# Patient Record
Sex: FEMALE | Race: WHITE | NOT HISPANIC OR LATINO | Employment: OTHER | URBAN - METROPOLITAN AREA
[De-identification: names, ages, dates, MRNs, and addresses within clinical notes are randomized per-mention and may not be internally consistent; named-entity substitution may affect disease eponyms.]

---

## 2024-02-25 ENCOUNTER — APPOINTMENT (OUTPATIENT)
Dept: RADIOLOGY | Facility: MEDICAL CENTER | Age: 89
End: 2024-02-25
Attending: EMERGENCY MEDICINE
Payer: MEDICARE

## 2024-02-25 ENCOUNTER — HOSPITAL ENCOUNTER (OUTPATIENT)
Facility: MEDICAL CENTER | Age: 89
End: 2024-02-27
Attending: EMERGENCY MEDICINE | Admitting: HOSPITALIST
Payer: MEDICARE

## 2024-02-25 DIAGNOSIS — E78.5 DYSLIPIDEMIA: ICD-10-CM

## 2024-02-25 DIAGNOSIS — D75.839 THROMBOCYTOSIS: ICD-10-CM

## 2024-02-25 DIAGNOSIS — E87.1 HYPONATREMIA: ICD-10-CM

## 2024-02-25 DIAGNOSIS — Z66 DNR (DO NOT RESUSCITATE): ICD-10-CM

## 2024-02-25 DIAGNOSIS — S32.020A CLOSED COMPRESSION FRACTURE OF L2 LUMBAR VERTEBRA, INITIAL ENCOUNTER (HCC): ICD-10-CM

## 2024-02-25 DIAGNOSIS — I10 HYPERTENSION, UNSPECIFIED TYPE: ICD-10-CM

## 2024-02-25 DIAGNOSIS — D72.829 LEUKOCYTOSIS, UNSPECIFIED TYPE: ICD-10-CM

## 2024-02-25 DIAGNOSIS — R74.8 ELEVATED ALKALINE PHOSPHATASE LEVEL: ICD-10-CM

## 2024-02-25 DIAGNOSIS — E11.9 CONTROLLED TYPE 2 DIABETES MELLITUS WITHOUT COMPLICATION, WITHOUT LONG-TERM CURRENT USE OF INSULIN (HCC): ICD-10-CM

## 2024-02-25 LAB
ALBUMIN SERPL BCP-MCNC: 4.1 G/DL (ref 3.2–4.9)
ALBUMIN/GLOB SERPL: 1.6 G/DL
ALP SERPL-CCNC: 143 U/L (ref 30–99)
ALT SERPL-CCNC: 8 U/L (ref 2–50)
ANION GAP SERPL CALC-SCNC: 16 MMOL/L (ref 7–16)
AST SERPL-CCNC: 13 U/L (ref 12–45)
BASOPHILS # BLD AUTO: 0.4 % (ref 0–1.8)
BASOPHILS # BLD: 0.06 K/UL (ref 0–0.12)
BILIRUB SERPL-MCNC: 0.7 MG/DL (ref 0.1–1.5)
BUN SERPL-MCNC: 19 MG/DL (ref 8–22)
CALCIUM ALBUM COR SERPL-MCNC: 8.6 MG/DL (ref 8.5–10.5)
CALCIUM SERPL-MCNC: 8.7 MG/DL (ref 8.4–10.2)
CHLORIDE SERPL-SCNC: 96 MMOL/L (ref 96–112)
CO2 SERPL-SCNC: 21 MMOL/L (ref 20–33)
CREAT SERPL-MCNC: 0.37 MG/DL (ref 0.5–1.4)
EOSINOPHIL # BLD AUTO: 0.07 K/UL (ref 0–0.51)
EOSINOPHIL NFR BLD: 0.4 % (ref 0–6.9)
ERYTHROCYTE [DISTWIDTH] IN BLOOD BY AUTOMATED COUNT: 43.5 FL (ref 35.9–50)
GFR SERPLBLD CREATININE-BSD FMLA CKD-EPI: 96 ML/MIN/1.73 M 2
GLOBULIN SER CALC-MCNC: 2.6 G/DL (ref 1.9–3.5)
GLUCOSE BLD STRIP.AUTO-MCNC: 104 MG/DL (ref 65–99)
GLUCOSE SERPL-MCNC: 108 MG/DL (ref 65–99)
HCT VFR BLD AUTO: 41.3 % (ref 37–47)
HGB BLD-MCNC: 14.3 G/DL (ref 12–16)
IMM GRANULOCYTES # BLD AUTO: 0.11 K/UL (ref 0–0.11)
IMM GRANULOCYTES NFR BLD AUTO: 0.7 % (ref 0–0.9)
INR PPP: 1.05 (ref 0.87–1.13)
LACTATE SERPL-SCNC: 1.2 MMOL/L (ref 0.5–2)
LYMPHOCYTES # BLD AUTO: 2.27 K/UL (ref 1–4.8)
LYMPHOCYTES NFR BLD: 13.7 % (ref 22–41)
MCH RBC QN AUTO: 32.3 PG (ref 27–33)
MCHC RBC AUTO-ENTMCNC: 34.6 G/DL (ref 32.2–35.5)
MCV RBC AUTO: 93.2 FL (ref 81.4–97.8)
MONOCYTES # BLD AUTO: 1.72 K/UL (ref 0–0.85)
MONOCYTES NFR BLD AUTO: 10.4 % (ref 0–13.4)
NEUTROPHILS # BLD AUTO: 12.34 K/UL (ref 1.82–7.42)
NEUTROPHILS NFR BLD: 74.4 % (ref 44–72)
NRBC # BLD AUTO: 0 K/UL
NRBC BLD-RTO: 0 /100 WBC (ref 0–0.2)
PLATELET # BLD AUTO: 481 K/UL (ref 164–446)
PMV BLD AUTO: 8 FL (ref 9–12.9)
POTASSIUM SERPL-SCNC: 3.9 MMOL/L (ref 3.6–5.5)
PROCALCITONIN SERPL-MCNC: 0.04 NG/ML
PROT SERPL-MCNC: 6.7 G/DL (ref 6–8.2)
PROTHROMBIN TIME: 14.1 SEC (ref 12–14.6)
RBC # BLD AUTO: 4.43 M/UL (ref 4.2–5.4)
SODIUM SERPL-SCNC: 133 MMOL/L (ref 135–145)
WBC # BLD AUTO: 16.6 K/UL (ref 4.8–10.8)

## 2024-02-25 PROCEDURE — 83605 ASSAY OF LACTIC ACID: CPT

## 2024-02-25 PROCEDURE — G0378 HOSPITAL OBSERVATION PER HR: HCPCS

## 2024-02-25 PROCEDURE — 96376 TX/PRO/DX INJ SAME DRUG ADON: CPT

## 2024-02-25 PROCEDURE — 82962 GLUCOSE BLOOD TEST: CPT

## 2024-02-25 PROCEDURE — 700111 HCHG RX REV CODE 636 W/ 250 OVERRIDE (IP): Mod: JZ | Performed by: HOSPITALIST

## 2024-02-25 PROCEDURE — 72192 CT PELVIS W/O DYE: CPT

## 2024-02-25 PROCEDURE — 99223 1ST HOSP IP/OBS HIGH 75: CPT | Performed by: HOSPITALIST

## 2024-02-25 PROCEDURE — 96375 TX/PRO/DX INJ NEW DRUG ADDON: CPT

## 2024-02-25 PROCEDURE — 700105 HCHG RX REV CODE 258: Performed by: EMERGENCY MEDICINE

## 2024-02-25 PROCEDURE — 84145 PROCALCITONIN (PCT): CPT

## 2024-02-25 PROCEDURE — 85610 PROTHROMBIN TIME: CPT

## 2024-02-25 PROCEDURE — 36415 COLL VENOUS BLD VENIPUNCTURE: CPT

## 2024-02-25 PROCEDURE — 80053 COMPREHEN METABOLIC PANEL: CPT

## 2024-02-25 PROCEDURE — 85025 COMPLETE CBC W/AUTO DIFF WBC: CPT

## 2024-02-25 PROCEDURE — 71045 X-RAY EXAM CHEST 1 VIEW: CPT

## 2024-02-25 PROCEDURE — 94760 N-INVAS EAR/PLS OXIMETRY 1: CPT

## 2024-02-25 PROCEDURE — 99285 EMERGENCY DEPT VISIT HI MDM: CPT

## 2024-02-25 PROCEDURE — 700111 HCHG RX REV CODE 636 W/ 250 OVERRIDE (IP): Mod: JZ | Performed by: EMERGENCY MEDICINE

## 2024-02-25 PROCEDURE — 700102 HCHG RX REV CODE 250 W/ 637 OVERRIDE(OP): Performed by: HOSPITALIST

## 2024-02-25 PROCEDURE — A9270 NON-COVERED ITEM OR SERVICE: HCPCS | Performed by: HOSPITALIST

## 2024-02-25 PROCEDURE — 700105 HCHG RX REV CODE 258: Performed by: HOSPITALIST

## 2024-02-25 PROCEDURE — 96365 THER/PROPH/DIAG IV INF INIT: CPT

## 2024-02-25 PROCEDURE — 72131 CT LUMBAR SPINE W/O DYE: CPT

## 2024-02-25 PROCEDURE — 82977 ASSAY OF GGT: CPT

## 2024-02-25 RX ORDER — ROSUVASTATIN CALCIUM 10 MG/1
10 TABLET, COATED ORAL DAILY
Status: ON HOLD | COMMUNITY
End: 2024-03-09

## 2024-02-25 RX ORDER — ASPIRIN 81 MG/1
81 TABLET ORAL DAILY
Status: ON HOLD | COMMUNITY
End: 2024-03-09

## 2024-02-25 RX ORDER — ASPIRIN 81 MG/1
81 TABLET ORAL DAILY
Status: DISCONTINUED | OUTPATIENT
Start: 2024-02-26 | End: 2024-02-26

## 2024-02-25 RX ORDER — ONDANSETRON 2 MG/ML
4 INJECTION INTRAMUSCULAR; INTRAVENOUS ONCE
Status: COMPLETED | OUTPATIENT
Start: 2024-02-25 | End: 2024-02-25

## 2024-02-25 RX ORDER — LABETALOL HYDROCHLORIDE 5 MG/ML
10 INJECTION, SOLUTION INTRAVENOUS EVERY 4 HOURS PRN
Status: DISCONTINUED | OUTPATIENT
Start: 2024-02-25 | End: 2024-02-27

## 2024-02-25 RX ORDER — OXYCODONE HYDROCHLORIDE 5 MG/1
2.5 TABLET ORAL
Status: DISCONTINUED | OUTPATIENT
Start: 2024-02-25 | End: 2024-02-27

## 2024-02-25 RX ORDER — MORPHINE SULFATE 4 MG/ML
2 INJECTION INTRAVENOUS
Status: DISCONTINUED | OUTPATIENT
Start: 2024-02-25 | End: 2024-02-26

## 2024-02-25 RX ORDER — AMLODIPINE BESYLATE 5 MG/1
5 TABLET ORAL DAILY
Status: DISCONTINUED | OUTPATIENT
Start: 2024-02-26 | End: 2024-02-27

## 2024-02-25 RX ORDER — VITAMIN B COMPLEX
1000 TABLET ORAL DAILY
Status: DISCONTINUED | OUTPATIENT
Start: 2024-02-26 | End: 2024-02-27

## 2024-02-25 RX ORDER — SODIUM CHLORIDE 9 MG/ML
1000 INJECTION, SOLUTION INTRAVENOUS ONCE
Status: COMPLETED | OUTPATIENT
Start: 2024-02-25 | End: 2024-02-25

## 2024-02-25 RX ORDER — METHOCARBAMOL 500 MG/1
500 TABLET, FILM COATED ORAL 4 TIMES DAILY
Status: DISCONTINUED | OUTPATIENT
Start: 2024-02-25 | End: 2024-02-27

## 2024-02-25 RX ORDER — OXYCODONE HYDROCHLORIDE 5 MG/1
5 TABLET ORAL
Status: DISCONTINUED | OUTPATIENT
Start: 2024-02-25 | End: 2024-02-27

## 2024-02-25 RX ORDER — RISEDRONATE SODIUM 35 MG/1
35 TABLET, FILM COATED ORAL
Status: ON HOLD | COMMUNITY
End: 2024-03-09

## 2024-02-25 RX ORDER — AMLODIPINE BESYLATE 5 MG/1
5 TABLET ORAL DAILY
Status: ON HOLD | COMMUNITY
End: 2024-03-09

## 2024-02-25 RX ORDER — BUTYROSPERMUM PARKII(SHEA BUTTER), SIMMONDSIA CHINENSIS (JOJOBA) SEED OIL, ALOE BARBADENSIS LEAF EXTRACT .01; 1; 3.5 G/100G; G/100G; G/100G
5000 LIQUID TOPICAL DAILY
COMMUNITY

## 2024-02-25 RX ORDER — MORPHINE SULFATE 4 MG/ML
4 INJECTION INTRAVENOUS ONCE
Status: COMPLETED | OUTPATIENT
Start: 2024-02-25 | End: 2024-02-25

## 2024-02-25 RX ORDER — ROSUVASTATIN CALCIUM 10 MG/1
10 TABLET, COATED ORAL DAILY
Status: DISCONTINUED | OUTPATIENT
Start: 2024-02-26 | End: 2024-02-27

## 2024-02-25 RX ORDER — GABAPENTIN 100 MG/1
100 CAPSULE ORAL 3 TIMES DAILY
Status: DISCONTINUED | OUTPATIENT
Start: 2024-02-25 | End: 2024-02-27

## 2024-02-25 RX ORDER — ONDANSETRON 4 MG/1
4 TABLET, ORALLY DISINTEGRATING ORAL EVERY 4 HOURS PRN
Status: DISCONTINUED | OUTPATIENT
Start: 2024-02-25 | End: 2024-02-27

## 2024-02-25 RX ORDER — ONDANSETRON 2 MG/ML
4 INJECTION INTRAMUSCULAR; INTRAVENOUS EVERY 4 HOURS PRN
Status: DISCONTINUED | OUTPATIENT
Start: 2024-02-25 | End: 2024-02-27

## 2024-02-25 RX ORDER — ACETAMINOPHEN 325 MG/1
650 TABLET ORAL EVERY 6 HOURS PRN
Status: DISCONTINUED | OUTPATIENT
Start: 2024-02-25 | End: 2024-02-27

## 2024-02-25 RX ORDER — DEXTROSE MONOHYDRATE 25 G/50ML
25 INJECTION, SOLUTION INTRAVENOUS
Status: DISCONTINUED | OUTPATIENT
Start: 2024-02-25 | End: 2024-02-27

## 2024-02-25 RX ORDER — MORPHINE SULFATE 4 MG/ML
2 INJECTION INTRAVENOUS ONCE
Status: COMPLETED | OUTPATIENT
Start: 2024-02-25 | End: 2024-02-25

## 2024-02-25 RX ORDER — IRBESARTAN 300 MG/1
300 TABLET ORAL DAILY
Status: ON HOLD | COMMUNITY
End: 2024-03-09

## 2024-02-25 RX ORDER — IRBESARTAN 150 MG/1
300 TABLET ORAL DAILY
Status: DISCONTINUED | OUTPATIENT
Start: 2024-02-26 | End: 2024-02-27

## 2024-02-25 RX ADMIN — SODIUM CHLORIDE 1000 ML: 9 INJECTION, SOLUTION INTRAVENOUS at 16:38

## 2024-02-25 RX ADMIN — OXYCODONE HYDROCHLORIDE 5 MG: 5 TABLET ORAL at 20:52

## 2024-02-25 RX ADMIN — MORPHINE SULFATE 2 MG: 4 INJECTION, SOLUTION INTRAMUSCULAR; INTRAVENOUS at 18:11

## 2024-02-25 RX ADMIN — ONDANSETRON 4 MG: 2 INJECTION INTRAMUSCULAR; INTRAVENOUS at 16:38

## 2024-02-25 RX ADMIN — METHOCARBAMOL 500 MG: 500 TABLET ORAL at 21:32

## 2024-02-25 RX ADMIN — CEFTRIAXONE SODIUM 1000 MG: 1 INJECTION, POWDER, FOR SOLUTION INTRAMUSCULAR; INTRAVENOUS at 18:45

## 2024-02-25 RX ADMIN — MORPHINE SULFATE 4 MG: 4 INJECTION, SOLUTION INTRAMUSCULAR; INTRAVENOUS at 16:38

## 2024-02-25 ASSESSMENT — ENCOUNTER SYMPTOMS
FEVER: 0
SHORTNESS OF BREATH: 0
COUGH: 0
FOCAL WEAKNESS: 0
SINUS PAIN: 0
POLYDIPSIA: 0
STRIDOR: 0
CHILLS: 0
SEIZURES: 0
INSOMNIA: 0
BLOOD IN STOOL: 0
EYES NEGATIVE: 1
BACK PAIN: 1
GASTROINTESTINAL NEGATIVE: 1
NEUROLOGICAL NEGATIVE: 1
SPUTUM PRODUCTION: 0
CARDIOVASCULAR NEGATIVE: 1
EYE DISCHARGE: 0
WHEEZING: 0
PHOTOPHOBIA: 0
DIAPHORESIS: 0
DEPRESSION: 0
EYE REDNESS: 0
HEARTBURN: 0
PSYCHIATRIC NEGATIVE: 1
PND: 0
FALLS: 1
ABDOMINAL PAIN: 0
RESPIRATORY NEGATIVE: 1
TINGLING: 0
BRUISES/BLEEDS EASILY: 0
DOUBLE VISION: 0
SENSORY CHANGE: 0
ORTHOPNEA: 0
DIZZINESS: 0
FLANK PAIN: 0
MYALGIAS: 0

## 2024-02-25 ASSESSMENT — COGNITIVE AND FUNCTIONAL STATUS - GENERAL
SUGGESTED CMS G CODE MODIFIER MOBILITY: CN
CLIMB 3 TO 5 STEPS WITH RAILING: TOTAL
DRESSING REGULAR UPPER BODY CLOTHING: TOTAL
WALKING IN HOSPITAL ROOM: TOTAL
SUGGESTED CMS G CODE MODIFIER DAILY ACTIVITY: CL
TOILETING: TOTAL
DRESSING REGULAR LOWER BODY CLOTHING: TOTAL
EATING MEALS: A LITTLE
PERSONAL GROOMING: A LOT
TURNING FROM BACK TO SIDE WHILE IN FLAT BAD: UNABLE
HELP NEEDED FOR BATHING: TOTAL
STANDING UP FROM CHAIR USING ARMS: TOTAL
DAILY ACTIVITIY SCORE: 9
MOVING FROM LYING ON BACK TO SITTING ON SIDE OF FLAT BED: UNABLE
MOVING TO AND FROM BED TO CHAIR: UNABLE
MOBILITY SCORE: 6

## 2024-02-25 ASSESSMENT — PATIENT HEALTH QUESTIONNAIRE - PHQ9
1. LITTLE INTEREST OR PLEASURE IN DOING THINGS: NOT AT ALL
SUM OF ALL RESPONSES TO PHQ9 QUESTIONS 1 AND 2: 0
2. FEELING DOWN, DEPRESSED, IRRITABLE, OR HOPELESS: NOT AT ALL

## 2024-02-25 ASSESSMENT — LIFESTYLE VARIABLES
HOW MANY TIMES IN THE PAST YEAR HAVE YOU HAD 5 OR MORE DRINKS IN A DAY: 0
ON A TYPICAL DAY WHEN YOU DRINK ALCOHOL HOW MANY DRINKS DO YOU HAVE: 0
EVER HAD A DRINK FIRST THING IN THE MORNING TO STEADY YOUR NERVES TO GET RID OF A HANGOVER: NO
EVER FELT BAD OR GUILTY ABOUT YOUR DRINKING: NO
HAVE PEOPLE ANNOYED YOU BY CRITICIZING YOUR DRINKING: NO
ALCOHOL_USE: NO
TOTAL SCORE: 0
SUBSTANCE_ABUSE: 0
AVERAGE NUMBER OF DAYS PER WEEK YOU HAVE A DRINK CONTAINING ALCOHOL: 0
CONSUMPTION TOTAL: NEGATIVE
HAVE YOU EVER FELT YOU SHOULD CUT DOWN ON YOUR DRINKING: NO

## 2024-02-25 ASSESSMENT — PAIN DESCRIPTION - PAIN TYPE: TYPE: ACUTE PAIN

## 2024-02-25 ASSESSMENT — VISUAL ACUITY: OU: 1

## 2024-02-25 ASSESSMENT — FIBROSIS 4 INDEX: FIB4 SCORE: 0.85

## 2024-02-26 ENCOUNTER — HOSPITAL ENCOUNTER (INPATIENT)
Facility: REHABILITATION | Age: 89
End: 2024-02-26
Attending: INTERNAL MEDICINE | Admitting: INTERNAL MEDICINE
Payer: MEDICARE

## 2024-02-26 LAB
ANION GAP SERPL CALC-SCNC: 14 MMOL/L (ref 7–16)
APPEARANCE UR: CLEAR
BACTERIA #/AREA URNS HPF: ABNORMAL /HPF
BILIRUB UR QL STRIP.AUTO: ABNORMAL
BUN SERPL-MCNC: 17 MG/DL (ref 8–22)
CALCIUM SERPL-MCNC: 8.1 MG/DL (ref 8.4–10.2)
CHLORIDE SERPL-SCNC: 101 MMOL/L (ref 96–112)
CO2 SERPL-SCNC: 21 MMOL/L (ref 20–33)
COLOR UR: YELLOW
CREAT SERPL-MCNC: 0.3 MG/DL (ref 0.5–1.4)
EPI CELLS #/AREA URNS HPF: ABNORMAL /HPF
ERYTHROCYTE [DISTWIDTH] IN BLOOD BY AUTOMATED COUNT: 43.8 FL (ref 35.9–50)
GFR SERPLBLD CREATININE-BSD FMLA CKD-EPI: 101 ML/MIN/1.73 M 2
GGT SERPL-CCNC: 19 U/L (ref 7–34)
GLUCOSE BLD STRIP.AUTO-MCNC: 164 MG/DL (ref 65–99)
GLUCOSE BLD STRIP.AUTO-MCNC: 218 MG/DL (ref 65–99)
GLUCOSE BLD STRIP.AUTO-MCNC: 239 MG/DL (ref 65–99)
GLUCOSE BLD STRIP.AUTO-MCNC: 84 MG/DL (ref 65–99)
GLUCOSE SERPL-MCNC: 87 MG/DL (ref 65–99)
GLUCOSE UR STRIP.AUTO-MCNC: NEGATIVE MG/DL
HCT VFR BLD AUTO: 37 % (ref 37–47)
HGB BLD-MCNC: 12.7 G/DL (ref 12–16)
KETONES UR STRIP.AUTO-MCNC: >=80 MG/DL
LEUKOCYTE ESTERASE UR QL STRIP.AUTO: ABNORMAL
MCH RBC QN AUTO: 32.2 PG (ref 27–33)
MCHC RBC AUTO-ENTMCNC: 34.3 G/DL (ref 32.2–35.5)
MCV RBC AUTO: 93.9 FL (ref 81.4–97.8)
MICRO URNS: ABNORMAL
MUCOUS THREADS #/AREA URNS HPF: ABNORMAL /HPF
NITRITE UR QL STRIP.AUTO: POSITIVE
PH UR STRIP.AUTO: 5.5 [PH] (ref 5–8)
PLATELET # BLD AUTO: 395 K/UL (ref 164–446)
PMV BLD AUTO: 7.7 FL (ref 9–12.9)
POTASSIUM SERPL-SCNC: 3.5 MMOL/L (ref 3.6–5.5)
PROT UR QL STRIP: NEGATIVE MG/DL
RBC # BLD AUTO: 3.94 M/UL (ref 4.2–5.4)
RBC # URNS HPF: ABNORMAL /HPF
RBC UR QL AUTO: NEGATIVE
SODIUM SERPL-SCNC: 136 MMOL/L (ref 135–145)
SP GR UR STRIP.AUTO: >=1.03
WBC # BLD AUTO: 15.1 K/UL (ref 4.8–10.8)
WBC #/AREA URNS HPF: ABNORMAL /HPF

## 2024-02-26 PROCEDURE — 99232 SBSQ HOSP IP/OBS MODERATE 35: CPT | Performed by: INTERNAL MEDICINE

## 2024-02-26 PROCEDURE — 94760 N-INVAS EAR/PLS OXIMETRY 1: CPT

## 2024-02-26 PROCEDURE — 700102 HCHG RX REV CODE 250 W/ 637 OVERRIDE(OP): Performed by: HOSPITALIST

## 2024-02-26 PROCEDURE — G0378 HOSPITAL OBSERVATION PER HR: HCPCS

## 2024-02-26 PROCEDURE — 85027 COMPLETE CBC AUTOMATED: CPT

## 2024-02-26 PROCEDURE — 82962 GLUCOSE BLOOD TEST: CPT | Mod: 91

## 2024-02-26 PROCEDURE — A9270 NON-COVERED ITEM OR SERVICE: HCPCS | Performed by: HOSPITALIST

## 2024-02-26 PROCEDURE — 97162 PT EVAL MOD COMPLEX 30 MIN: CPT

## 2024-02-26 PROCEDURE — 87040 BLOOD CULTURE FOR BACTERIA: CPT | Mod: 91

## 2024-02-26 PROCEDURE — 700101 HCHG RX REV CODE 250: Performed by: INTERNAL MEDICINE

## 2024-02-26 PROCEDURE — 700102 HCHG RX REV CODE 250 W/ 637 OVERRIDE(OP): Performed by: INTERNAL MEDICINE

## 2024-02-26 PROCEDURE — A9270 NON-COVERED ITEM OR SERVICE: HCPCS | Performed by: INTERNAL MEDICINE

## 2024-02-26 PROCEDURE — 81001 URINALYSIS AUTO W/SCOPE: CPT

## 2024-02-26 PROCEDURE — 97166 OT EVAL MOD COMPLEX 45 MIN: CPT

## 2024-02-26 PROCEDURE — 80048 BASIC METABOLIC PNL TOTAL CA: CPT

## 2024-02-26 PROCEDURE — 36415 COLL VENOUS BLD VENIPUNCTURE: CPT

## 2024-02-26 PROCEDURE — 96372 THER/PROPH/DIAG INJ SC/IM: CPT

## 2024-02-26 PROCEDURE — 97535 SELF CARE MNGMENT TRAINING: CPT

## 2024-02-26 RX ORDER — LIDOCAINE 50 MG/G
1 PATCH TOPICAL EVERY 24 HOURS
Status: DISCONTINUED | OUTPATIENT
Start: 2024-02-26 | End: 2024-02-27

## 2024-02-26 RX ORDER — POLYETHYLENE GLYCOL 3350 17 G/17G
1 POWDER, FOR SOLUTION ORAL 2 TIMES DAILY
Status: DISCONTINUED | OUTPATIENT
Start: 2024-02-26 | End: 2024-02-27

## 2024-02-26 RX ADMIN — INSULIN HUMAN 1 UNITS: 100 INJECTION, SOLUTION PARENTERAL at 20:55

## 2024-02-26 RX ADMIN — METHOCARBAMOL 500 MG: 500 TABLET ORAL at 08:04

## 2024-02-26 RX ADMIN — INSULIN HUMAN 2 UNITS: 100 INJECTION, SOLUTION PARENTERAL at 16:43

## 2024-02-26 RX ADMIN — LIDOCAINE 1 PATCH: 700 PATCH TOPICAL at 09:13

## 2024-02-26 RX ADMIN — Medication 1000 UNITS: at 06:41

## 2024-02-26 RX ADMIN — METFORMIN HYDROCHLORIDE 500 MG: 500 TABLET ORAL at 06:41

## 2024-02-26 RX ADMIN — ASPIRIN 81 MG: 81 TABLET, COATED ORAL at 06:41

## 2024-02-26 RX ADMIN — OXYCODONE HYDROCHLORIDE 5 MG: 5 TABLET ORAL at 23:51

## 2024-02-26 RX ADMIN — INSULIN HUMAN 2 UNITS: 100 INJECTION, SOLUTION PARENTERAL at 11:57

## 2024-02-26 RX ADMIN — AMLODIPINE BESYLATE 5 MG: 5 TABLET ORAL at 06:41

## 2024-02-26 RX ADMIN — GABAPENTIN 100 MG: 100 CAPSULE ORAL at 06:40

## 2024-02-26 RX ADMIN — ROSUVASTATIN 10 MG: 10 TABLET, FILM COATED ORAL at 06:41

## 2024-02-26 RX ADMIN — GABAPENTIN 100 MG: 100 CAPSULE ORAL at 12:01

## 2024-02-26 RX ADMIN — IRBESARTAN 300 MG: 150 TABLET ORAL at 06:42

## 2024-02-26 RX ADMIN — METHOCARBAMOL 500 MG: 500 TABLET ORAL at 16:45

## 2024-02-26 RX ADMIN — METHOCARBAMOL 500 MG: 500 TABLET ORAL at 20:48

## 2024-02-26 RX ADMIN — OXYCODONE HYDROCHLORIDE 5 MG: 5 TABLET ORAL at 12:00

## 2024-02-26 RX ADMIN — POLYETHYLENE GLYCOL 3350 1 PACKET: 17 POWDER, FOR SOLUTION ORAL at 10:00

## 2024-02-26 RX ADMIN — GABAPENTIN 100 MG: 100 CAPSULE ORAL at 16:45

## 2024-02-26 ASSESSMENT — COGNITIVE AND FUNCTIONAL STATUS - GENERAL
DRESSING REGULAR LOWER BODY CLOTHING: TOTAL
MOVING TO AND FROM BED TO CHAIR: UNABLE
SUGGESTED CMS G CODE MODIFIER MOBILITY: CL
EATING MEALS: A LITTLE
MOVING FROM LYING ON BACK TO SITTING ON SIDE OF FLAT BED: UNABLE
TURNING FROM BACK TO SIDE WHILE IN FLAT BAD: A LOT
HELP NEEDED FOR BATHING: TOTAL
PERSONAL GROOMING: A LOT
WALKING IN HOSPITAL ROOM: A LOT
STANDING UP FROM CHAIR USING ARMS: A LITTLE
CLIMB 3 TO 5 STEPS WITH RAILING: TOTAL
DRESSING REGULAR UPPER BODY CLOTHING: A LOT
MOBILITY SCORE: 10
TOILETING: TOTAL
SUGGESTED CMS G CODE MODIFIER DAILY ACTIVITY: CL
DAILY ACTIVITIY SCORE: 10

## 2024-02-26 ASSESSMENT — PAIN DESCRIPTION - PAIN TYPE
TYPE: ACUTE PAIN

## 2024-02-26 ASSESSMENT — FIBROSIS 4 INDEX
FIB4 SCORE: 1.04
FIB4 SCORE: 1.04

## 2024-02-26 ASSESSMENT — GAIT ASSESSMENTS: GAIT LEVEL OF ASSIST: UNABLE TO PARTICIPATE

## 2024-02-26 ASSESSMENT — ENCOUNTER SYMPTOMS
NAUSEA: 0
BACK PAIN: 1
SHORTNESS OF BREATH: 0
WEAKNESS: 1

## 2024-02-26 ASSESSMENT — ACTIVITIES OF DAILY LIVING (ADL): TOILETING: INDEPENDENT

## 2024-02-26 NOTE — ASSESSMENT & PLAN NOTE
Patient fell at assisted living on Thursday  She just moved into assisted living 5 days ago from [facility].  The patient's fall resulted initially in minimal pain but over time the patient's pain became excruciating.  Patient is now found to have a L2 compression fracture which is acute.  Initiate pain management, muscle relaxant, Lidoderm patch ,PT OT -Will likely need to go to SNF as she has been falling.  Can get kyphoplasty outpatient at [facility]-aspirin has been held but per radiology there is no timeframe that would prevent her from having the procedure as soon as it could be scheduled.    2/27: I discussed with Dr Castillo from IR who recommends kyphoplasty, unfortunately it cannot be done at [facility] and the patient will be transferred to [facility].

## 2024-02-26 NOTE — ED PROVIDER NOTES
CHIEF COMPLAINT  Chief Complaint   Patient presents with    Fall    Back Pain       LIMITATION TO HISTORY   None    HPI    Mariana Jett is a 89 y.o. female who has a history of kyphoplasty due to fracture in the past  Has frequent falls where she falls once every few weeks resulting wrist fractures, rib fractures, kyphoplasty and fractures comes in today with  Unable to get out of bed  Mela to get a bed is started today.  Is associate with low back pain.  The pain tried to make it worse.  Movement makes it better.  It is not associate numbness or tingling° to her hips bilaterally    She apparently fell while she was trying to hang her close.  She fell on the ground she not sure she fell on her back or hit her head.  However she was able to ambulate yesterday but then today she was unable to get out of bed she is brought coming by her daughter who states she will visit her today and will try to get her mobilized to go moving eat but she is unable to move.  She has not been eating for the last day or so she not been drinking.  She did urinate in her underwear    OUTSIDE HISTORIAN(S):  See above    EXTERNAL RECORDS REVIEWED  None    REVIEW OF SYSTEMS  None    PAST MEDICAL HISTORY  Past Medical History:   Diagnosis Date    DMII (diabetes mellitus, type 2) (HCC)     High cholesterol     HTN (hypertension)        FAMILY HISTORY  History reviewed. No pertinent family history.    SOCIAL HISTORY  Social History     Tobacco Use    Smoking status: Never    Smokeless tobacco: Never   Vaping Use    Vaping Use: Never used   Substance Use Topics    Alcohol use: Not Currently    Drug use: Never     Social History     Substance and Sexual Activity   Drug Use Never       SURGICAL HISTORY  History reviewed. No pertinent surgical history.    CURRENT MEDICATIONS  No current facility-administered medications for this encounter.    Current Outpatient Medications:     irbesartan (AVAPRO) 300 MG Tab, Take 300 mg by mouth every day., Disp: ,  Rfl:     risedronate (ACTONEL) 35 MG Tab, Take 35 mg by mouth every 7 days. On Monday, Disp: , Rfl:     Cranberry 1000 MG Cap, Take 1,000 mg by mouth every day., Disp: , Rfl:     aspirin 81 MG EC tablet, Take 81 mg by mouth every day., Disp: , Rfl:     cholecalciferol (D3 5000) 5000 UNIT Cap, Take 5,000 Units by mouth every day., Disp: , Rfl:     Acetaminophen 500 MG Cap, Take 500-1,000 mg by mouth 2 times a day as needed. Indications: Pain, Disp: , Rfl:     metFORMIN (GLUCOPHAGE) 500 MG Tab, Take 500 mg by mouth every day., Disp: , Rfl:     rosuvastatin (CRESTOR) 10 MG Tab, Take 10 mg by mouth every day., Disp: , Rfl:     amLODIPine (NORVASC) 5 MG Tab, Take 5 mg by mouth every day., Disp: , Rfl:     ALLERGIES  No Known Allergies    PHYSICAL EXAM  VITAL SIGNS: BP (!) 188/78   Pulse 90   Temp 36.4 °C (97.6 °F) (Temporal)   Resp 18   Wt 47.6 kg (105 lb)   SpO2 91%   Reviewed and noted  Constitutional: Early no acute distress  HENT: Normocephalic, atraumatic, bilateral external ears normal, No intraoral erythema, edema, exudate  Eyes: PERRLA, conjunctiva pink, no scleral icterus.   Cardiovascular: Regular rate and rhythm. No murmurs, rubs or gallops.  No dependent edema or calf tenderness  Respiratory: Lungs clear to auscultation bilaterally. No wheezes, rales, or rhonchi.  Abdominal:  Abdomen soft, non-tender, non distended. No rebound, or guarding.    Skin: No erythema, no rash. No wounds or bruising.  Genitourinary: No costovertebral angle tenderness.   Musculoskeletal: There is no C-spine tenderness no T-spine tenderness has about L1-L5 tenderness.  The pelvis is also stable she has no chest wall tenderness to palpation.  When I range both hips knees ankles and patient has some tenderness both referred to low back area.  There is no deformity noted palpating the hips and the knees.  Neurologic: Alert, no facial droop noted. All extra ocular muscles intact. Moves all extremities with out weakness  noted  Psychiatric: Affect normal, Judgment normal, Mood normal.         MEDICAL DECISION MAKING:  PROBLEMS EVALUATED THIS VISIT:  Back pain.  Patient is status post fall 2 days ago history of kyphoplasty history of compression fractures.  She is neurovascularly intact with no signs of spinal cord injury at this time.  Frequent falls.  Patient has falls once every 2 weeks.  Recent move.  She is from [place] to just moved here to assisted living.  She has not fallen more here.  She seems to be following her normal routine.         PLAN:  CT scan  Lab work      RISK:  Is at high risk she will need admission to the hospital for further workup        RESULTS    LABS Ordered and Reviewed by Me:  Results for orders placed or performed during the hospital encounter of 02/25/24   CBC WITH DIFFERENTIAL   Result Value Ref Range    WBC 16.6 (H) 4.8 - 10.8 K/uL    RBC 4.43 4.20 - 5.40 M/uL    Hemoglobin 14.3 12.0 - 16.0 g/dL    Hematocrit 41.3 37.0 - 47.0 %    MCV 93.2 81.4 - 97.8 fL    MCH 32.3 27.0 - 33.0 pg    MCHC 34.6 32.2 - 35.5 g/dL    RDW 43.5 35.9 - 50.0 fL    Platelet Count 481 (H) 164 - 446 K/uL    MPV 8.0 (L) 9.0 - 12.9 fL    Neutrophils-Polys 74.40 (H) 44.00 - 72.00 %    Lymphocytes 13.70 (L) 22.00 - 41.00 %    Monocytes 10.40 0.00 - 13.40 %    Eosinophils 0.40 0.00 - 6.90 %    Basophils 0.40 0.00 - 1.80 %    Immature Granulocytes 0.70 0.00 - 0.90 %    Nucleated RBC 0.00 0.00 - 0.20 /100 WBC    Neutrophils (Absolute) 12.34 (H) 1.82 - 7.42 K/uL    Lymphs (Absolute) 2.27 1.00 - 4.80 K/uL    Monos (Absolute) 1.72 (H) 0.00 - 0.85 K/uL    Eos (Absolute) 0.07 0.00 - 0.51 K/uL    Baso (Absolute) 0.06 0.00 - 0.12 K/uL    Immature Granulocytes (abs) 0.11 0.00 - 0.11 K/uL    NRBC (Absolute) 0.00 K/uL   CMP   Result Value Ref Range    Sodium 133 (L) 135 - 145 mmol/L    Potassium 3.9 3.6 - 5.5 mmol/L    Chloride 96 96 - 112 mmol/L    Co2 21 20 - 33 mmol/L    Anion Gap 16.0 7.0 - 16.0    Glucose 108 (H) 65 - 99 mg/dL    Bun  19 8 - 22 mg/dL    Creatinine 0.37 (L) 0.50 - 1.40 mg/dL    Calcium 8.7 8.4 - 10.2 mg/dL    Correct Calcium 8.6 8.5 - 10.5 mg/dL    AST(SGOT) 13 12 - 45 U/L    ALT(SGPT) 8 2 - 50 U/L    Alkaline Phosphatase 143 (H) 30 - 99 U/L    Total Bilirubin 0.7 0.1 - 1.5 mg/dL    Albumin 4.1 3.2 - 4.9 g/dL    Total Protein 6.7 6.0 - 8.2 g/dL    Globulin 2.6 1.9 - 3.5 g/dL    A-G Ratio 1.6 g/dL   PT/INR   Result Value Ref Range    PT 14.1 12.0 - 14.6 sec    INR 1.05 0.87 - 1.13   ESTIMATED GFR   Result Value Ref Range    GFR (CKD-EPI) 96 >60 mL/min/1.73 m 2         RADIOLOGY        Radiologist interpretation:   CT-PELVIS W/O PLUS RECONS   Final Result      No acute fracture detected.      CT-LSPINE W/O PLUS RECONS   Final Result      1.  Acute L2 superior endplate compression fracture.   2.  Additional findings as above.      DX-CHEST-PORTABLE (1 VIEW)    (Results Pending)         ED COURSE:    ED Observation Status? No   No noted need for observation for developing issue    INTERVENTIONS BY ME:  Medications   morphine 4 MG/ML injection 4 mg (4 mg Intravenous Given 2/25/24 1638)   ondansetron (Zofran) syringe/vial injection 4 mg (4 mg Intravenous Given 2/25/24 1638)   NS (Bolus) 0.9 % infusion 1,000 mL (1,000 mL Intravenous New Bag 2/25/24 1638)       Response on recheck:  Patient at this point still requiring pain medicine.  Patient get 2 mg of morphine..        FINAL DISPO PLAN   Admit to the hospital    89-year-old female who fell 2 days ago now cannot walk.  Required 2 rounds of pain medication still in pain.  Patient at this point will need PT OT further evaluation.  Uncertain if this patient is a candidate for kyphoplasty.    Patient at this point med for pain management.  In addition has a leukocytosis which is needed further workup chest x-ray urine pending    CONDITION: Guarded.     FINAL IMPRESSION  1. Closed compression fracture of L2 lumbar vertebra, initial encounter ([facility])    2. Leukocytosis, unspecified type

## 2024-02-26 NOTE — PROGRESS NOTES
0700 - Bedside report received from JATINDER Robertson. Alert and oriented X4, on RA in no acute respiratory distress. Daily plan of care discussed. Patient complains of moderate pain, medicated per MAR. No other needs at this time. Call light and personal belongings within reach. Hourly rounding in place. Chart reviewed for recent labs, notes, and orders.    1013 - UA collected. Patient was able to produce urine, had refused straight cath.

## 2024-02-26 NOTE — H&P
Hospital Medicine History & Physical Note    Date of Service  2/25/2024    Primary Care Physician  Pcp Pt States None    Consultants  None    Specialist Names: None    Code Status  DNAR/DNI    Chief Complaint  Chief Complaint   Patient presents with    Fall    Back Pain       History of Presenting Illness  Mariana Jett is a 89 y.o. female who presented 2/25/2024 with severe back pain that has progressively gotten worse since her fall on Thursday.  Patient recently moved from [facility] to [place] and went right into assisted living.  She has been in assisted living for the past 5 days.  On Thursday she suffered a fall and landed on her back and since then she has been progressively getting worse and worse on her pain.  As her pain has elevated she has become increasingly more sedentary and is unable to walk at this point.  Patient does came in with her daughter to the emergency room.  She was evaluated and found to have an L2 compression fracture.  Patient will need pain management, therapy evaluation and then set up for outpatient kyphoplasty.  She has had 2 kyphoplasty's previously.  The patient also was found to have a white blood cell count of 16,600 and she tells me that she has been having suprapubic tenderness, frequency of urination and also some dysuria.  Patient at this point will be evaluated urine analysis.  She will also be evaluated blood cultures x 2 and a chest x-ray and procalcitonin level.  In the meantime once we have collected cultures I will give her a dose of IV Rocephin which may need to be continued if she does indeed have a urinary tract infection..    I discussed the plan of care with patient, family, bedside RN, and emergency room physician Dr. Solano .    Review of Systems  Review of Systems   Constitutional:  Positive for malaise/fatigue. Negative for chills, diaphoresis and fever.   HENT: Negative.  Negative for nosebleeds and sinus pain.    Eyes: Negative.  Negative for double  vision, photophobia, discharge and redness.   Respiratory: Negative.  Negative for cough, sputum production, shortness of breath, wheezing and stridor.    Cardiovascular: Negative.  Negative for chest pain, orthopnea, leg swelling and PND.   Gastrointestinal: Negative.  Negative for abdominal pain, blood in stool and heartburn.   Genitourinary: Negative.  Negative for dysuria, flank pain and frequency.   Musculoskeletal:  Positive for back pain and falls (Patient is experiencing recurrent falls.  She fell 2 weeks ago and then again fell on Thursday of this week). Negative for myalgias.        Difficulty ambulating   Skin: Negative.  Negative for itching.   Neurological: Negative.  Negative for dizziness, tingling, sensory change, focal weakness and seizures.   Endo/Heme/Allergies: Negative.  Negative for polydipsia. Does not bruise/bleed easily.   Psychiatric/Behavioral: Negative.  Negative for depression, substance abuse and suicidal ideas. The patient does not have insomnia.    All other systems reviewed and are negative.      Past Medical History   has a past medical history of DMII (diabetes mellitus, type 2) (HCC), High cholesterol, and HTN (hypertension).    Surgical History   has no past surgical history on file.     Family History  There is cancer in the family on both sides  Family history reviewed with patient. There is no family history that is pertinent to the chief complaint.     Social History   reports that she has never smoked. She has never used smokeless tobacco. She reports that she does not currently use alcohol. She reports that she does not use drugs.    Allergies  No Known Allergies    Medications  Prior to Admission Medications   Prescriptions Last Dose Informant Patient Reported? Taking?   Acetaminophen 500 MG Cap 2/25/2024 at 1500 Patient Yes Yes   Sig: Take 500-1,000 mg by mouth 2 times a day as needed. Indications: Pain   Cranberry 1000 MG Cap 2/24/2024 at 0800 Patient Yes Yes   Sig: Take  1,000 mg by mouth every day.   amLODIPine (NORVASC) 5 MG Tab 2/25/2024 at 1500 Patient Yes Yes   Sig: Take 5 mg by mouth every day.   aspirin 81 MG EC tablet 2/25/2024 at 1500 Patient Yes Yes   Sig: Take 81 mg by mouth every day.   cholecalciferol (D3 5000) 5000 UNIT Cap 2/24/2024 at 0800 Patient Yes Yes   Sig: Take 5,000 Units by mouth every day.   irbesartan (AVAPRO) 300 MG Tab 2/25/2024 at 1500 Patient Yes Yes   Sig: Take 300 mg by mouth every day.   metFORMIN (GLUCOPHAGE) 500 MG Tab 2/25/2024 at 1500 Patient Yes Yes   Sig: Take 500 mg by mouth every day.   risedronate (ACTONEL) 35 MG Tab 2/12/2024 at AM Patient Yes Yes   Sig: Take 35 mg by mouth every 7 days. On Monday   rosuvastatin (CRESTOR) 10 MG Tab 2/25/2024 at 1500 Patient Yes Yes   Sig: Take 10 mg by mouth every day.      Facility-Administered Medications: None       Physical Exam  Temp:  [36.4 °C (97.6 °F)] 36.4 °C (97.6 °F)  Pulse:  [90-92] 90  Resp:  [18] 18  BP: (188)/(78) 188/78  SpO2:  [91 %-92 %] 91 %  Blood Pressure : (!) 188/78   Temperature: 36.4 °C (97.6 °F)   Pulse: 90   Respiration: 18   Pulse Oximetry: 91 %       Physical Exam  Vitals and nursing note reviewed. Exam conducted with a chaperone present.   Constitutional:       General: She is awake.      Appearance: Normal appearance. She is well-developed, well-groomed and normal weight. She is ill-appearing.   HENT:      Head: Normocephalic and atraumatic.      Jaw: There is normal jaw occlusion. No trismus.      Salivary Glands: Right salivary gland is not tender. Left salivary gland is not tender.      Right Ear: External ear normal.      Left Ear: External ear normal.      Nose: Nose normal.      Mouth/Throat:      Mouth: Mucous membranes are dry.      Pharynx: Oropharynx is clear.   Eyes:      General: Lids are normal. Vision grossly intact.         Right eye: No discharge.         Left eye: No discharge.      Extraocular Movements: Extraocular movements intact.      Conjunctiva/sclera:  Conjunctivae normal.      Right eye: Right conjunctiva is not injected. No exudate.     Left eye: Left conjunctiva is not injected. No exudate.     Pupils: Pupils are equal, round, and reactive to light.   Neck:      Thyroid: No thyroid mass.      Vascular: No hepatojugular reflux or JVD.      Trachea: No abnormal tracheal secretions or tracheal deviation.   Cardiovascular:      Rate and Rhythm: Normal rate and regular rhythm. Occasional Extrasystoles are present.     Pulses: Normal pulses.      Heart sounds: Murmur heard.      No friction rub.   Pulmonary:      Effort: Pulmonary effort is normal.      Breath sounds: Examination of the right-lower field reveals decreased breath sounds. Examination of the left-lower field reveals decreased breath sounds. Decreased breath sounds present. No wheezing or rhonchi.   Abdominal:      General: Abdomen is flat.      Palpations: Abdomen is soft.      Tenderness: There is abdominal tenderness in the suprapubic area. There is no right CVA tenderness or left CVA tenderness.      Hernia: No hernia is present.   Musculoskeletal:      Cervical back: Full passive range of motion without pain, normal range of motion and neck supple. No rigidity. No muscular tenderness.      Lumbar back: Tenderness and bony tenderness present. Decreased range of motion.      Right lower leg: No edema.      Left lower leg: No edema.      Comments: Inability to ambulate   Lymphadenopathy:      Head:      Right side of head: No submental adenopathy.      Left side of head: No submental adenopathy.      Cervical:      Right cervical: No superficial cervical adenopathy.     Left cervical: No superficial cervical adenopathy.      Upper Body:      Right upper body: No supraclavicular adenopathy.      Left upper body: No supraclavicular adenopathy.   Skin:     General: Skin is warm and dry.      Capillary Refill: Capillary refill takes less than 2 seconds.      Coloration: Skin is not cyanotic or pale.       "Findings: No abrasion or bruising.   Neurological:      General: No focal deficit present.      Mental Status: She is alert and oriented to person, place, and time. Mental status is at baseline.      GCS: GCS eye subscore is 4. GCS verbal subscore is 5. GCS motor subscore is 6.      Cranial Nerves: No cranial nerve deficit.      Sensory: No sensory deficit.      Motor: Motor function is intact.      Coordination: Impaired rapid alternating movements.      Gait: Gait abnormal and tandem walk abnormal.      Deep Tendon Reflexes:      Reflex Scores:       Tricep reflexes are 2+ on the right side and 2+ on the left side.       Bicep reflexes are 2+ on the right side and 2+ on the left side.       Brachioradialis reflexes are 2+ on the right side and 2+ on the left side.       Patellar reflexes are 2+ on the right side and 2+ on the left side.       Achilles reflexes are 2+ on the right side and 2+ on the left side.  Psychiatric:         Attention and Perception: Attention and perception normal.         Mood and Affect: Mood normal.         Speech: Speech normal.         Behavior: Behavior normal. Behavior is cooperative.         Thought Content: Thought content normal.         Cognition and Memory: Cognition and memory normal.         Judgment: Judgment normal.         Laboratory:  Recent Labs     02/25/24  1607   WBC 16.6*   RBC 4.43   HEMOGLOBIN 14.3   HEMATOCRIT 41.3   MCV 93.2   MCH 32.3   MCHC 34.6   RDW 43.5   PLATELETCT 481*   MPV 8.0*     Recent Labs     02/25/24  1607   SODIUM 133*   POTASSIUM 3.9   CHLORIDE 96   CO2 21   GLUCOSE 108*   BUN 19   CREATININE 0.37*   CALCIUM 8.7     Recent Labs     02/25/24  1607   ALTSGPT 8   ASTSGOT 13   ALKPHOSPHAT 143*   TBILIRUBIN 0.7   GLUCOSE 108*     Recent Labs     02/25/24  1607   INR 1.05     No results for input(s): \"NTPROBNP\" in the last 72 hours.      No results for input(s): \"TROPONINT\" in the last 72 hours.    Imaging:  DX-CHEST-PORTABLE (1 VIEW)   Final Result    "      1. No acute cardiopulmonary abnormalities are identified.      CT-PELVIS W/O PLUS RECONS   Final Result      No acute fracture detected.      CT-LSPINE W/O PLUS RECONS   Final Result      1.  Acute L2 superior endplate compression fracture.   2.  Additional findings as above.          X-Ray:  I have personally reviewed the images and compared with prior images.  EKG:  I have personally reviewed the images and compared with prior images.    Assessment/Plan:  Justification for Admission Status  I anticipate this patient is appropriate for observation status at this time because patient has suffered an L2 compression fracture which will need pain management and therapies.  This will be accomplished in 23 hours or less of hospital management.    Patient will need a Med/Surg bed on MEDICAL service .  The need is secondary to L2 compression fracture.    * Closed compression fracture of L2 lumbar vertebra, initial encounter ([facility])- (present on admission)  Assessment & Plan  Patient fell at assisted living on Thursday  She just moved into assisted living 5 days ago from [facility].  The patient's fall resulted initially in minimal pain but over time the patient's pain became excruciating.  Patient is now found to have a L2 compression fracture which is acute.  Initiate pain management, muscle relaxant, PT OT evaluation.  Patient will benefit from outpatient kyphoplasty and we will try to put this in already.    Diabetes mellitus type II, controlled ([facility])- (present on admission)  Assessment & Plan  Accu-Cheks with sliding scale coverage  Diabetic management  Continue metformin  Accu-Cheks with sliding scale coverage  Hemoglobin A1c level    Hypertension- (present on admission)  Assessment & Plan  Optimize blood pressure management keep systolic blood pressure less than 140 diastolic under 90  Continue with Avapro 300 mg daily and Norvasc 5 mg daily  As needed labetalol    Thrombocytosis- (present on  admission)  Assessment & Plan  Thrombocytosis of 481,000 this may be secondary to an early infection versus recurrent falls.  Continue to monitor platelet count.  If continues to be elevated may need a bone marrow evaluation    Elevated alkaline phosphatase level- (present on admission)  Assessment & Plan  Check a GGT level  Most likely from bone injury with her recurrent falls.  Patient apparently fell 2 weeks ago as well.    Hyponatremia- (present on admission)  Assessment & Plan  Mild hyponatremia 133  Give fluid gentle fluid resuscitation monitor sodium levels    Leukocytosis- (present on admission)  Assessment & Plan  Next leukocytosis of 16,600  This is concerning for acute infection  Patient complains of dysuria and frequency of urination.  She also has suprapubic tenderness.  I am going to initiate urine cultures, initiate 1 dose of Rocephin and blood cultures x 2  Also check a chest x-ray although chest x-ray physical examination is clear.  Also check a procalcitonin level  Check lactic acid level    DNR (do not resuscitate)- (present on admission)  Assessment & Plan  As discussed with patient she does not wish to be resuscitated and would like to be DNR CODE STATUS.    Dyslipidemia- (present on admission)  Assessment & Plan  Low-fat low-cholesterol diet  Continue with statin, Crestor 10 mg nightly  Fasting lipid panel        VTE prophylaxis: SCDs/TEDs

## 2024-02-26 NOTE — ASSESSMENT & PLAN NOTE
As discussed with patient she does not wish to be resuscitated and would like to be DNR CODE STATUS.

## 2024-02-26 NOTE — THERAPY
Physical Therapy   Initial Evaluation     Patient Name: Mariana Jett  Age:  89 y.o., Sex:  female  Medical Record #: [id number]  Today's Date: 2/26/2024     Precautions  Precautions: Fall Risk;Spinal / Back Precautions     Assessment  Patient is 89 y.o. female with a diagnosis of L2 compression fracture s/p fall. Pt has had severe back pain that has progressively gotten worse since her fall on Thursday.  Patient recently moved from [facility] and went right into assisted living.  She has been in assisted living for the past 5 days.  Pt is presenting with generalized weakness and back pain which intensifies with movement. Pt also with impaired balance, history of falls. Given the extent of pts overall debility recommend further therapy at SNF/acute rehab prior to DC home.    Plan    Physical Therapy Initial Treatment Plan   Treatment Plan : Bed Mobility, Gait Training, Stair Training, Therapeutic Activities, Therapeutic Exercise, Neuro Re-Education / Balance  Treatment Frequency: 5 Times per Week  Duration: Until Therapy Goals Met    DC Equipment Recommendations: Unable to determine at this time  Discharge Recommendations: Recommend post-acute placement for additional physical therapy services prior to discharge home        02/26/24 2328   Prior Living Situation   Housing / Facility Assisted Living Residence   Equipment Owned Front-Wheel Walker;4-Wheel Walker;Tub / Shower Seat;Grab Bar(s) In Tub / Shower;Raised Toilet Seat With Arms   Lives with - Patient's Self Care Capacity Alone and Able to Care For Self   Prior Level of Functional Mobility   Bed Mobility Independent   Transfer Status Independent   Ambulation Independent   Assistive Devices Used Front-Wheel Walker   Comments Pt reports falls regularly, uses walker all of the time   History of Falls   History of Falls Yes   Cognition    Comments Pt is oriented x4   Active ROM Lower Body    Active ROM Lower Body  WDL   Strength Lower Body   Lower Body  Strength  Generalized weakness equal bilaterally   Neurological Concerns   Neurological Concerns No   Balance Assessment   Sitting Balance (Static) Fair +   Sitting Balance (Dynamic) Fair   Standing Balance (Static) Poor +   Standing Balance (Dynamic) Poor   Weight Shift Sitting Fair   Weight Shift Standing Fair   Comments stdg with FWW   Bed Mobility    Supine to Sit Maximal Assist  (x2)   Sit to Supine Maximal Assist  (x2)   Rolling Moderate Assist to Lt.   Gait Analysis   Gait Level Of Assist Unable to Participate   Comments Pt was able to take a few side steps up side of bed with FWW Megan   Functional Mobility   Sit to Stand Minimal Assist   Activity Tolerance   Sitting Edge of Bed 3-4 min   Standing 3-4 min   Short Term Goals    Short Term Goal # 1 Pt will be able to perform bed mobility and sup <> sit Benjamin in 6 visits.   Short Term Goal # 2 Pt will be able to perform sit <>stand and transfer Benjamin in 6 visits.   Short Term Goal # 3 Pt will be able to ambulate  100 ft with FWW Benjamin in 6 visits.

## 2024-02-26 NOTE — ED NOTES
Night shift RN was told by Day shift that blood cultures where drawn so night shift RN did not draw them. Pt can not sit up in [facility] so RN does not feel safe given by oral medication. This was relayed to Marcelo on [facility] too.

## 2024-02-26 NOTE — ASSESSMENT & PLAN NOTE
Accu-Cheks with sliding scale coverage  Diabetic management  Continue metformin  Accu-Cheks with sliding scale coverage    2/27: Pending A1c

## 2024-02-26 NOTE — CARE PLAN
The patient is Stable - Low risk of patient condition declining or worsening         Progress made toward(s) clinical / shift goals:  Pt was free of falls throughout shift with fall precautions in place.    Patient is not progressing towards the following goals:

## 2024-02-26 NOTE — ASSESSMENT & PLAN NOTE
Next leukocytosis of 16,600  This is concerning for acute infection  Patient complains of dysuria and frequency of urination. straight cath urinalysis and culture have been ordered    2/27: We have started patient on ceftriaxone.

## 2024-02-26 NOTE — DISCHARGE PLANNING
[facility] Transitional Care Coordination    Referral from:  Dr. Alonso  Insurance Provider on Facesheet:  Medicare/[employer]  Potential Rehab diagnosis:  Other Ortho    Chart review indicates patient has ongoing medical management and therapy needs to possibly meet inpatient rehab facility criteria with the goal of returning to community.      D/C Support:  Daughter/Lives residential     Physiatry consult pended, waiting for additional information.  L2 compression fracture.  Would welcome PT as clinically appropriate.  TCC will follow.  Please reach out sooner if PMR consult requested for medical management.     Last Covid test:    Thank you for the referral.

## 2024-02-26 NOTE — DISCHARGE PLANNING
Case Management Discharge Planning    Admission Date: 2/25/2024  GMLOS:    ALOS: 0    6-Clicks ADL Score: 10  6-Clicks Mobility Score: 6  PT and/or OT Eval ordered: Yes  Post-acute Referrals Ordered: Yes  Post-acute Choice Obtained: No  Has referral(s) been sent to post-acute provider:  Yes      Anticipated Discharge Dispo: Discharge Disposition: Disch to IP rehab facility or distinct part unit (62)    DME Needed: No    Action(s) Taken:     RNCM attended IDT rounds and reviewed chart. Per IDT rounds, anticipate placement. Pt does not currently qualify for SNF due to OBS status. PMR consult placed per protocol.    Escalations Completed: None    Medically Clear: No    Next Steps:  f/u with medical team to discuss DC needs and barriers    Barriers to Discharge: Medical clearance, Pending Placement, and Pending PT Evaluation

## 2024-02-26 NOTE — CARE PLAN
"The patient is Stable - Low risk of patient condition declining or worsening    Shift Goals  Clinical Goals: maintain skin integrity, pain control <4/10 post interventions  Patient Goals: \"remain comfortable\"    Progress made toward(s) clinical / shift goals:  patient has been sat up from the bed 2 times today with assistance of 2. Pain managed with PRN medications along with scheduled    Patient is not progressing towards the following goals: n/a      "

## 2024-02-26 NOTE — THERAPY
"Occupational Therapy   Initial Evaluation     Patient Name: Mariana Jett  Age:  89 y.o., Sex:  female  Medical Record #: [id number]  Today's Date: 2/26/2024     Precautions  Precautions: Fall Risk, Spinal / Back Precautions     Assessment  Patient is 89 y.o. female recently moved from [facility] and went right into assisted living.  She has been in assisted living for the past 5 days.  On Thursday she suffered a fall and landed on her back Acute L2 superior endplate compression fracture.  H/o Diabetes mellitus type II, Hypertension, multiple falls per RN.  Pt is currently limited by decreased functional mobility, activity tolerance, strength,  balance, adherence to precautions, and pain which are affecting her ability to complete ADLs/IADLs at baseline. See grid for details. Pt will benefit from further inpt post acute therapy prior to home.  Pending kyphoplasty when stable to undergo this procedure.  Will continue to follow.       Plan    Occupational Therapy Initial Treatment Plan   Treatment Interventions: Self Care / Activities of Daily Living, Adaptive Equipment, Neuro Re-Education / Balance, Therapeutic Exercises, Therapeutic Activity  Treatment Frequency: 3 Times per Week  Duration: Until Therapy Goals Met    DC Equipment Recommendations: Unable to determine at this time  Discharge Recommendations: Recommend post-acute placement for additional occupational therapy services prior to discharge home     Subjective    \"I don't think a back brace would help at all.\"     Objective       02/26/24 0928   Prior Living Situation   Prior Services Housekeeping / Homemaker Services   Housing / Facility Assisted Living Residence  ([place])   Steps Into Home 0   Steps In Home 0   Elevator Yes   Bathroom Set up Walk In Shower;Grab Bars;Shower Chair   Equipment Owned Front-Wheel Walker;4-Wheel Walker;Tub / Shower Seat;Grab Bar(s) In Tub / Shower;Grab Bar(s) By Toilet;Raised Toilet Seat Without Arms   Lives with - " Patient's Self Care Capacity Alone and Able to Care For Self;Attendant / Paid Care Giver   Comments Pt recently moved from [facility] into [facility].  Family locally.  Pt hasn't been in this location more than a week and is not entirely sure exactly what services they provide (will need to verify with family as needed what services she is paying for like laundry etc.) Pt reports she is normally able to manage her own self care tasks on her own   Prior Level of ADL Function   Self Feeding Independent   Grooming / Hygiene Independent   Bathing Independent   Dressing Independent   Toileting Independent   Prior Level of IADL Function   Medication Management Independent   Laundry Unable To Determine At This Time   Kitchen Mobility Requires Assist   Finances Unable To Determine At This Time   Home Management Requires Assist   Shopping Requires Assist   Prior Level Of Mobility Independent With Device in Home   Driving / Transportation Relatives / Others Provide Transportation   Occupation (Pre-Hospital Vocational) Retired Due To Age   Leisure Interests Unable To Determine At This Time   History of Falls   History of Falls Yes   Date of Last Fall   (GLF within the last few days, sustained L2 compression fx)   Precautions   Precautions Fall Risk;Spinal / Back Precautions    Vitals   O2 (LPM) 2   O2 Delivery Device Silicone Nasal Cannula   Pain 0 - 10 Group   Location Back   Location Orientation Mid   Description Sharp   Therapist Pain Assessment During Activity;Nurse Notified   Cognition    Level of Consciousness Alert   Comments Oriented x4, some details of her current home setup are lacking due to pt just moving into [facility] here within the week   Active ROM Upper Body   Active ROM Upper Body  WDL   Dominant Hand Right   Strength Upper Body   Upper Body Strength  WDL   Coordination Upper Body   Coordination WDL   Balance Assessment   Sitting Balance (Static) Fair +   Sitting Balance (Dynamic) Fair   Standing  Balance (Static) Poor +   Standing Balance (Dynamic) Poor   Weight Shift Sitting Fair   Weight Shift Standing Fair   Comments standing with FWW   Bed Mobility    Supine to Sit Maximal Assist  (x2)   Sit to Supine Maximal Assist  (x2)   Rolling Maximum Assist to Lt.   ADL Assessment   Eating   (declined meal)   Upper Body Dressing Moderate Assist  (gown change)   Lower Body Dressing Total Assist   Toileting Total Assist  (purewik)   How much help from another person does the patient currently need...   Putting on and taking off regular lower body clothing? 1   Bathing (including washing, rinsing, and drying)? 1   Toileting, which includes using a toilet, bedpan, or urinal? 1   Putting on and taking off regular upper body clothing? 2   Taking care of personal grooming such as brushing teeth? 2   Eating meals? 3   6 Clicks Daily Activity Score 10   Functional Mobility   Sit to Stand Minimal Assist   Bed, Chair, Wheelchair Transfer Unable to Participate   Toilet Transfers Unable to Participate   Mobility standing only, with 1-2 side steps with FWW   Comments limited by pain   Visual Perception   Visual Perception  WDL   Activity Tolerance   Sitting Edge of Bed 2minn x2   Standing 3 min   Patient / Family Goals   Patient / Family Goal #1 be out of pain   Short Term Goals   Short Term Goal # 1 Pt will dress FB with Megan in 4 visits   Short Term Goal # 2 Pt will toilet on BSC SBA in 4 visits   Short Term Goal # 3 Pt will toilet in BR with CGA in 5 visits   Short Term Goal # 4 Pt will tolerate 8  min standing to groom with CGA in 4 visits   Education Group   Education Provided Role of Occupational Therapist;Spinal Precautions   Role of Occupational Therapist Patient Response Patient;Acceptance;Explanation;Verbal Demonstration   Spinal Precautions Patient Response Patient;Acceptance;Explanation;Verbal Demonstration   Additional Comments Pt educated on log roll for be mobility, OOB activity as hannah  to maintain strength and  help with pain

## 2024-02-26 NOTE — PROGRESS NOTES
4 Eyes Skin Assessment Completed by JATINDER Roa and JATINDER Duarte.    Head WDL  Ears WDL  Nose WDL  Mouth WDL  Neck WDL  Breast/Chest WDL  Shoulder Blades WDL  Spine WDL  (R) Arm/Elbow/Hand Redness and Blanching  (L) Arm/Elbow/Hand Redness, Blanching, and Bruising  Abdomen WDL  Groin WDL  Scrotum/Coccyx/Buttocks WDL  (R) Leg WDL  (L) Leg WDL  (R) Heel/Foot/Toe Redness and Blanching  (L) Heel/Foot/Toe Redness and Blanching          Devices In Places Blood Pressure Cuff and Pulse Ox, Purewick      Interventions In Place Waffle Overlay, TAP System, and Pressure Redistribution Mattress    Possible Skin Injury No    Pictures Uploaded Into Epic N/A  Wound Consult Placed N/A  RN Wound Prevention Protocol Ordered No

## 2024-02-26 NOTE — ASSESSMENT & PLAN NOTE
Optimize blood pressure management keep systolic blood pressure less than 140 diastolic under 90  Continue with Avapro 300 mg daily and Norvasc 5 mg daily  As needed labetalol

## 2024-02-26 NOTE — DISCHARGE PLANNING
Dr. Pride reviewed current recommendation for skilled nursing. In the event of interval improvement with participation with therapy prior to being medically cleared. Please reach back to TCC for follow up.

## 2024-02-26 NOTE — PROGRESS NOTES
Received report from Janina TOBIAS at 1915. Pt denies pain, SOB, or dizziness at this time. Pt is AxO 4

## 2024-02-26 NOTE — ED TRIAGE NOTES
Pt bib EMS with c/o a GLF 2 days ago. She states that during this time she injured her back and now has 10/10 back pain. Per pt she was able to ambulate prior to the fall but now can't move without severe pain

## 2024-02-26 NOTE — ED NOTES
Medication history reviewed with pt. Med rec is complete.  Allergies reviewed, per pt    Pt had a list of medications at bedside. Went over list of medications and returned pts list back to her chart.  Pt reports that she is not taking a MULTIVITAMIN or CALCIUM in the last 30 days or longer.    Patient has not had any outpatient antibiotics in the last 30 days.    Pt is not on any anticoagulants

## 2024-02-26 NOTE — ASSESSMENT & PLAN NOTE
Check a GGT level  Most likely from bone injury with her recurrent falls.  Patient apparently fell 2 weeks ago as well.    2/27: GGT within normal limits.  I suspect elevated alkaline phosphatase due to bone.  It seems to be decreasing.  Repeat CMP.

## 2024-02-26 NOTE — PROGRESS NOTES
[facility] Medicine Daily Progress Note    Date of Service  2/26/2024    Chief Complaint  Mariana Jett is a 89 y.o. female admitted 2/25/2024 with intractable low back pain after fall at assisted living.  Patient had had previous compression fractures and kyphoplasty she recently moved here from [facility] and despite using assistive devices she was continuing to fall while at assisted living, she fell the other day and had worsening low back pain and now can barely move or ambulate.    Hospital Course      Interval Problem Update  Patient still having pain but was able to get up with physical therapy she may be a good candidate for acute rehab once she has her procedure.    Urinalysis has not yet been obtained, discussed with nurse that order was placed for straight cath.    I have discussed this patient's plan of care and discharge plan at IDT rounds today with Case Management, Nursing, Nursing leadership, and other members of the IDT team.    Consultants/Specialty  None    Code Status  DNAR/DNI    Disposition  The patient is not medically cleared for discharge to home or a post-acute facility.  Anticipate discharge to: an inpatient rehabilitation hospital    I have placed the appropriate orders for post-discharge needs.    Review of Systems  Review of Systems   Respiratory:  Negative for shortness of breath.    Cardiovascular:  Negative for chest pain.   Gastrointestinal:  Negative for nausea.   Genitourinary:  Positive for dysuria.   Musculoskeletal:  Positive for back pain.   Neurological:  Positive for weakness.        Falls        Physical Exam  Temp:  [36.3 °C (97.3 °F)-37 °C (98.6 °F)] 36.4 °C (97.5 °F)  Pulse:  [7-92] 75  Resp:  [16-18] 16  BP: (130-188)/(54-83) 143/55  SpO2:  [91 %-98 %] 93 %    Physical Exam  Vitals and nursing note reviewed.   Constitutional:       General: She is not in acute distress.     Appearance: She is normal weight. She is not ill-appearing.   HENT:      Head: Normocephalic and  atraumatic.      Nose: Nose normal.      Mouth/Throat:      Mouth: Mucous membranes are moist.   Eyes:      Extraocular Movements: Extraocular movements intact.      Pupils: Pupils are equal, round, and reactive to light.   Cardiovascular:      Rate and Rhythm: Normal rate and regular rhythm.   Pulmonary:      Effort: Pulmonary effort is normal.      Breath sounds: Normal breath sounds.   Abdominal:      General: Bowel sounds are normal. There is no distension.      Palpations: Abdomen is soft.      Tenderness: There is no abdominal tenderness.   Musculoskeletal:      Right lower leg: No edema.      Left lower leg: No edema.      Comments: Patient lying on her back when seen did not turn her, per admitting doctor she is tender over her lumbar spine   Skin:     General: Skin is warm and dry.   Neurological:      General: No focal deficit present.      Mental Status: She is alert and oriented to person, place, and time. Mental status is at baseline.      Cranial Nerves: No cranial nerve deficit.      Motor: No weakness.   Psychiatric:         Mood and Affect: Mood normal.         Behavior: Behavior normal.         Fluids    Intake/Output Summary (Last 24 hours) at 2/26/2024 1453  Last data filed at 2/26/2024 1156  Gross per 24 hour   Intake 460 ml   Output 400 ml   Net 60 ml       Laboratory  Recent Labs     02/25/24  1607 02/26/24  0153   WBC 16.6* 15.1*   RBC 4.43 3.94*   HEMOGLOBIN 14.3 12.7   HEMATOCRIT 41.3 37.0   MCV 93.2 93.9   MCH 32.3 32.2   MCHC 34.6 34.3   RDW 43.5 43.8   PLATELETCT 481* 395   MPV 8.0* 7.7*     Recent Labs     02/25/24  1607 02/26/24  0153   SODIUM 133* 136   POTASSIUM 3.9 3.5*   CHLORIDE 96 101   CO2 21 21   GLUCOSE 108* 87   BUN 19 17   CREATININE 0.37* 0.30*   CALCIUM 8.7 8.1*     Recent Labs     02/25/24  1607   INR 1.05               Imaging  DX-CHEST-PORTABLE (1 VIEW)   Final Result         1. No acute cardiopulmonary abnormalities are identified.      CT-PELVIS W/O PLUS RECONS    Final Result      No acute fracture detected.      CT-LSPINE W/O PLUS RECONS   Final Result      1.  Acute L2 superior endplate compression fracture.   2.  Additional findings as above.           Assessment/Plan  * Closed compression fracture of L2 lumbar vertebra, initial encounter ([facility])- (present on admission)  Assessment & Plan  Patient fell at assisted living on Thursday  She just moved into assisted living 5 days ago from [facility].  The patient's fall resulted initially in minimal pain but over time the patient's pain became excruciating.  Patient is now found to have a L2 compression fracture which is acute.  Initiate pain management, muscle relaxant, Lidoderm patch ,PT OT -Will likely need to go to SNF as she has been falling.  Can get kyphoplasty outpatient at [facility]-aspirin has been held but per radiology there is no timeframe that would prevent her from having the procedure as soon as it could be scheduled.    DNR (do not resuscitate)- (present on admission)  Assessment & Plan  As discussed with patient she does not wish to be resuscitated and would like to be DNR CODE STATUS.    Dyslipidemia  Assessment & Plan  Low-fat low-cholesterol diet  Continue with statin, Crestor 10 mg nightly  Fasting lipid panel    Diabetes mellitus type II, controlled ([facility])- (present on admission)  Assessment & Plan  Accu-Cheks with sliding scale coverage  Diabetic management  Continue metformin  Accu-Cheks with sliding scale coverage  Hemoglobin A1c level    Hypertension- (present on admission)  Assessment & Plan  Optimize blood pressure management keep systolic blood pressure less than 140 diastolic under 90  Continue with Avapro 300 mg daily and Norvasc 5 mg daily  As needed labetalol    Thrombocytosis- (present on admission)  Assessment & Plan  Thrombocytosis of 481,000 this may be secondary to an early infection versus recurrent falls.  Continue to monitor platelet count.  If continues to be elevated may  need a bone marrow evaluation    Elevated alkaline phosphatase level- (present on admission)  Assessment & Plan  Check a GGT level  Most likely from bone injury with her recurrent falls.  Patient apparently fell 2 weeks ago as well.    Hyponatremia- (present on admission)  Assessment & Plan  Mild hyponatremia 133  Give fluid gentle fluid resuscitation monitor sodium levels    Leukocytosis- (present on admission)  Assessment & Plan  Next leukocytosis of 16,600  This is concerning for acute infection  Patient complains of dysuria and frequency of urination. straight cath urinalysis and culture have been ordered         VTE prophylaxis:   SCDs/TEDs      I have performed a physical exam and reviewed and updated ROS and Plan today (2/26/2024). In review of yesterday's note (2/25/2024), there are no changes except as documented above.

## 2024-02-27 ENCOUNTER — HOSPITAL ENCOUNTER (INPATIENT)
Facility: MEDICAL CENTER | Age: 89
LOS: 1 days | DRG: 516 | End: 2024-03-01
Attending: HOSPITALIST | Admitting: INTERNAL MEDICINE
Payer: MEDICARE

## 2024-02-27 VITALS
HEIGHT: 66 IN | HEART RATE: 78 BPM | WEIGHT: 103.62 LBS | BODY MASS INDEX: 16.65 KG/M2 | DIASTOLIC BLOOD PRESSURE: 66 MMHG | RESPIRATION RATE: 16 BRPM | SYSTOLIC BLOOD PRESSURE: 152 MMHG | OXYGEN SATURATION: 95 % | TEMPERATURE: 98.4 F

## 2024-02-27 DIAGNOSIS — N30.00 ACUTE CYSTITIS WITHOUT HEMATURIA: ICD-10-CM

## 2024-02-27 DIAGNOSIS — S32.020A CLOSED COMPRESSION FRACTURE OF L2 LUMBAR VERTEBRA, INITIAL ENCOUNTER (HCC): ICD-10-CM

## 2024-02-27 PROBLEM — S32.000A LUMBAR COMPRESSION FRACTURE, CLOSED, INITIAL ENCOUNTER (HCC): Status: ACTIVE | Noted: 2024-02-27

## 2024-02-27 PROBLEM — N39.0 UTI (URINARY TRACT INFECTION): Status: ACTIVE | Noted: 2024-02-27

## 2024-02-27 PROBLEM — Z71.89 ADVANCE CARE PLANNING: Status: ACTIVE | Noted: 2024-02-27

## 2024-02-27 LAB
ALBUMIN SERPL BCP-MCNC: 3.5 G/DL (ref 3.2–4.9)
ALBUMIN/GLOB SERPL: 1.7 G/DL
ALP SERPL-CCNC: 124 U/L (ref 30–99)
ALT SERPL-CCNC: 7 U/L (ref 2–50)
ANION GAP SERPL CALC-SCNC: 10 MMOL/L (ref 7–16)
AST SERPL-CCNC: 11 U/L (ref 12–45)
BASOPHILS # BLD AUTO: 0.3 % (ref 0–1.8)
BASOPHILS # BLD: 0.05 K/UL (ref 0–0.12)
BILIRUB SERPL-MCNC: 0.5 MG/DL (ref 0.1–1.5)
BUN SERPL-MCNC: 21 MG/DL (ref 8–22)
CALCIUM ALBUM COR SERPL-MCNC: 8.9 MG/DL (ref 8.5–10.5)
CALCIUM SERPL-MCNC: 8.5 MG/DL (ref 8.4–10.2)
CHLORIDE SERPL-SCNC: 98 MMOL/L (ref 96–112)
CO2 SERPL-SCNC: 27 MMOL/L (ref 20–33)
CREAT SERPL-MCNC: 0.36 MG/DL (ref 0.5–1.4)
EOSINOPHIL # BLD AUTO: 0.18 K/UL (ref 0–0.51)
EOSINOPHIL NFR BLD: 1.2 % (ref 0–6.9)
ERYTHROCYTE [DISTWIDTH] IN BLOOD BY AUTOMATED COUNT: 44.2 FL (ref 35.9–50)
EST. AVERAGE GLUCOSE BLD GHB EST-MCNC: 137 MG/DL
GFR SERPLBLD CREATININE-BSD FMLA CKD-EPI: 97 ML/MIN/1.73 M 2
GLOBULIN SER CALC-MCNC: 2.1 G/DL (ref 1.9–3.5)
GLUCOSE BLD STRIP.AUTO-MCNC: 120 MG/DL (ref 65–99)
GLUCOSE BLD STRIP.AUTO-MCNC: 121 MG/DL (ref 65–99)
GLUCOSE BLD STRIP.AUTO-MCNC: 141 MG/DL (ref 65–99)
GLUCOSE BLD STRIP.AUTO-MCNC: 197 MG/DL (ref 65–99)
GLUCOSE SERPL-MCNC: 124 MG/DL (ref 65–99)
HBA1C MFR BLD: 6.4 % (ref 4–5.6)
HCT VFR BLD AUTO: 35.3 % (ref 37–47)
HGB BLD-MCNC: 12.1 G/DL (ref 12–16)
IMM GRANULOCYTES # BLD AUTO: 0.1 K/UL (ref 0–0.11)
IMM GRANULOCYTES NFR BLD AUTO: 0.7 % (ref 0–0.9)
LYMPHOCYTES # BLD AUTO: 1.84 K/UL (ref 1–4.8)
LYMPHOCYTES NFR BLD: 12 % (ref 22–41)
MAGNESIUM SERPL-MCNC: 1.7 MG/DL (ref 1.5–2.5)
MCH RBC QN AUTO: 32.2 PG (ref 27–33)
MCHC RBC AUTO-ENTMCNC: 34.3 G/DL (ref 32.2–35.5)
MCV RBC AUTO: 93.9 FL (ref 81.4–97.8)
MONOCYTES # BLD AUTO: 1.86 K/UL (ref 0–0.85)
MONOCYTES NFR BLD AUTO: 12.1 % (ref 0–13.4)
NEUTROPHILS # BLD AUTO: 11.35 K/UL (ref 1.82–7.42)
NEUTROPHILS NFR BLD: 73.7 % (ref 44–72)
NRBC # BLD AUTO: 0 K/UL
NRBC BLD-RTO: 0 /100 WBC (ref 0–0.2)
PLATELET # BLD AUTO: 361 K/UL (ref 164–446)
PMV BLD AUTO: 7.7 FL (ref 9–12.9)
POTASSIUM SERPL-SCNC: 4.1 MMOL/L (ref 3.6–5.5)
PROT SERPL-MCNC: 5.6 G/DL (ref 6–8.2)
RBC # BLD AUTO: 3.76 M/UL (ref 4.2–5.4)
SODIUM SERPL-SCNC: 135 MMOL/L (ref 135–145)
WBC # BLD AUTO: 15.4 K/UL (ref 4.8–10.8)

## 2024-02-27 PROCEDURE — A9270 NON-COVERED ITEM OR SERVICE: HCPCS | Performed by: HOSPITALIST

## 2024-02-27 PROCEDURE — 83036 HEMOGLOBIN GLYCOSYLATED A1C: CPT

## 2024-02-27 PROCEDURE — 82962 GLUCOSE BLOOD TEST: CPT | Mod: 91

## 2024-02-27 PROCEDURE — 700102 HCHG RX REV CODE 250 W/ 637 OVERRIDE(OP): Performed by: INTERNAL MEDICINE

## 2024-02-27 PROCEDURE — 82962 GLUCOSE BLOOD TEST: CPT

## 2024-02-27 PROCEDURE — 700105 HCHG RX REV CODE 258: Performed by: INTERNAL MEDICINE

## 2024-02-27 PROCEDURE — G0378 HOSPITAL OBSERVATION PER HR: HCPCS

## 2024-02-27 PROCEDURE — A9270 NON-COVERED ITEM OR SERVICE: HCPCS | Performed by: INTERNAL MEDICINE

## 2024-02-27 PROCEDURE — 96372 THER/PROPH/DIAG INJ SC/IM: CPT

## 2024-02-27 PROCEDURE — 36415 COLL VENOUS BLD VENIPUNCTURE: CPT

## 2024-02-27 PROCEDURE — 700111 HCHG RX REV CODE 636 W/ 250 OVERRIDE (IP): Mod: JZ | Performed by: INTERNAL MEDICINE

## 2024-02-27 PROCEDURE — 80053 COMPREHEN METABOLIC PANEL: CPT

## 2024-02-27 PROCEDURE — 85025 COMPLETE CBC W/AUTO DIFF WBC: CPT

## 2024-02-27 PROCEDURE — 99223 1ST HOSP IP/OBS HIGH 75: CPT | Mod: AI,25 | Performed by: INTERNAL MEDICINE

## 2024-02-27 PROCEDURE — 99497 ADVNCD CARE PLAN 30 MIN: CPT | Performed by: INTERNAL MEDICINE

## 2024-02-27 PROCEDURE — 700101 HCHG RX REV CODE 250: Performed by: INTERNAL MEDICINE

## 2024-02-27 PROCEDURE — 94760 N-INVAS EAR/PLS OXIMETRY 1: CPT

## 2024-02-27 PROCEDURE — 87186 SC STD MICRODIL/AGAR DIL: CPT

## 2024-02-27 PROCEDURE — 87077 CULTURE AEROBIC IDENTIFY: CPT

## 2024-02-27 PROCEDURE — 700102 HCHG RX REV CODE 250 W/ 637 OVERRIDE(OP): Performed by: HOSPITALIST

## 2024-02-27 PROCEDURE — 83735 ASSAY OF MAGNESIUM: CPT

## 2024-02-27 PROCEDURE — 87086 URINE CULTURE/COLONY COUNT: CPT

## 2024-02-27 RX ORDER — ONDANSETRON 2 MG/ML
4 INJECTION INTRAMUSCULAR; INTRAVENOUS EVERY 4 HOURS PRN
Status: CANCELLED | OUTPATIENT
Start: 2024-02-27

## 2024-02-27 RX ORDER — LABETALOL HYDROCHLORIDE 5 MG/ML
10 INJECTION, SOLUTION INTRAVENOUS EVERY 4 HOURS PRN
Status: DISCONTINUED | OUTPATIENT
Start: 2024-02-27 | End: 2024-03-01 | Stop reason: HOSPADM

## 2024-02-27 RX ORDER — IRBESARTAN 150 MG/1
300 TABLET ORAL DAILY
Status: CANCELLED | OUTPATIENT
Start: 2024-02-28

## 2024-02-27 RX ORDER — AMLODIPINE BESYLATE 5 MG/1
5 TABLET ORAL DAILY
Status: CANCELLED | OUTPATIENT
Start: 2024-02-28

## 2024-02-27 RX ORDER — AMLODIPINE BESYLATE 5 MG/1
5 TABLET ORAL DAILY
Status: DISCONTINUED | OUTPATIENT
Start: 2024-02-28 | End: 2024-03-01 | Stop reason: HOSPADM

## 2024-02-27 RX ORDER — VITAMIN B COMPLEX
1000 TABLET ORAL DAILY
Status: CANCELLED | OUTPATIENT
Start: 2024-02-28

## 2024-02-27 RX ORDER — DEXTROSE MONOHYDRATE 25 G/50ML
25 INJECTION, SOLUTION INTRAVENOUS
Status: CANCELLED | OUTPATIENT
Start: 2024-02-27

## 2024-02-27 RX ORDER — OXYCODONE HYDROCHLORIDE 5 MG/1
5 TABLET ORAL
Status: DISCONTINUED | OUTPATIENT
Start: 2024-02-27 | End: 2024-03-01 | Stop reason: HOSPADM

## 2024-02-27 RX ORDER — ONDANSETRON 4 MG/1
4 TABLET, ORALLY DISINTEGRATING ORAL EVERY 4 HOURS PRN
Status: DISCONTINUED | OUTPATIENT
Start: 2024-02-27 | End: 2024-03-01 | Stop reason: HOSPADM

## 2024-02-27 RX ORDER — ACETAMINOPHEN 325 MG/1
650 TABLET ORAL EVERY 6 HOURS PRN
Status: CANCELLED | OUTPATIENT
Start: 2024-02-27

## 2024-02-27 RX ORDER — OXYCODONE HYDROCHLORIDE 5 MG/1
5 TABLET ORAL
Status: CANCELLED | OUTPATIENT
Start: 2024-02-27

## 2024-02-27 RX ORDER — METHOCARBAMOL 500 MG/1
500 TABLET, FILM COATED ORAL 4 TIMES DAILY
Status: CANCELLED | OUTPATIENT
Start: 2024-02-27

## 2024-02-27 RX ORDER — VITAMIN B COMPLEX
1000 TABLET ORAL DAILY
Status: DISCONTINUED | OUTPATIENT
Start: 2024-02-28 | End: 2024-03-01 | Stop reason: HOSPADM

## 2024-02-27 RX ORDER — OXYCODONE HYDROCHLORIDE 5 MG/1
2.5 TABLET ORAL
Status: DISCONTINUED | OUTPATIENT
Start: 2024-02-27 | End: 2024-03-01 | Stop reason: HOSPADM

## 2024-02-27 RX ORDER — LABETALOL HYDROCHLORIDE 5 MG/ML
10 INJECTION, SOLUTION INTRAVENOUS EVERY 4 HOURS PRN
Status: CANCELLED | OUTPATIENT
Start: 2024-02-27

## 2024-02-27 RX ORDER — POLYETHYLENE GLYCOL 3350 17 G/17G
1 POWDER, FOR SOLUTION ORAL 2 TIMES DAILY
Status: CANCELLED | OUTPATIENT
Start: 2024-02-27

## 2024-02-27 RX ORDER — METHOCARBAMOL 500 MG/1
500 TABLET, FILM COATED ORAL 4 TIMES DAILY
Status: DISCONTINUED | OUTPATIENT
Start: 2024-02-27 | End: 2024-03-01 | Stop reason: HOSPADM

## 2024-02-27 RX ORDER — ONDANSETRON 4 MG/1
4 TABLET, ORALLY DISINTEGRATING ORAL EVERY 4 HOURS PRN
Status: CANCELLED | OUTPATIENT
Start: 2024-02-27

## 2024-02-27 RX ORDER — OXYCODONE HYDROCHLORIDE 5 MG/1
2.5 TABLET ORAL
Status: CANCELLED | OUTPATIENT
Start: 2024-02-27

## 2024-02-27 RX ORDER — ACETAMINOPHEN 325 MG/1
650 TABLET ORAL EVERY 6 HOURS PRN
Status: DISCONTINUED | OUTPATIENT
Start: 2024-02-27 | End: 2024-03-01 | Stop reason: HOSPADM

## 2024-02-27 RX ORDER — LIDOCAINE 4 G/G
1 PATCH TOPICAL EVERY 24 HOURS
Status: DISCONTINUED | OUTPATIENT
Start: 2024-02-28 | End: 2024-03-01 | Stop reason: HOSPADM

## 2024-02-27 RX ORDER — ROSUVASTATIN CALCIUM 10 MG/1
10 TABLET, COATED ORAL DAILY
Status: DISCONTINUED | OUTPATIENT
Start: 2024-02-28 | End: 2024-03-01 | Stop reason: HOSPADM

## 2024-02-27 RX ORDER — ROSUVASTATIN CALCIUM 10 MG/1
10 TABLET, COATED ORAL DAILY
Status: CANCELLED | OUTPATIENT
Start: 2024-02-28

## 2024-02-27 RX ORDER — ONDANSETRON 2 MG/ML
4 INJECTION INTRAMUSCULAR; INTRAVENOUS EVERY 4 HOURS PRN
Status: DISCONTINUED | OUTPATIENT
Start: 2024-02-27 | End: 2024-03-01 | Stop reason: HOSPADM

## 2024-02-27 RX ORDER — IRBESARTAN 150 MG/1
300 TABLET ORAL DAILY
Status: DISCONTINUED | OUTPATIENT
Start: 2024-02-28 | End: 2024-03-01 | Stop reason: HOSPADM

## 2024-02-27 RX ORDER — LIDOCAINE 50 MG/G
1 PATCH TOPICAL EVERY 24 HOURS
Status: CANCELLED | OUTPATIENT
Start: 2024-02-28

## 2024-02-27 RX ORDER — POLYETHYLENE GLYCOL 3350 17 G/17G
1 POWDER, FOR SOLUTION ORAL 2 TIMES DAILY
Status: DISCONTINUED | OUTPATIENT
Start: 2024-02-28 | End: 2024-03-01 | Stop reason: HOSPADM

## 2024-02-27 RX ORDER — GABAPENTIN 100 MG/1
100 CAPSULE ORAL 3 TIMES DAILY
Status: DISCONTINUED | OUTPATIENT
Start: 2024-02-28 | End: 2024-03-01 | Stop reason: HOSPADM

## 2024-02-27 RX ORDER — GABAPENTIN 100 MG/1
100 CAPSULE ORAL 3 TIMES DAILY
Status: CANCELLED | OUTPATIENT
Start: 2024-02-27

## 2024-02-27 RX ADMIN — METHOCARBAMOL 500 MG: 500 TABLET ORAL at 17:13

## 2024-02-27 RX ADMIN — Medication 1000 UNITS: at 06:10

## 2024-02-27 RX ADMIN — INSULIN HUMAN 1 UNITS: 100 INJECTION, SOLUTION PARENTERAL at 11:24

## 2024-02-27 RX ADMIN — CEFTRIAXONE SODIUM 1000 MG: 1 INJECTION, POWDER, FOR SOLUTION INTRAMUSCULAR; INTRAVENOUS at 12:05

## 2024-02-27 RX ADMIN — GABAPENTIN 100 MG: 100 CAPSULE ORAL at 17:13

## 2024-02-27 RX ADMIN — GABAPENTIN 100 MG: 100 CAPSULE ORAL at 06:10

## 2024-02-27 RX ADMIN — METHOCARBAMOL 500 MG: 500 TABLET ORAL at 12:05

## 2024-02-27 RX ADMIN — OXYCODONE HYDROCHLORIDE 5 MG: 5 TABLET ORAL at 03:54

## 2024-02-27 RX ADMIN — METHOCARBAMOL 500 MG: 500 TABLET ORAL at 21:47

## 2024-02-27 RX ADMIN — IRBESARTAN 300 MG: 150 TABLET ORAL at 06:11

## 2024-02-27 RX ADMIN — AMLODIPINE BESYLATE 5 MG: 5 TABLET ORAL at 06:11

## 2024-02-27 RX ADMIN — GABAPENTIN 100 MG: 100 CAPSULE ORAL at 11:23

## 2024-02-27 RX ADMIN — LIDOCAINE 1 PATCH: 700 PATCH TOPICAL at 07:50

## 2024-02-27 RX ADMIN — METFORMIN HYDROCHLORIDE 500 MG: 500 TABLET ORAL at 06:11

## 2024-02-27 RX ADMIN — ROSUVASTATIN 10 MG: 10 TABLET, FILM COATED ORAL at 06:10

## 2024-02-27 RX ADMIN — METHOCARBAMOL 500 MG: 500 TABLET ORAL at 09:32

## 2024-02-27 ASSESSMENT — ENCOUNTER SYMPTOMS
DIZZINESS: 0
VOMITING: 0
DOUBLE VISION: 0
CHILLS: 0
BLURRED VISION: 0
SHORTNESS OF BREATH: 0
WEAKNESS: 1
ABDOMINAL PAIN: 0
BACK PAIN: 1
NAUSEA: 0
PALPITATIONS: 0
COUGH: 0
HEADACHES: 0
FEVER: 0
FALLS: 1
HEARTBURN: 0
NERVOUS/ANXIOUS: 0

## 2024-02-27 ASSESSMENT — PATIENT HEALTH QUESTIONNAIRE - PHQ9
SUM OF ALL RESPONSES TO PHQ9 QUESTIONS 1 AND 2: 0
1. LITTLE INTEREST OR PLEASURE IN DOING THINGS: NOT AT ALL
2. FEELING DOWN, DEPRESSED, IRRITABLE, OR HOPELESS: NOT AT ALL

## 2024-02-27 ASSESSMENT — PAIN DESCRIPTION - PAIN TYPE
TYPE: ACUTE PAIN

## 2024-02-27 ASSESSMENT — COGNITIVE AND FUNCTIONAL STATUS - GENERAL
HELP NEEDED FOR BATHING: A LOT
TOILETING: A LOT
CLIMB 3 TO 5 STEPS WITH RAILING: A LOT
MOBILITY SCORE: 12
MOVING FROM LYING ON BACK TO SITTING ON SIDE OF FLAT BED: A LOT
DAILY ACTIVITIY SCORE: 17
MOVING TO AND FROM BED TO CHAIR: A LOT
SUGGESTED CMS G CODE MODIFIER MOBILITY: CL
WALKING IN HOSPITAL ROOM: A LOT
TURNING FROM BACK TO SIDE WHILE IN FLAT BAD: A LOT
DRESSING REGULAR LOWER BODY CLOTHING: A LOT
SUGGESTED CMS G CODE MODIFIER DAILY ACTIVITY: CK
STANDING UP FROM CHAIR USING ARMS: A LOT
DRESSING REGULAR UPPER BODY CLOTHING: A LITTLE

## 2024-02-27 ASSESSMENT — LIFESTYLE VARIABLES
TOTAL SCORE: 0
SUBSTANCE_ABUSE: 0
ON A TYPICAL DAY WHEN YOU DRINK ALCOHOL HOW MANY DRINKS DO YOU HAVE: 1
TOTAL SCORE: 0
HAVE YOU EVER FELT YOU SHOULD CUT DOWN ON YOUR DRINKING: NO
CONSUMPTION TOTAL: INCOMPLETE
EVER FELT BAD OR GUILTY ABOUT YOUR DRINKING: NO
TOTAL SCORE: 0
ALCOHOL_USE: YES
EVER HAD A DRINK FIRST THING IN THE MORNING TO STEADY YOUR NERVES TO GET RID OF A HANGOVER: NO
HAVE PEOPLE ANNOYED YOU BY CRITICIZING YOUR DRINKING: NO

## 2024-02-27 NOTE — DISCHARGE SUMMARY
"Discharge Summary    CHIEF COMPLAINT ON ADMISSION  Chief Complaint   Patient presents with    Fall    Back Pain       Reason for Admission  EMS     Admission Date  2/25/2024    CODE STATUS  DNAR/DNI    HPI & HOSPITAL COURSE  As per chart review:  \"89 y.o. female who presented 2/25/2024 with severe back pain that has progressively gotten worse since her fall on Thursday.  Patient recently moved from [facility] to [place] and went right into assisted living.  She has been in assisted living for the past 5 days.  On Thursday she suffered a fall and landed on her back and since then she has been progressively getting worse and worse on her pain.  As her pain has elevated she has become increasingly more sedentary and is unable to walk at this point.  Patient does came in with her daughter to the emergency room.  She was evaluated and found to have an L2 compression fracture. She has had 2 kyphoplasty's previously. The patient also was found to have a white blood cell count of 16,600 and she tells me that she has been having suprapubic tenderness, frequency of urination and also some dysuria. Patient at this point will be evaluated urine analysis.\"    Patient remained hospitalized.  Patient continues with significant back pain.  I discussed case with interventional radiology with Dr. Castillo who recommended kyphoplasty.  Unfortunately we do not have kyphoplasty capabilities at [facility] so the patient will be transferred to [facility] for further management and care.  The patient has been started on ceftriaxone still pending urine and blood cultures.    The patient seen at bedside this morning.  The patient laying in bed, in seems to be in distress due to acute pain.  I was able to talk to the patient and the patient's daughter about transferring the patient to [facility] and they are agreeable.      Discharge Date  02/27/2024    FOLLOW UP ITEMS POST DISCHARGE  Patient will be transferred to [facility] for " further management and care and kyphoplasty by IR    DISCHARGE DIAGNOSES  Principal Problem:    Closed compression fracture of L2 lumbar vertebra, initial encounter ([facility]) (POA: Yes)  Active Problems:    UTI (urinary tract infection) (POA: Unknown)    Leukocytosis (POA: Yes)    Hyponatremia (POA: Yes)    Elevated alkaline phosphatase level (POA: Yes)    Thrombocytosis (POA: Yes)    Hypertension (POA: Yes)    Diabetes mellitus type II, controlled ([facility]) (POA: Yes)    Dyslipidemia (POA: Yes)    Advance care planning (POA: Unknown)  Resolved Problems:    * No resolved hospital problems. *      FOLLOW UP  Patient to be transferred to [facility] for further management and care.    MEDICATIONS ON DISCHARGE     Medication List        ASK your doctor about these medications        Instructions   Acetaminophen 500 MG Caps   Take 500-1,000 mg by mouth 2 times a day as needed. Indications: Pain  Dose: 500-1,000 mg     Actonel 35 MG Tabs  Generic drug: risedronate   Take 35 mg by mouth every 7 days. On Monday  Dose: 35 mg     amLODIPine 5 MG Tabs  Commonly known as: Norvasc   Take 5 mg by mouth every day.  Dose: 5 mg     aspirin 81 MG EC tablet   Take 81 mg by mouth every day.  Dose: 81 mg     Cranberry 1000 MG Caps   Take 1,000 mg by mouth every day.  Dose: 1,000 mg     D3 5000 125 MCG (5000 UT) Caps  Generic drug: cholecalciferol   Take 5,000 Units by mouth every day.  Dose: 5,000 Units     irbesartan 300 MG Tabs  Commonly known as: Avapro   Take 300 mg by mouth every day.  Dose: 300 mg     metFORMIN 500 MG Tabs  Commonly known as: Glucophage   Take 500 mg by mouth every day.  Dose: 500 mg     rosuvastatin 10 MG Tabs  Commonly known as: Crestor   Take 10 mg by mouth every day.  Dose: 10 mg              Allergies  No Known Allergies    DIET  Orders Placed This Encounter   Procedures    Diet Order Diet: Full Liquid     Standing Status:   Standing     Number of Occurrences:   1     Order Specific Question:   Diet:     Answer:    Full Liquid [11]    Diet NPO Restrict to: Sips with Medications     Standing Status:   Standing     Number of Occurrences:   8     Order Specific Question:   Diet NPO Restrict to:     Answer:   Sips with Medications [3]       ACTIVITY  As per PT/OT evaluation.    CONSULTATIONS  IR    PROCEDURES      DX-CHEST-PORTABLE (1 VIEW)   Final Result         1. No acute cardiopulmonary abnormalities are identified.      CT-PELVIS W/O PLUS RECONS   Final Result      No acute fracture detected.      CT-LSPINE W/O PLUS RECONS   Final Result      1.  Acute L2 superior endplate compression fracture.   2.  Additional findings as above.      IR-CONSULT AND TREAT    (Results Pending)        LABORATORY  Lab Results   Component Value Date    SODIUM 135 02/27/2024    POTASSIUM 4.1 02/27/2024    CHLORIDE 98 02/27/2024    CO2 27 02/27/2024    GLUCOSE 124 (H) 02/27/2024    BUN 21 02/27/2024    CREATININE 0.36 (L) 02/27/2024        Lab Results   Component Value Date    WBC 15.4 (H) 02/27/2024    HEMOGLOBIN 12.1 02/27/2024    HEMATOCRIT 35.3 (L) 02/27/2024    PLATELETCT 361 02/27/2024    .

## 2024-02-27 NOTE — PROGRESS NOTES
Report received from Janina TOBIAS, assumed care of pt. At 1915  POC and medications reviewed with pt. Pt verbalized understanding.   Aox4   Full liquid diet  Denies SOB, or dizziness at this time.   Safety measures in place.  Hourly rounding in place.

## 2024-02-27 NOTE — H&P
"Hospital Medicine History & Physical Note    Date of Service  2/27/2024    Primary Care Physician  Pcp Pt States None    Consultants  Interventional Radiology    Specialist Names: Elvin Castillo MD    Code Status  DNAR/DNI    Chief Complaint  Back Pain    History of Presenting Illness  As per chart review:  \"89 y.o. female who presented 2/25/2024 with severe back pain that has progressively gotten worse since her fall on Thursday.  Patient recently moved from [facility] to [place] and went right into assisted living.  She has been in assisted living for the past 5 days.  On Thursday she suffered a fall and landed on her back and since then she has been progressively getting worse and worse on her pain.  As her pain has elevated she has become increasingly more sedentary and is unable to walk at this point.  Patient does came in with her daughter to the emergency room.  She was evaluated and found to have an L2 compression fracture. She has had 2 kyphoplasty's previously. The patient also was found to have a white blood cell count of 16,600 and she tells me that she has been having suprapubic tenderness, frequency of urination and also some dysuria. Patient at this point will be evaluated urine analysis.\"     Patient remained hospitalized.  Patient continues with significant back pain.  I discussed case with interventional radiology with Dr. Castillo who recommended kyphoplasty.  Unfortunately we do not have kyphoplasty capabilities at [facility] so the patient will be transferred to [facility] for further management and care.  The patient has been started on ceftriaxone still pending urine and blood cultures.     The patient seen at bedside this morning.  The patient laying in bed, in seems to be in distress due to acute pain.  I was able to talk to the patient and the patient's daughter about transferring the patient to [facility] and they are agreeable.      I discussed the plan of care with patient, family, " bedside RN, charge RN, , pharmacy, and IR .    Review of Systems  Constitutional:  Positive for malaise/fatigue. Negative for chills and fever.   HENT:  Negative for hearing loss and nosebleeds.    Eyes:  Negative for blurred vision and double vision.   Respiratory:  Negative for cough and shortness of breath.    Cardiovascular:  Negative for chest pain and palpitations.   Gastrointestinal:  Negative for abdominal pain, heartburn, nausea and vomiting.   Genitourinary:  Positive for dysuria and frequency.   Musculoskeletal:  Positive for back pain and falls.   Skin:  Negative for itching and rash.   Neurological:  Positive for weakness (generalized). Negative for dizziness and headaches.        Falls   Psychiatric/Behavioral:  Negative for substance abuse. The patient is not nervous/anxious.    All other systems reviewed and are negative.    Past Medical History   has a past medical history of DMII (diabetes mellitus, type 2) (HCC), High cholesterol, and HTN (hypertension).    Surgical History  Previous kyphoplasty    Family History  Non contributory as per patient    Social History   reports that she has never smoked. She has never used smokeless tobacco. She reports that she does not currently use alcohol. She reports that she does not use drugs.    Allergies  No Known Allergies    Medications  Amlodipine  Ceftriaxone  Gabapentin  Insulin sliding scale  Irbesartan  Lidocaine patch  Methocarbamol  Rosuvastatin  Vitamin D3  Oxycodone      Physical Exam  Temp:  [35.9 °C (96.6 °F)-36.7 °C (98 °F)] 35.9 °C (96.6 °F)  Pulse:  [70-82] 77  Resp:  [18] 18  BP: (125-136)/(50-66) 131/53  SpO2:  [93 %-95 %] 95 %      Constitutional:       General: She is in acute distress (due to pain).      Appearance: She is normal weight. She is ill-appearing.   HENT:      Head: Normocephalic and atraumatic.      Nose: Nose normal.      Mouth/Throat:      Mouth: Mucous membranes are moist.   Eyes:      General:         Right  "eye: No discharge.         Left eye: No discharge.      Extraocular Movements: Extraocular movements intact.      Pupils: Pupils are equal, round, and reactive to light.   Cardiovascular:      Rate and Rhythm: Normal rate and regular rhythm.   Pulmonary:      Effort: Pulmonary effort is normal.      Breath sounds: Normal breath sounds.   Abdominal:      General: Bowel sounds are normal. There is no distension.      Palpations: Abdomen is soft.      Tenderness: There is no abdominal tenderness.   Musculoskeletal:         General: Tenderness present.      Comments: Patient lying in back, barely moving due to significant pain   Skin:     General: Skin is warm and dry.   Neurological:      General: No focal deficit present.      Mental Status: She is oriented to person, place, and time. Mental status is at baseline.   Psychiatric:         Mood and Affect: Mood normal.         Behavior: Behavior normal.       Laboratory:  Recent Labs     02/25/24  1607 02/26/24  0153 02/27/24  0505   WBC 16.6* 15.1* 15.4*   RBC 4.43 3.94* 3.76*   HEMOGLOBIN 14.3 12.7 12.1   HEMATOCRIT 41.3 37.0 35.3*   MCV 93.2 93.9 93.9   MCH 32.3 32.2 32.2   MCHC 34.6 34.3 34.3   RDW 43.5 43.8 44.2   PLATELETCT 481* 395 361   MPV 8.0* 7.7* 7.7*     Recent Labs     02/25/24  1607 02/26/24  0153 02/27/24  0505   SODIUM 133* 136 135   POTASSIUM 3.9 3.5* 4.1   CHLORIDE 96 101 98   CO2 21 21 27   GLUCOSE 108* 87 124*   BUN 19 17 21   CREATININE 0.37* 0.30* 0.36*   CALCIUM 8.7 8.1* 8.5     Recent Labs     02/25/24  1607 02/26/24  0153 02/27/24  0505   ALTSGPT 8  --  7   ASTSGOT 13  --  11*   ALKPHOSPHAT 143*  --  124*   TBILIRUBIN 0.7  --  0.5   GAMMAGT 19  --   --    GLUCOSE 108* 87 124*     Recent Labs     02/25/24  1607   INR 1.05     No results for input(s): \"NTPROBNP\" in the last 72 hours.      No results for input(s): \"TROPONINT\" in the last 72 hours.    Imaging:  DX-CHEST-PORTABLE (1 VIEW)   Final Result           1. No acute cardiopulmonary " abnormalities are identified.       CT-PELVIS W/O PLUS RECONS   Final Result       No acute fracture detected.       CT-LSPINE W/O PLUS RECONS   Final Result       1.  Acute L2 superior endplate compression fracture.   2.  Additional findings as above.           Assessment/Plan:  * Closed compression fracture of L2 lumbar vertebra, initial encounter (HCC)- (present on admission)  Assessment & Plan  Patient fell at assisted living on Thursday  She just moved into assisted living 5 days ago from [facility].  The patient's fall resulted initially in minimal pain but over time the patient's pain became excruciating.  Patient is now found to have a L2 compression fracture which is acute.  Initiate pain management, muscle relaxant, Lidoderm patch ,PT OT -Will likely need to go to SNF as she has been falling.  Can get kyphoplasty outpatient at [facility]-aspirin has been held but per radiology there is no timeframe that would prevent her from having the procedure as soon as it could be scheduled.     2/27: I discussed with Dr Castillo from  who recommends kyphoplasty, unfortunately it cannot be done at [facility] and the patient will be transferred to [facility].     UTI (urinary tract infection)  Assessment & Plan  We have started ceftriaxone  Pending cultures     Dyslipidemia- (present on admission)  Assessment & Plan  Low-fat low-cholesterol diet  Continue with statin, Crestor 10 mg nightly     Diabetes mellitus type II, controlled ([facility])- (present on admission)  Assessment & Plan  Accu-Cheks with sliding scale coverage  Diabetic management  Continue metformin  Accu-Cheks with sliding scale coverage     2/27: Pending A1c           Hypertension- (present on admission)  Assessment & Plan  Optimize blood pressure management keep systolic blood pressure less than 140 diastolic under 90  Continue with Avapro 300 mg daily and Norvasc 5 mg daily  As needed labetalol     Thrombocytosis- (present on  admission)  Assessment & Plan  Thrombocytosis of 481,000 on 2/25     --resolved     Elevated alkaline phosphatase level- (present on admission)  Assessment & Plan  Check a GGT level  Most likely from bone injury with her recurrent falls.  Patient apparently fell 2 weeks ago as well.     2/27: GGT within normal limits.  I suspect elevated alkaline phosphatase due to bone.  It seems to be decreasing.  Repeat CMP.     Hyponatremia- (present on admission)  Assessment & Plan  Mild hyponatremia on 2/25     --resolved     Leukocytosis- (present on admission)  Assessment & Plan  Next leukocytosis of 16,600  This is concerning for acute infection  Patient complains of dysuria and frequency of urination. straight cath urinalysis and culture have been ordered     2/27: We have started patient on ceftriaxone.     Advance care planning  Assessment & Plan  I discussed with patient for at least 16 minutes further goals of care.  This included CODE STATUS which included DNR/DNI and full code.  The patient would like to be DNR/DNI.  However we also discussed further treatment options.  At this time the patient would like to continue with full treatment options including invasive and noninvasive procedures.  The patient will be transferred to [facility] for kyphoplasty.        VTE prophylaxis: SCDs/TEDs      I spend at least 76 minutes providing care for this patient.  This included face-to-face interview, physical examination.  Discussion with daughter over the phone.  Review of lab work including CBC, CMP.  Review of imaging study including CT scan.  Consulting and discussing with interventional radiology.  Discussing with multidisciplinary team including case management, nursing staff and pharmacy.  Creating plan of care, reviewing orders.    Moreover, I spend at least 16 minutes discussing further goals of care.  This included CODE STATUS including DNR/DNI and full code.  The patient would like to be DNR/DNI.  However we also  discussed treatment options.  And at this point the patient would like to continue with full treatment options including invasive and noninvasive procedures.  This is why the patient will be transferred to [facility] for kyphoplasty.

## 2024-02-27 NOTE — CARE PLAN
The patient is Stable - Low risk of patient condition declining or worsening    Shift Goals  Clinical Goals: Patient will report pain level 3/10 or less throughout the shift  Patient Goals: pain mngt    Progress made toward(s) clinical / shift goals:  Patient reports pain to mid back, received scheduled medication, patient refused to be reposition or turn due to pain/ discomfort    Patient is not progressing towards the following goals:    Problem: Pain - Standard  Goal: Alleviation of pain or a reduction in pain to the patient’s comfort goal  2/27/2024 1407 by Max Alegria R.N.  Outcome: Progressing       Problem: Knowledge Deficit - Standard  Goal: Patient and family/care givers will demonstrate understanding of plan of care, disease process/condition, diagnostic tests and medications  2/27/2024 1407 by Max Alegria R.N.  Outcome: Progressing       Problem: Skin Integrity  Goal: Skin integrity is maintained or improved  2/27/2024 1407 by Max Alegria, R.N.  Outcome: Progressing       Problem: Fall Risk  Goal: Patient will remain free from falls  2/27/2024 1407 by Max Alegria R.N.  Outcome: Progressing       Problem: Respiratory  Goal: Patient will achieve/maintain optimum respiratory ventilation and gas exchange  Outcome: Progressing     Problem: Fluid Volume  Goal: Fluid volume balance will be maintained  Outcome: Progressing

## 2024-02-27 NOTE — PROGRESS NOTES
"Hospital Medicine Daily Progress Note    Date of Service  2/27/2024    Chief Complaint  Back pain.    Hospital Course  As per chart review:  \"Mariana Jett is a 89 y.o. female admitted 2/25/2024 with intractable low back pain after fall at assisted living.  Patient had had previous compression fractures and kyphoplasty she recently moved here from [facility] and despite using assistive devices she was continuing to fall while at assisted living, she fell the other day and had worsening low back pain and now can barely move or ambulate.\"    Interval Problem Update  2/27: Patient seen at bedside this morning.  Patient complaining of significant back pain, I discussed case with interventional radiology, with Dr. Castillo who recommended kyphoplasty.  Unfortunately they do not perform kyphoplasty at [facility] and the patient will be transferred to [facility] for further management and care.  As per PT/OT the patient might require placement after kyphoplasty.  I have spoken with the patient's daughter over the phone to give her an update.  The patient understands as well and is agreeable to the transfer procedure.  We have started the patient on ceftriaxone as well for UTI.    I have discussed this patient's plan of care and discharge plan at IDT rounds today with Case Management, Nursing, Nursing leadership, and other members of the IDT team.    Consultants/Specialty  IR    Code Status  DNAR/DNI    Disposition  The patient is not medically cleared for discharge to home or a post-acute facility.      I have placed the appropriate orders for post-discharge needs.    Review of Systems  Review of Systems   Constitutional:  Positive for malaise/fatigue. Negative for chills and fever.   HENT:  Negative for hearing loss and nosebleeds.    Eyes:  Negative for blurred vision and double vision.   Respiratory:  Negative for cough and shortness of breath.    Cardiovascular:  Negative for chest pain and palpitations. "   Gastrointestinal:  Negative for abdominal pain, heartburn, nausea and vomiting.   Genitourinary:  Positive for dysuria and frequency.   Musculoskeletal:  Positive for back pain and falls.   Skin:  Negative for itching and rash.   Neurological:  Positive for weakness (generalized). Negative for dizziness and headaches.        Falls   Psychiatric/Behavioral:  Negative for substance abuse. The patient is not nervous/anxious.    All other systems reviewed and are negative.       Physical Exam  Temp:  [35.9 °C (96.6 °F)-36.7 °C (98 °F)] 35.9 °C (96.6 °F)  Pulse:  [70-82] 77  Resp:  [18] 18  BP: (125-136)/(50-66) 131/53  SpO2:  [93 %-95 %] 95 %    Physical Exam  Vitals and nursing note reviewed.   Constitutional:       General: She is in acute distress (due to pain).      Appearance: She is normal weight. She is ill-appearing.   HENT:      Head: Normocephalic and atraumatic.      Nose: Nose normal.      Mouth/Throat:      Mouth: Mucous membranes are moist.   Eyes:      General:         Right eye: No discharge.         Left eye: No discharge.      Extraocular Movements: Extraocular movements intact.      Pupils: Pupils are equal, round, and reactive to light.   Cardiovascular:      Rate and Rhythm: Normal rate and regular rhythm.   Pulmonary:      Effort: Pulmonary effort is normal.      Breath sounds: Normal breath sounds.   Abdominal:      General: Bowel sounds are normal. There is no distension.      Palpations: Abdomen is soft.      Tenderness: There is no abdominal tenderness.   Musculoskeletal:         General: Tenderness present.      Comments: Patient lying in back, barely moving due to significant pain   Skin:     General: Skin is warm and dry.   Neurological:      General: No focal deficit present.      Mental Status: She is oriented to person, place, and time. Mental status is at baseline.   Psychiatric:         Mood and Affect: Mood normal.         Behavior: Behavior normal.         Fluids    Intake/Output  Summary (Last 24 hours) at 2/27/2024 1134  Last data filed at 2/27/2024 0900  Gross per 24 hour   Intake 420 ml   Output 200 ml   Net 220 ml       Laboratory  Recent Labs     02/25/24  1607 02/26/24  0153 02/27/24  0505   WBC 16.6* 15.1* 15.4*   RBC 4.43 3.94* 3.76*   HEMOGLOBIN 14.3 12.7 12.1   HEMATOCRIT 41.3 37.0 35.3*   MCV 93.2 93.9 93.9   MCH 32.3 32.2 32.2   MCHC 34.6 34.3 34.3   RDW 43.5 43.8 44.2   PLATELETCT 481* 395 361   MPV 8.0* 7.7* 7.7*     Recent Labs     02/25/24  1607 02/26/24  0153 02/27/24  0505   SODIUM 133* 136 135   POTASSIUM 3.9 3.5* 4.1   CHLORIDE 96 101 98   CO2 21 21 27   GLUCOSE 108* 87 124*   BUN 19 17 21   CREATININE 0.37* 0.30* 0.36*   CALCIUM 8.7 8.1* 8.5     Recent Labs     02/25/24  1607   INR 1.05               Imaging  DX-CHEST-PORTABLE (1 VIEW)   Final Result         1. No acute cardiopulmonary abnormalities are identified.      CT-PELVIS W/O PLUS RECONS   Final Result      No acute fracture detected.      CT-LSPINE W/O PLUS RECONS   Final Result      1.  Acute L2 superior endplate compression fracture.   2.  Additional findings as above.      IR-CONSULT AND TREAT    (Results Pending)        Assessment/Plan  * Closed compression fracture of L2 lumbar vertebra, initial encounter ([facility])- (present on admission)  Assessment & Plan  Patient fell at assisted living on Thursday  She just moved into assisted living 5 days ago from [facility].  The patient's fall resulted initially in minimal pain but over time the patient's pain became excruciating.  Patient is now found to have a L2 compression fracture which is acute.  Initiate pain management, muscle relaxant, Lidoderm patch ,PT OT -Will likely need to go to SNF as she has been falling.  Can get kyphoplasty outpatient at [facility]-aspirin has been held but per radiology there is no timeframe that would prevent her from having the procedure as soon as it could be scheduled.    2/27: I discussed with Dr Castillo from IR who  recommends kyphoplasty, unfortunately it cannot be done at [facility] and the patient will be transferred to [facility].    UTI (urinary tract infection)  Assessment & Plan  We have started ceftriaxone  Pending cultures    Dyslipidemia- (present on admission)  Assessment & Plan  Low-fat low-cholesterol diet  Continue with statin, Crestor 10 mg nightly    Diabetes mellitus type II, controlled (HCC)- (present on admission)  Assessment & Plan  Accu-Cheks with sliding scale coverage  Diabetic management  Continue metformin  Accu-Cheks with sliding scale coverage    2/27: Pending A1c        Hypertension- (present on admission)  Assessment & Plan  Optimize blood pressure management keep systolic blood pressure less than 140 diastolic under 90  Continue with Avapro 300 mg daily and Norvasc 5 mg daily  As needed labetalol    Thrombocytosis- (present on admission)  Assessment & Plan  Thrombocytosis of 481,000 on 2/25    --resolved    Elevated alkaline phosphatase level- (present on admission)  Assessment & Plan  Check a GGT level  Most likely from bone injury with her recurrent falls.  Patient apparently fell 2 weeks ago as well.    2/27: GGT within normal limits.  I suspect elevated alkaline phosphatase due to bone.  It seems to be decreasing.  Repeat CMP.    Hyponatremia- (present on admission)  Assessment & Plan  Mild hyponatremia on 2/25    --resolved    Leukocytosis- (present on admission)  Assessment & Plan  Next leukocytosis of 16,600  This is concerning for acute infection  Patient complains of dysuria and frequency of urination. straight cath urinalysis and culture have been ordered    2/27: We have started patient on ceftriaxone.    Advance care planning  Assessment & Plan  I discussed with patient for at least 16 minutes further goals of care.  This included CODE STATUS which included DNR/DNI and full code.  The patient would like to be DNR/DNI.  However we also discussed further treatment options.  At this  time the patient would like to continue with full treatment options including invasive and noninvasive procedures.  The patient will be transferred to [facility] for kyphoplasty.         VTE prophylaxis:   SCDs/TEDs      I have performed a physical exam and reviewed and updated ROS and Plan today (2/27/2024). In review of yesterday's note (2/26/2024), there are no changes except as documented above.

## 2024-02-27 NOTE — CARE PLAN
The patient is Stable - Low risk of patient condition declining or worsening    Shift Goals  Clinical Goals: Pt pain will be 4/10 pain or less after intervention throughout shift.  Patient Goals: sleep comfortably    Progress made toward(s) clinical / shift goals:  Pt remained free from falls with fall precautions in place, Pain  was 3/10 after interventions.    Patient is not progressing towards the following goals:

## 2024-02-27 NOTE — DISCHARGE PLANNING
Case Management Discharge Planning    Admission Date: 2/25/2024  GMLOS:    ALOS: 0    6-Clicks ADL Score: 10  6-Clicks Mobility Score: 10  PT and/or OT Eval ordered: Yes  Post-acute Referrals Ordered: Yes  Post-acute Choice Obtained: No  Has referral(s) been sent to post-acute provider:  No      Anticipated Discharge Dispo: Discharge Disposition: Disch to  rehab facility or distinct part unit (62)    DME Needed: No    Action(s) Taken: Updated Provider/Nurse on Discharge Plan    Pt discussed in 1030 rounds. Pt will be transferred to [facility] for further treatment. PCS completed and faxed to [facility].     1789-  Pt's daughter provided pt's N - .   Pending transport time for transfer.     Escalations Completed: None    Medically Clear: No    Next Steps: LSW to follow    Barriers to Discharge: Medical clearance    Is the patient up for discharge tomorrow: No

## 2024-02-27 NOTE — PROGRESS NOTES
Received patient from Night shift RN . Patient is awake and alert.On 1.5 Liters via NC. Patient reports pain to her mid back, medicated as per MAR.Fall precautions observe, kept bed in lowest position,bed alarm on  and call light within reach.    1635- report given to JATINDER Hernandez.  Updated patient , SHELBIE will pick him up at 1710

## 2024-02-27 NOTE — ASSESSMENT & PLAN NOTE
I discussed with patient for at least 16 minutes further goals of care.  This included CODE STATUS which included DNR/DNI and full code.  The patient would like to be DNR/DNI.  However we also discussed further treatment options.  At this time the patient would like to continue with full treatment options including invasive and noninvasive procedures.  The patient will be transferred to [facility] for kyphoplasty.

## 2024-02-28 ENCOUNTER — APPOINTMENT (OUTPATIENT)
Dept: RADIOLOGY | Facility: MEDICAL CENTER | Age: 89
DRG: 516 | End: 2024-02-28
Attending: NURSE PRACTITIONER
Payer: MEDICARE

## 2024-02-28 LAB
ALBUMIN SERPL BCP-MCNC: 3.3 G/DL (ref 3.2–4.9)
ALBUMIN/GLOB SERPL: 1.3 G/DL
ALP SERPL-CCNC: 124 U/L (ref 30–99)
ALT SERPL-CCNC: 13 U/L (ref 2–50)
ANION GAP SERPL CALC-SCNC: 11 MMOL/L (ref 7–16)
AST SERPL-CCNC: 18 U/L (ref 12–45)
BILIRUB SERPL-MCNC: 0.7 MG/DL (ref 0.1–1.5)
BUN SERPL-MCNC: 15 MG/DL (ref 8–22)
CALCIUM ALBUM COR SERPL-MCNC: 9 MG/DL (ref 8.5–10.5)
CALCIUM SERPL-MCNC: 8.4 MG/DL (ref 8.5–10.5)
CHLORIDE SERPL-SCNC: 97 MMOL/L (ref 96–112)
CO2 SERPL-SCNC: 24 MMOL/L (ref 20–33)
CREAT SERPL-MCNC: 0.21 MG/DL (ref 0.5–1.4)
ERYTHROCYTE [DISTWIDTH] IN BLOOD BY AUTOMATED COUNT: 44.9 FL (ref 35.9–50)
GFR SERPLBLD CREATININE-BSD FMLA CKD-EPI: 110 ML/MIN/1.73 M 2
GLOBULIN SER CALC-MCNC: 2.6 G/DL (ref 1.9–3.5)
GLUCOSE BLD STRIP.AUTO-MCNC: 115 MG/DL (ref 65–99)
GLUCOSE BLD STRIP.AUTO-MCNC: 128 MG/DL (ref 65–99)
GLUCOSE BLD STRIP.AUTO-MCNC: 145 MG/DL (ref 65–99)
GLUCOSE BLD STRIP.AUTO-MCNC: 213 MG/DL (ref 65–99)
GLUCOSE SERPL-MCNC: 134 MG/DL (ref 65–99)
HCT VFR BLD AUTO: 37.7 % (ref 37–47)
HGB BLD-MCNC: 12.9 G/DL (ref 12–16)
MAGNESIUM SERPL-MCNC: 1.8 MG/DL (ref 1.5–2.5)
MCH RBC QN AUTO: 32.1 PG (ref 27–33)
MCHC RBC AUTO-ENTMCNC: 34.2 G/DL (ref 32.2–35.5)
MCV RBC AUTO: 93.8 FL (ref 81.4–97.8)
PLATELET # BLD AUTO: 329 K/UL (ref 164–446)
PMV BLD AUTO: 7.9 FL (ref 9–12.9)
POTASSIUM SERPL-SCNC: 4.4 MMOL/L (ref 3.6–5.5)
PROT SERPL-MCNC: 5.9 G/DL (ref 6–8.2)
RBC # BLD AUTO: 4.02 M/UL (ref 4.2–5.4)
SODIUM SERPL-SCNC: 132 MMOL/L (ref 135–145)
WBC # BLD AUTO: 14.9 K/UL (ref 4.8–10.8)

## 2024-02-28 PROCEDURE — 96374 THER/PROPH/DIAG INJ IV PUSH: CPT

## 2024-02-28 PROCEDURE — 36415 COLL VENOUS BLD VENIPUNCTURE: CPT

## 2024-02-28 PROCEDURE — 83735 ASSAY OF MAGNESIUM: CPT

## 2024-02-28 PROCEDURE — G0378 HOSPITAL OBSERVATION PER HR: HCPCS

## 2024-02-28 PROCEDURE — 72148 MRI LUMBAR SPINE W/O DYE: CPT

## 2024-02-28 PROCEDURE — 96372 THER/PROPH/DIAG INJ SC/IM: CPT

## 2024-02-28 PROCEDURE — 99233 SBSQ HOSP IP/OBS HIGH 50: CPT | Performed by: STUDENT IN AN ORGANIZED HEALTH CARE EDUCATION/TRAINING PROGRAM

## 2024-02-28 PROCEDURE — 700101 HCHG RX REV CODE 250: Performed by: INTERNAL MEDICINE

## 2024-02-28 PROCEDURE — 700102 HCHG RX REV CODE 250 W/ 637 OVERRIDE(OP): Performed by: INTERNAL MEDICINE

## 2024-02-28 PROCEDURE — 82962 GLUCOSE BLOOD TEST: CPT

## 2024-02-28 PROCEDURE — 700111 HCHG RX REV CODE 636 W/ 250 OVERRIDE (IP): Performed by: INTERNAL MEDICINE

## 2024-02-28 PROCEDURE — A9270 NON-COVERED ITEM OR SERVICE: HCPCS | Performed by: INTERNAL MEDICINE

## 2024-02-28 PROCEDURE — 80053 COMPREHEN METABOLIC PANEL: CPT

## 2024-02-28 PROCEDURE — 85027 COMPLETE CBC AUTOMATED: CPT

## 2024-02-28 RX ADMIN — GABAPENTIN 100 MG: 100 CAPSULE ORAL at 16:59

## 2024-02-28 RX ADMIN — POLYETHYLENE GLYCOL 3350 1 PACKET: 17 POWDER, FOR SOLUTION ORAL at 05:14

## 2024-02-28 RX ADMIN — GABAPENTIN 100 MG: 100 CAPSULE ORAL at 05:16

## 2024-02-28 RX ADMIN — METHOCARBAMOL 500 MG: 500 TABLET ORAL at 08:17

## 2024-02-28 RX ADMIN — METHOCARBAMOL 500 MG: 500 TABLET ORAL at 12:05

## 2024-02-28 RX ADMIN — IRBESARTAN 300 MG: 150 TABLET ORAL at 05:18

## 2024-02-28 RX ADMIN — INSULIN HUMAN 2 UNITS: 100 INJECTION, SOLUTION PARENTERAL at 17:05

## 2024-02-28 RX ADMIN — OXYCODONE 5 MG: 5 TABLET ORAL at 23:18

## 2024-02-28 RX ADMIN — CEFTRIAXONE SODIUM 1000 MG: 10 INJECTION, POWDER, FOR SOLUTION INTRAVENOUS at 05:15

## 2024-02-28 RX ADMIN — AMLODIPINE BESYLATE 5 MG: 5 TABLET ORAL at 05:16

## 2024-02-28 RX ADMIN — ROSUVASTATIN 10 MG: 10 TABLET, FILM COATED ORAL at 05:16

## 2024-02-28 RX ADMIN — METHOCARBAMOL 500 MG: 500 TABLET ORAL at 21:18

## 2024-02-28 RX ADMIN — METHOCARBAMOL 500 MG: 500 TABLET ORAL at 16:59

## 2024-02-28 RX ADMIN — GABAPENTIN 100 MG: 100 CAPSULE ORAL at 12:05

## 2024-02-28 RX ADMIN — OXYCODONE 5 MG: 5 TABLET ORAL at 19:41

## 2024-02-28 RX ADMIN — LIDOCAINE 1 PATCH: 4 PATCH TOPICAL at 05:14

## 2024-02-28 RX ADMIN — Medication 1000 UNITS: at 05:16

## 2024-02-28 ASSESSMENT — ENCOUNTER SYMPTOMS
NAUSEA: 0
SPUTUM PRODUCTION: 0
BACK PAIN: 1
FALLS: 1
WEAKNESS: 1
FEVER: 0
COUGH: 0
WEAKNESS: 0
ABDOMINAL PAIN: 0
MYALGIAS: 1
HEADACHES: 0
SHORTNESS OF BREATH: 0
VOMITING: 0
CHILLS: 0

## 2024-02-28 ASSESSMENT — PAIN DESCRIPTION - PAIN TYPE
TYPE: ACUTE PAIN
TYPE: ACUTE PAIN

## 2024-02-28 NOTE — THERAPY
Physical Therapy Contact Note    Patient Name: Mariana Jett  Age:  89 y.o., Sex:  female  Medical Record #: [id number]  Today's Date: 2/28/2024    PT consult received, pt is pending kyphoplasty today. Will f/u post-procedure as able/appropriate.

## 2024-02-28 NOTE — PROGRESS NOTES
Pt. Was transported to her room via [facility] by SHELBIE. Called  [id number] for consent  and admission paper works.       [facility] notified and have admitting MD paged.     8:58PM 2/27/24: Consent for admission was signed by patient, voluntarily.

## 2024-02-28 NOTE — DISCHARGE PLANNING
RN ALEN met with patient at bedside to complete admission assessment. Patient pleasantly A&Ox4 and able to verify information on face sheet. Patient lives in [facility] facility ([facility] @ [address].   She just moved to [place] from [place].   She does not drive.   Her adult daughter, Adriana, lives in [place] and is a support for her. Adriana does not currently work.   Patient does not yet have a PCP in [place] and is requesting to be set up with an appointment prior to discharge. RN ALEN will assist with that.   Patient denies history of mental health or substance abuse concerns.   She owns a FWW, 4WW, cane, and wheelchair. She reports mainly using the 4WW. She does not use oxygen at baseline.   JATINDER LOWRY will remain available for discharge needs.   Pending procedures, medical clearance and PT/OT evaluation.     Case Management Discharge Planning    Admission Date: 2/27/2024  GMLOS:    ALOS: 0    6-Clicks ADL Score: 17  6-Clicks Mobility Score: 12  PT and/or OT Eval ordered: Yes  Post-acute Referrals Ordered: NA  Post-acute Choice Obtained: NA  Has referral(s) been sent to post-acute provider:  DARON      Anticipated Discharge Dispo: Discharge Disposition: D/T to home under A care in anticipation of covered skilled care (06)  Discharge Address: [facility] [address]  Discharge Contact Phone Number: [phone]    DME Needed: No    Action(s) Taken: Updated Provider/Nurse on Discharge Plan, Patient Conference, and DC Assessment Complete (See below)    Escalations Completed: None    Medically Clear: No    Next Steps: Pending procedures, medical clearance and therapy recommendations. RN CM to remain available to send appropriate referrals    Barriers to Discharge: Medical clearance, Pending PT Evaluation, and Pending Procedures    Is the patient up for discharge tomorrow: No    Care Transition Team Assessment    Information Source  Orientation Level: Oriented X4  Information Given By: Patient  Informant's  Name: Mariana  Who is responsible for making decisions for patient? : Patient    Readmission Evaluation  Is this a readmission?: No    Elopement Risk  Legal Hold: No  Ambulatory or Self Mobile in Wheelchair: No-Not an Elopement Risk  History of Wandering: No  Elopement this Admit: No  Vocalizing Wanting to Leave: No    Interdisciplinary Discharge Planning  Does Admitting Nurse Feel This Could be a Complex Discharge?: No  Primary Care Physician: None, Requested help getting set up prior to discharging.  Lives with - Patient's Self Care Capacity: Adult Children  Patient or legal guardian wants to designate a caregiver: Yes  Caregiver name: deni  ([facility]) Authorization for Release of Health Information has been completed: Yes  Support Systems: Children  Housing / Facility: Assisted Living Residence  Durable Medical Equipment: Not Applicable    Discharge Preparedness  What is your plan after discharge?: Uncertain - pending medical team collaboration  What are your discharge supports?: Child, Other (comment) ([facility] staff)  Prior Functional Level: Ambulatory, Needs Assist with Activities of Daily Living, Independent with Medication Management, Uses Walker  Difficulity with ADLs: Bathing  Difficulty with ADLs Comment: [facility] staff for bathing  Difficulity with IADLs: Driving, Laundry, Shopping, Managing medication  Difficulity with IADL Comments: [facility] staff assists with IADLs    Functional Assesment  Prior Functional Level: Ambulatory, Needs Assist with Activities of Daily Living, Independent with Medication Management, Uses Walker    Finances  Financial Barriers to Discharge: No  Prescription Coverage: Yes    Vision / Hearing Impairment  Vision Impairment : Yes  Right Eye Vision: Wears Glasses, Impaired  Left Eye Vision: Impaired, Wears Glasses  Hearing Impairment : No    Values / Beliefs / Concerns  Values / Beliefs Concerns : No    Advance Directive  Advance Directive?: None  Advance Directive offered?: AD Booklet  refused    Domestic Abuse  Have you ever been the victim of abuse or violence?: No  Physical Abuse or Sexual Abuse: No  Verbal Abuse or Emotional Abuse: No  Possible Abuse/Neglect Reported to:: Not Applicable    Psychological Assessment  History of Substance Abuse: None  History of Psychiatric Problems: No  Non-compliant with Treatment: No  Newly Diagnosed Illness: Yes    Discharge Risks or Barriers  Discharge risks or barriers?: Complex medical needs, No PCP  Patient risk factors: Complex medical needs, No PCP, Vulnerable adult    Anticipated Discharge Information  Discharge Disposition: D/T to home under [facility] care in anticipation of covered skilled care (06)  Discharge Address: [address] [place] [address], [address]  Discharge Contact Phone Number: [phone]

## 2024-02-28 NOTE — DISCHARGE PLANNING
Anticipated Discharge Disposition: [facility]    Action: Delay in EMS noted , voalte from RN .Note from [facility] noted. ER CM did voalte [facility] and [facility] on the way    Barriers to Discharge: [facility] in route    Plan: No further ER CM needs

## 2024-02-28 NOTE — DISCHARGE PLANNING
[facility] Transitional Care Coordination    Referral from: Dr Correa  Insurance Provider on Facesheet: Medicare  Potential Rehab Diagnosis: SCI/Ortho    Chart review indicates patient may have on going medical management and may have therapy needs to possibly meet inpatient rehab facility criteria with the goal of returning to community.    D/C support: Daughter - pt lives at [facility]     Physiatry consultation pended per protocol.     PMR recommended SNF 2/26, patient maxA x2. Plan for surgery tomorrow, will follow for post-op therapy evals as appropriate. Potential candidate for IPR pending progress with therapy.     Thank you for the referral.

## 2024-02-28 NOTE — THERAPY
Occupational Therapy Contact Note    Patient Name: Mariana Jett  Age:  89 y.o., Sex:  female  Medical Record #: [id number]  Today's Date: 2/28/2024 02/28/24 0728   Interdisciplinary Plan of Care Collaboration   Collaboration Comments OT orders received, pt pending kyphoplasty today. Will hold and round back after procedure as able.

## 2024-02-28 NOTE — PROGRESS NOTES
Bedside report recieved, assumed care at 0700.   Pt is A&Ox4, reports back pain only with movement. Denies numbness/tingling.   Denies N/V. BM PTA .Pt on 4 L O2-able to wean to 1 L via nc. NPO since midnight.     Plan of care discussed. All needs met at this time. Pt instructed to use call light when in need of assistance.  in use. Bed alarm on. Bed locked and in low position, call light and belongings within reach, upper rails up. Treaded socks on.

## 2024-02-28 NOTE — PROGRESS NOTES
4 Eyes Skin Assessment Completed by JATINDER Malone and JATINDER Ivy.    Head WDL  Ears WDL  Nose WDL  Mouth WDL  Neck WDL  Breast/Chest WDL  Shoulder Blades WDL  Spine WDL  (R) Arm/Elbow/Hand Blanching, Bruising, and Discoloration  (L) Arm/Elbow/Hand Redness, Blanching, Bruising, and Discoloration  Abdomen WDL  Groin WDL  Scrotum/Coccyx/Buttocks Redness, Blanching, and Discoloration, Anal fissure  (R) Leg Bruising  (L) Leg Bruising and Swelling  (R) Heel/Foot/Toe Redness and Boggy, slow to cole  (L) Heel/Foot/Toe Redness and Boggy, slow to cole          Devices In Places Pulse Ox and Nasal Cannula, female wick      Interventions In Place NC W/Ear Foams, Heel Mepilex, TAP System, Pillows, and Barrier Cream    Possible Skin Injury Yes    Pictures Uploaded Into Epic Yes  Wound Consult Placed Yes  RN Wound Prevention Protocol Ordered Yes

## 2024-02-28 NOTE — CONSULTS
Radiology Consult  Author: DIPAK Haro Date & Time created: 2/28/2024  9:01 AM   Date of admission  2/27/2024  Note to reader: this note follows the APSO format rather than the historical SOAP format. Assessment and plan located at the top of the note for ease of use.    Chief Complaint  89 y.o. female admitted 2/27/2024 with acute lower back pain, difficulty ambulating     HPI  Mariana Jett is a 89-year-old female with past medical history of DMII, hyperlipidemia, HTN, previous kyphoplasty, living at an assisted living center who presented to [facility] on 02/25/2024 complaining of severe back pain after a ground-level fall landing on her back on 02/22/2024.  In the ED a CT spine showed  superior L2 vertebral body fractures, several fracture lines, with mild height loss and minimal retropulsion of the posterior superior cortex of the L2 vertebral body by 2 mm.   Unfortunately [facility] does not have the resources to perform kyphoplasty, therefore she was transferred to [facility] on 02/27/2028 for interventional radiology consult, evaluation for possible kyphoplasty.   I have reviewed 's history and imaging studies with Dr. Sawyer. Ms. Jett is  a candidate for vertebral augmentation. The acute pain she describes appears to be directly related to the fracture. I  discussed the method of the procedure with her as well as the risks, including bleeding, infection, reaction to the moderate sedation medications, and cement extravasation causing compression of the spinal canal or embolus and subsequent interventions. We discussed alternatives of the procedure including conservative medical management. The patient verbalizes understanding and states that she has had two kyphoplasty procedures in the past. She agrees to Kyphoplasty and elects to proceed.        Assessment/Plan  Interval History   Principal Problem:    Closed compression fracture  of L2 lumbar vertebra, initial encounter ([facility])  Active Problems:    Leukocytosis    Hyponatremia    Elevated alkaline phosphatase level    Thrombocytosis    Hypertension    Diabetes mellitus type II, controlled ([facility])    Dyslipidemia    UTI (urinary tract infection)    Advance care planning    Lumbar compression fracture, closed, initial encounter ([facility])      Plan IR    - NPO at midnight tonight   - I have ordered an MRI of spine to be done today for OR tomorrow.   - Hold anticoagulants prior to procedure per guidelines  -post op care:  -PT/OT  -No strenuous activity including straining, bending, heavy lifting, or twisting for 1 to 2 weeks.  Do not lift anything greater than 7 pounds for 1 to 2 weeks.  No driving for 48 hours.  Okay to remove bandage 24 to 48 hours after procedure.  Do not submerge procedure site in water for 7 days (bathtubs, hot tubs, etc.).  Okay to shower 48 hours after procedure and pat area dry. Use ice pack in 20-minute increments as needed to reduce pain and swelling at procedure site.  -Follow-up outpatient with general practitioner or neurosurgeon     Thank you for allowing Interventional Radiology team to participate in the patients care, if any additonal care or requests are needed in the future please do not hesitate call or place IR order      [id number]  IR:            Review of Systems  Physical Exam   Review of Systems   Constitutional:  Negative for chills and fever.   Respiratory:  Negative for cough, sputum production and shortness of breath.    Cardiovascular:  Negative for chest pain.   Gastrointestinal:  Negative for abdominal pain, nausea and vomiting.   Musculoskeletal:  Positive for back pain and myalgias.        Lower mid back pain with movement.  Extreme pain with coughing   Neurological:  Negative for weakness and headaches.      Vitals:    02/28/24 0752   BP: 138/67   Pulse: 74   Resp: 17   Temp: 36.5 °C (97.7 °F)   SpO2: 98%      Physical Exam  Vitals and nursing note  reviewed.   HENT:      Head: Normocephalic and atraumatic.      Mouth/Throat:      Mouth: Mucous membranes are moist.      Pharynx: Oropharynx is clear.   Cardiovascular:      Rate and Rhythm: Normal rate and regular rhythm.      Pulses:           Radial pulses are 2+ on the right side and 2+ on the left side.        Dorsalis pedis pulses are 2+ on the right side and 2+ on the left side.   Pulmonary:      Effort: Pulmonary effort is normal.   Abdominal:      General: Abdomen is flat. There is no distension.      Palpations: Abdomen is soft.   Skin:     General: Skin is warm and dry.      Capillary Refill: Capillary refill takes less than 2 seconds.   Neurological:      Mental Status: She is alert and oriented to person, place, and time.      GCS: GCS eye subscore is 4. GCS verbal subscore is 5. GCS motor subscore is 6.      Comments: Moving all extrem spontaneously- antigravity  Sensation intact   Sharp lower mid back pain with coughing   Psychiatric:         Mood and Affect: Mood normal.         Speech: Speech normal.         Behavior: Behavior is cooperative.             Labs    Recent Labs     02/26/24  0153 02/27/24  0505 02/28/24  0452   WBC 15.1* 15.4* 14.9*   RBC 3.94* 3.76* 4.02*   HEMOGLOBIN 12.7 12.1 12.9   HEMATOCRIT 37.0 35.3* 37.7   MCV 93.9 93.9 93.8   MCH 32.2 32.2 32.1   MCHC 34.3 34.3 34.2   RDW 43.8 44.2 44.9   PLATELETCT 395 361 329   MPV 7.7* 7.7* 7.9*     Recent Labs     02/26/24  0153 02/27/24  0505 02/28/24  0452   SODIUM 136 135 132*   POTASSIUM 3.5* 4.1 4.4   CHLORIDE 101 98 97   CO2 21 27 24   GLUCOSE 87 124* 134*   BUN 17 21 15   CREATININE 0.30* 0.36* 0.21*   CALCIUM 8.1* 8.5 8.4*     MR-LUMBAR SPINE-W/O    (Results Pending)   IR-KYPHOPLASTY,1 VERTEBRA,LUMBAR    (Results Pending)     Recent Labs     02/26/24  0153 02/27/24  0505 02/28/24  0452   SODIUM 136 135 132*   POTASSIUM 3.5* 4.1 4.4   CHLORIDE 101 98 97   CO2 21 27 24   GLUCOSE 87 124* 134*   BUN 17 21 15     INR   Date Value Ref  "Range Status   02/25/2024 1.05 0.87 - 1.13 Final     Comment:     Reference range:  INR - Non-therapeutic Reference Range: 0.87-1.13  INR - Therapeutic Reference Range: 2.0-4.0       No results found for: \"POCINR\"     Intake/Output Summary (Last 24 hours) at 2/28/2024 0901  Last data filed at 2/28/2024 0000  Gross per 24 hour   Intake --   Output 50 ml   Net -50 ml      Labs not explicitly included in this progress note were reviewed by the author. Radiology/imaging not explicitly included in this progress note was reviewed by the author.     Past Medical History:   Diagnosis Date    DMII (diabetes mellitus, type 2) (HCC)     High cholesterol     HTN (hypertension)         Home Medications    Medication Sig Taking? Last Dose Authorizing Provider   irbesartan (AVAPRO) 300 MG Tab Take 300 mg by mouth every day. Yes 2/26/2024 at 1200 [facility]ician Outpatient   risedronate (ACTONEL) 35 MG Tab Take 35 mg by mouth every 7 days. On Monday Yes 2/26/2024 at 1200 [facility]ician Outpatient   Cranberry 1000 MG Cap Take 1,000 mg by mouth every day. Yes 2/26/2024 at 1200 [facility] Outpatient   aspirin 81 MG EC tablet Take 81 mg by mouth every day. Yes 2/26/2024 at 1200 [facility] Outpatient   cholecalciferol (D3 5000) 5000 UNIT Cap Take 5,000 Units by mouth every day. Yes 2/26/2024 at 1200 [facility]ician Outpatient   metFORMIN (GLUCOPHAGE) 500 MG Tab Take 500 mg by mouth every day. Yes 2/26/2024 at 1200 [facility] Outpatient   rosuvastatin (CRESTOR) 10 MG Tab Take 10 mg by mouth every day. Yes 2/26/2024 at 1200 [facility]ician Outpatient   amLODIPine (NORVASC) 5 MG Tab Take 5 mg by mouth every day. Yes 2/27/2024 at 1200 [facility]ician Outpatient   Acetaminophen 500 MG Cap Take 500-1,000 mg by mouth 2 times a day as needed. Indications: Pain   at K [facility]       I have performed a physical exam and reviewed and updated ROS and Plan today (2/28/2024).     40 minutes in directly providing and coordinating care and extensive data review.  No " time overlap and excludes procedures.    2/29/2024  MRI lumbar spine reviewed and shows an acute L2 fracture woth bone marrow edema. Plan t o proceed with cement augmentaion.   The indications, benefits, alternatives and risks to the procedure were discussed with the patient and  family. Risks discussed included but were not limited to cement leakage, neurological injury, bleeding, infection, stroke, injury to nearby structures, new temporary or permanent motor weakness, arterial or venous injury and rarely death. Plan to proceed with procedure under general anesthesia.      As the Staff Neurointerventional radiologist, I have reviewed the notes, discussed the findings and plan of management with the Nurse Practitioner and agree with the contents of the note.

## 2024-02-28 NOTE — CARE PLAN
Problem: Knowledge Deficit - Standard  Goal: Patient and family/care givers will demonstrate understanding of plan of care, disease process/condition, diagnostic tests and medications  Description: Target End Date:  1-3 days or as soon as patient condition allows    Document in Patient Education    1.  Patient and family/caregiver oriented to unit, equipment, visitation policy and means for communicating concern  2.  Complete/review Learning Assessment  3.  Assess knowledge level of disease process/condition, treatment plan, diagnostic tests and medications  4.  Explain disease process/condition, treatment plan, diagnostic tests and medications  Outcome: Met   The patient is Stable - Low risk of patient condition declining or worsening    Shift Goals  Clinical Goals: pain control, safe mobility, NPO sips with meds at MN, UA  Patient Goals: Pain control, res, comofrt  Family Goals: Update with POC    Progress made toward(s) clinical / shift goals:       Pt. Verbalized understanding on the care provided.   Wound consult was  ordered, pt. Heels were boggy, slow to cole, Anal fissure or hemorrhoid was noted on her anus.   Pt. Didn't fall under RN care, fall precautions in place.

## 2024-02-28 NOTE — PROGRESS NOTES
89-year-old female who was directly admitted by Dr. Gino Santiago from [facility] for kyphoplasty.  I released the orders and put n.p.o. at midnight order.

## 2024-02-29 ENCOUNTER — APPOINTMENT (OUTPATIENT)
Dept: RADIOLOGY | Facility: MEDICAL CENTER | Age: 89
DRG: 516 | End: 2024-02-29
Attending: INTERNAL MEDICINE
Payer: MEDICARE

## 2024-02-29 ENCOUNTER — ANESTHESIA EVENT (OUTPATIENT)
Dept: RADIOLOGY | Facility: MEDICAL CENTER | Age: 89
DRG: 516 | End: 2024-02-29
Payer: MEDICARE

## 2024-02-29 ENCOUNTER — ANESTHESIA (OUTPATIENT)
Dept: RADIOLOGY | Facility: MEDICAL CENTER | Age: 89
DRG: 516 | End: 2024-02-29
Payer: MEDICARE
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
## 2024-02-29 LAB
ANION GAP SERPL CALC-SCNC: 12 MMOL/L (ref 7–16)
BACTERIA UR CULT: ABNORMAL
BACTERIA UR CULT: ABNORMAL
BUN SERPL-MCNC: 13 MG/DL (ref 8–22)
CALCIUM SERPL-MCNC: 8.5 MG/DL (ref 8.5–10.5)
CHLORIDE SERPL-SCNC: 96 MMOL/L (ref 96–112)
CO2 SERPL-SCNC: 24 MMOL/L (ref 20–33)
CREAT SERPL-MCNC: 0.27 MG/DL (ref 0.5–1.4)
EKG IMPRESSION: NORMAL
ERYTHROCYTE [DISTWIDTH] IN BLOOD BY AUTOMATED COUNT: 44 FL (ref 35.9–50)
GFR SERPLBLD CREATININE-BSD FMLA CKD-EPI: 104 ML/MIN/1.73 M 2
GLUCOSE BLD STRIP.AUTO-MCNC: 115 MG/DL (ref 65–99)
GLUCOSE BLD STRIP.AUTO-MCNC: 235 MG/DL (ref 65–99)
GLUCOSE BLD STRIP.AUTO-MCNC: 239 MG/DL (ref 65–99)
GLUCOSE SERPL-MCNC: 125 MG/DL (ref 65–99)
HCT VFR BLD AUTO: 36.1 % (ref 37–47)
HGB BLD-MCNC: 12.1 G/DL (ref 12–16)
MCH RBC QN AUTO: 31.5 PG (ref 27–33)
MCHC RBC AUTO-ENTMCNC: 33.5 G/DL (ref 32.2–35.5)
MCV RBC AUTO: 94 FL (ref 81.4–97.8)
PLATELET # BLD AUTO: 319 K/UL (ref 164–446)
PMV BLD AUTO: 8 FL (ref 9–12.9)
POTASSIUM SERPL-SCNC: 4 MMOL/L (ref 3.6–5.5)
RBC # BLD AUTO: 3.84 M/UL (ref 4.2–5.4)
SIGNIFICANT IND 70042: ABNORMAL
SITE SITE: ABNORMAL
SODIUM SERPL-SCNC: 132 MMOL/L (ref 135–145)
SOURCE SOURCE: ABNORMAL
WBC # BLD AUTO: 13 K/UL (ref 4.8–10.8)

## 2024-02-29 PROCEDURE — 700111 HCHG RX REV CODE 636 W/ 250 OVERRIDE (IP): Performed by: INTERNAL MEDICINE

## 2024-02-29 PROCEDURE — 82962 GLUCOSE BLOOD TEST: CPT

## 2024-02-29 PROCEDURE — 700102 HCHG RX REV CODE 250 W/ 637 OVERRIDE(OP): Performed by: INTERNAL MEDICINE

## 2024-02-29 PROCEDURE — 160002 HCHG RECOVERY MINUTES (STAT)

## 2024-02-29 PROCEDURE — 93010 ELECTROCARDIOGRAM REPORT: CPT | Performed by: INTERNAL MEDICINE

## 2024-02-29 PROCEDURE — 700111 HCHG RX REV CODE 636 W/ 250 OVERRIDE (IP): Performed by: ANESTHESIOLOGY

## 2024-02-29 PROCEDURE — 700111 HCHG RX REV CODE 636 W/ 250 OVERRIDE (IP): Performed by: STUDENT IN AN ORGANIZED HEALTH CARE EDUCATION/TRAINING PROGRAM

## 2024-02-29 PROCEDURE — 99233 SBSQ HOSP IP/OBS HIGH 50: CPT | Performed by: STUDENT IN AN ORGANIZED HEALTH CARE EDUCATION/TRAINING PROGRAM

## 2024-02-29 PROCEDURE — 85027 COMPLETE CBC AUTOMATED: CPT

## 2024-02-29 PROCEDURE — 0QS03ZZ REPOSITION LUMBAR VERTEBRA, PERCUTANEOUS APPROACH: ICD-10-PCS | Performed by: RADIOLOGY

## 2024-02-29 PROCEDURE — 80048 BASIC METABOLIC PNL TOTAL CA: CPT

## 2024-02-29 PROCEDURE — 700102 HCHG RX REV CODE 250 W/ 637 OVERRIDE(OP): Performed by: STUDENT IN AN ORGANIZED HEALTH CARE EDUCATION/TRAINING PROGRAM

## 2024-02-29 PROCEDURE — 160035 HCHG PACU - 1ST 60 MINS PHASE I

## 2024-02-29 PROCEDURE — 93005 ELECTROCARDIOGRAM TRACING: CPT | Performed by: STUDENT IN AN ORGANIZED HEALTH CARE EDUCATION/TRAINING PROGRAM

## 2024-02-29 PROCEDURE — 36415 COLL VENOUS BLD VENIPUNCTURE: CPT

## 2024-02-29 PROCEDURE — 96372 THER/PROPH/DIAG INJ SC/IM: CPT

## 2024-02-29 PROCEDURE — 700101 HCHG RX REV CODE 250: Performed by: INTERNAL MEDICINE

## 2024-02-29 PROCEDURE — 97166 OT EVAL MOD COMPLEX 45 MIN: CPT

## 2024-02-29 PROCEDURE — 770001 HCHG ROOM/CARE - MED/SURG/GYN PRIV*

## 2024-02-29 PROCEDURE — 97535 SELF CARE MNGMENT TRAINING: CPT

## 2024-02-29 PROCEDURE — 96376 TX/PRO/DX INJ SAME DRUG ADON: CPT

## 2024-02-29 PROCEDURE — A9270 NON-COVERED ITEM OR SERVICE: HCPCS | Performed by: STUDENT IN AN ORGANIZED HEALTH CARE EDUCATION/TRAINING PROGRAM

## 2024-02-29 PROCEDURE — 4401705 IR-KYPHOPLASTY,1 VERTEBRA,LUMBAR

## 2024-02-29 PROCEDURE — 700105 HCHG RX REV CODE 258: Performed by: ANESTHESIOLOGY

## 2024-02-29 PROCEDURE — A9270 NON-COVERED ITEM OR SERVICE: HCPCS | Performed by: INTERNAL MEDICINE

## 2024-02-29 PROCEDURE — 0QU03JZ SUPPLEMENT LUMBAR VERTEBRA WITH SYNTHETIC SUBSTITUTE, PERCUTANEOUS APPROACH: ICD-10-PCS | Performed by: RADIOLOGY

## 2024-02-29 PROCEDURE — 700101 HCHG RX REV CODE 250: Performed by: ANESTHESIOLOGY

## 2024-02-29 RX ORDER — HALOPERIDOL 5 MG/ML
1 INJECTION INTRAMUSCULAR
Status: DISCONTINUED | OUTPATIENT
Start: 2024-02-29 | End: 2024-02-29 | Stop reason: HOSPADM

## 2024-02-29 RX ORDER — EPHEDRINE SULFATE 50 MG/ML
INJECTION, SOLUTION INTRAVENOUS PRN
Status: DISCONTINUED | OUTPATIENT
Start: 2024-02-29 | End: 2024-02-29 | Stop reason: SURG

## 2024-02-29 RX ORDER — CEFAZOLIN SODIUM 1 G/3ML
INJECTION, POWDER, FOR SOLUTION INTRAMUSCULAR; INTRAVENOUS PRN
Status: DISCONTINUED | OUTPATIENT
Start: 2024-02-29 | End: 2024-02-29 | Stop reason: SURG

## 2024-02-29 RX ORDER — OXYCODONE HCL 5 MG/5 ML
10 SOLUTION, ORAL ORAL
Status: DISCONTINUED | OUTPATIENT
Start: 2024-02-29 | End: 2024-02-29 | Stop reason: HOSPADM

## 2024-02-29 RX ORDER — CIPROFLOXACIN 500 MG/1
500 TABLET, FILM COATED ORAL EVERY 12 HOURS
Status: DISCONTINUED | OUTPATIENT
Start: 2024-02-29 | End: 2024-03-01 | Stop reason: HOSPADM

## 2024-02-29 RX ORDER — DEXAMETHASONE SODIUM PHOSPHATE 4 MG/ML
INJECTION, SOLUTION INTRA-ARTICULAR; INTRALESIONAL; INTRAMUSCULAR; INTRAVENOUS; SOFT TISSUE PRN
Status: DISCONTINUED | OUTPATIENT
Start: 2024-02-29 | End: 2024-02-29 | Stop reason: SURG

## 2024-02-29 RX ORDER — OXYCODONE HCL 5 MG/5 ML
5 SOLUTION, ORAL ORAL
Status: DISCONTINUED | OUTPATIENT
Start: 2024-02-29 | End: 2024-02-29 | Stop reason: HOSPADM

## 2024-02-29 RX ORDER — SODIUM CHLORIDE, SODIUM LACTATE, POTASSIUM CHLORIDE, CALCIUM CHLORIDE 600; 310; 30; 20 MG/100ML; MG/100ML; MG/100ML; MG/100ML
INJECTION, SOLUTION INTRAVENOUS CONTINUOUS
Status: DISCONTINUED | OUTPATIENT
Start: 2024-02-29 | End: 2024-02-29 | Stop reason: HOSPADM

## 2024-02-29 RX ORDER — LABETALOL HYDROCHLORIDE 5 MG/ML
5 INJECTION, SOLUTION INTRAVENOUS
Status: DISCONTINUED | OUTPATIENT
Start: 2024-02-29 | End: 2024-02-29 | Stop reason: HOSPADM

## 2024-02-29 RX ORDER — HYDROMORPHONE HYDROCHLORIDE 1 MG/ML
0.4 INJECTION, SOLUTION INTRAMUSCULAR; INTRAVENOUS; SUBCUTANEOUS
Status: DISCONTINUED | OUTPATIENT
Start: 2024-02-29 | End: 2024-02-29 | Stop reason: HOSPADM

## 2024-02-29 RX ORDER — ENOXAPARIN SODIUM 100 MG/ML
30 INJECTION SUBCUTANEOUS DAILY
Status: DISCONTINUED | OUTPATIENT
Start: 2024-02-29 | End: 2024-03-01 | Stop reason: HOSPADM

## 2024-02-29 RX ORDER — EPHEDRINE SULFATE 50 MG/ML
5 INJECTION, SOLUTION INTRAVENOUS
Status: DISCONTINUED | OUTPATIENT
Start: 2024-02-29 | End: 2024-02-29 | Stop reason: HOSPADM

## 2024-02-29 RX ORDER — LIDOCAINE HYDROCHLORIDE 20 MG/ML
INJECTION, SOLUTION EPIDURAL; INFILTRATION; INTRACAUDAL; PERINEURAL PRN
Status: DISCONTINUED | OUTPATIENT
Start: 2024-02-29 | End: 2024-02-29 | Stop reason: SURG

## 2024-02-29 RX ORDER — HYDROMORPHONE HYDROCHLORIDE 1 MG/ML
0.1 INJECTION, SOLUTION INTRAMUSCULAR; INTRAVENOUS; SUBCUTANEOUS
Status: DISCONTINUED | OUTPATIENT
Start: 2024-02-29 | End: 2024-02-29 | Stop reason: HOSPADM

## 2024-02-29 RX ORDER — HYDRALAZINE HYDROCHLORIDE 20 MG/ML
5 INJECTION INTRAMUSCULAR; INTRAVENOUS
Status: DISCONTINUED | OUTPATIENT
Start: 2024-02-29 | End: 2024-02-29 | Stop reason: HOSPADM

## 2024-02-29 RX ORDER — HYDROMORPHONE HYDROCHLORIDE 1 MG/ML
0.2 INJECTION, SOLUTION INTRAMUSCULAR; INTRAVENOUS; SUBCUTANEOUS
Status: DISCONTINUED | OUTPATIENT
Start: 2024-02-29 | End: 2024-02-29 | Stop reason: HOSPADM

## 2024-02-29 RX ORDER — ONDANSETRON 2 MG/ML
4 INJECTION INTRAMUSCULAR; INTRAVENOUS
Status: DISCONTINUED | OUTPATIENT
Start: 2024-02-29 | End: 2024-02-29 | Stop reason: HOSPADM

## 2024-02-29 RX ORDER — ONDANSETRON 2 MG/ML
INJECTION INTRAMUSCULAR; INTRAVENOUS PRN
Status: DISCONTINUED | OUTPATIENT
Start: 2024-02-29 | End: 2024-02-29 | Stop reason: SURG

## 2024-02-29 RX ORDER — DIPHENHYDRAMINE HYDROCHLORIDE 50 MG/ML
12.5 INJECTION INTRAMUSCULAR; INTRAVENOUS
Status: DISCONTINUED | OUTPATIENT
Start: 2024-02-29 | End: 2024-02-29 | Stop reason: HOSPADM

## 2024-02-29 RX ORDER — SODIUM CHLORIDE, SODIUM LACTATE, POTASSIUM CHLORIDE, CALCIUM CHLORIDE 600; 310; 30; 20 MG/100ML; MG/100ML; MG/100ML; MG/100ML
INJECTION, SOLUTION INTRAVENOUS
Status: DISCONTINUED | OUTPATIENT
Start: 2024-02-29 | End: 2024-02-29 | Stop reason: SURG

## 2024-02-29 RX ADMIN — CIPROFLOXACIN 500 MG: 500 TABLET, FILM COATED ORAL at 17:08

## 2024-02-29 RX ADMIN — METHOCARBAMOL 500 MG: 500 TABLET ORAL at 20:53

## 2024-02-29 RX ADMIN — METHOCARBAMOL 500 MG: 500 TABLET ORAL at 13:37

## 2024-02-29 RX ADMIN — PROPOFOL 80 MG: 10 INJECTION, EMULSION INTRAVENOUS at 08:35

## 2024-02-29 RX ADMIN — GABAPENTIN 100 MG: 100 CAPSULE ORAL at 05:16

## 2024-02-29 RX ADMIN — EPHEDRINE SULFATE 10 MG: 50 INJECTION, SOLUTION INTRAVENOUS at 08:50

## 2024-02-29 RX ADMIN — FENTANYL CITRATE 25 MCG: 50 INJECTION, SOLUTION INTRAMUSCULAR; INTRAVENOUS at 09:13

## 2024-02-29 RX ADMIN — Medication 1000 UNITS: at 05:16

## 2024-02-29 RX ADMIN — ONDANSETRON 4 MG: 2 INJECTION INTRAMUSCULAR; INTRAVENOUS at 09:17

## 2024-02-29 RX ADMIN — INSULIN HUMAN 2 UNITS: 100 INJECTION, SOLUTION PARENTERAL at 20:50

## 2024-02-29 RX ADMIN — CEFTRIAXONE SODIUM 1000 MG: 10 INJECTION, POWDER, FOR SOLUTION INTRAVENOUS at 05:11

## 2024-02-29 RX ADMIN — LIDOCAINE HYDROCHLORIDE 50 MG: 20 INJECTION, SOLUTION EPIDURAL; INFILTRATION; INTRACAUDAL at 08:35

## 2024-02-29 RX ADMIN — DEXAMETHASONE SODIUM PHOSPHATE 4 MG: 4 INJECTION INTRA-ARTICULAR; INTRALESIONAL; INTRAMUSCULAR; INTRAVENOUS; SOFT TISSUE at 08:37

## 2024-02-29 RX ADMIN — INSULIN HUMAN 2 UNITS: 100 INJECTION, SOLUTION PARENTERAL at 13:39

## 2024-02-29 RX ADMIN — ROSUVASTATIN 10 MG: 10 TABLET, FILM COATED ORAL at 05:16

## 2024-02-29 RX ADMIN — INSULIN HUMAN 2 UNITS: 100 INJECTION, SOLUTION PARENTERAL at 17:12

## 2024-02-29 RX ADMIN — CEFAZOLIN 2 G: 1 INJECTION, POWDER, FOR SOLUTION INTRAMUSCULAR; INTRAVENOUS at 08:37

## 2024-02-29 RX ADMIN — ENOXAPARIN SODIUM 30 MG: 100 INJECTION SUBCUTANEOUS at 17:08

## 2024-02-29 RX ADMIN — LIDOCAINE 1 PATCH: 4 PATCH TOPICAL at 05:17

## 2024-02-29 RX ADMIN — IRBESARTAN 300 MG: 150 TABLET ORAL at 05:15

## 2024-02-29 RX ADMIN — SUGAMMADEX 200 MG: 100 INJECTION, SOLUTION INTRAVENOUS at 09:17

## 2024-02-29 RX ADMIN — GABAPENTIN 100 MG: 100 CAPSULE ORAL at 13:37

## 2024-02-29 RX ADMIN — METHOCARBAMOL 500 MG: 500 TABLET ORAL at 07:46

## 2024-02-29 RX ADMIN — ROCURONIUM BROMIDE 50 MG: 10 INJECTION, SOLUTION INTRAVENOUS at 08:35

## 2024-02-29 RX ADMIN — METFORMIN HYDROCHLORIDE 500 MG: 500 TABLET ORAL at 05:17

## 2024-02-29 RX ADMIN — METHOCARBAMOL 500 MG: 500 TABLET ORAL at 17:08

## 2024-02-29 RX ADMIN — FENTANYL CITRATE 50 MCG: 50 INJECTION, SOLUTION INTRAMUSCULAR; INTRAVENOUS at 09:05

## 2024-02-29 RX ADMIN — FENTANYL CITRATE 25 MCG: 50 INJECTION, SOLUTION INTRAMUSCULAR; INTRAVENOUS at 08:58

## 2024-02-29 RX ADMIN — AMLODIPINE BESYLATE 5 MG: 5 TABLET ORAL at 05:17

## 2024-02-29 RX ADMIN — SODIUM CHLORIDE, POTASSIUM CHLORIDE, SODIUM LACTATE AND CALCIUM CHLORIDE: 600; 310; 30; 20 INJECTION, SOLUTION INTRAVENOUS at 08:19

## 2024-02-29 RX ADMIN — GABAPENTIN 100 MG: 100 CAPSULE ORAL at 17:08

## 2024-02-29 ASSESSMENT — PAIN DESCRIPTION - PAIN TYPE
TYPE: ACUTE PAIN
TYPE: ACUTE PAIN;SURGICAL PAIN
TYPE: ACUTE PAIN

## 2024-02-29 ASSESSMENT — ENCOUNTER SYMPTOMS
BACK PAIN: 1
WEAKNESS: 1
FALLS: 1

## 2024-02-29 ASSESSMENT — COGNITIVE AND FUNCTIONAL STATUS - GENERAL
SUGGESTED CMS G CODE MODIFIER DAILY ACTIVITY: CJ
DRESSING REGULAR LOWER BODY CLOTHING: A LITTLE
DAILY ACTIVITIY SCORE: 21
TOILETING: A LITTLE
HELP NEEDED FOR BATHING: A LITTLE

## 2024-02-29 ASSESSMENT — ACTIVITIES OF DAILY LIVING (ADL): TOILETING: INDEPENDENT

## 2024-02-29 ASSESSMENT — PAIN SCALES - GENERAL: PAIN_LEVEL: 0

## 2024-02-29 ASSESSMENT — FIBROSIS 4 INDEX: FIB4 SCORE: 1.39

## 2024-02-29 NOTE — THERAPY
"Occupational Therapy   Initial Evaluation     Patient Name: Mariana Jett  Age:  89 y.o., Sex:  female  Medical Record #: [id number]  Today's Date: 2/29/2024     Precautions: Fall Risk, Spinal / Back Precautions  (spine prec for comfort)    Assessment    Patient is 89 y.o. female who recently moved to [facility] in [place] (from [facility]) and fell within 5 days of moving in. Pt had intractable back pain following GLF. Dx with L2 compression fx. Underwent kyphoplasty today. Pt seen for OT eval and treatment. Pt reports much improved pain control. She participated in: bed mobility, toileting on BSC, LB dressing with AE, seated grooming. See grid below for details. Pt had difficulty using AE in part due to heel Mepilex. Pt appears to be somewhat below most recent baseline function, but may also be adjusting to brand new [facility] environment. Will continue to benefit from acute OT to maximize functional independence and safety.     Plan    Occupational Therapy Initial Treatment Plan   Treatment Interventions: Self Care / Activities of Daily Living, Adaptive Equipment, Neuro Re-Education / Balance, Therapeutic Activity  Treatment Frequency: 3 Times per Week  Duration: Until Therapy Goals Met    DC Equipment Recommendations: None  Discharge Recommendations: Recommend post-acute placement for additional occupational therapy services prior to discharge home (if ineligle or refuses, then HH would be next best option)     Subjective    \"I like my apt but I didn't really like the care.\" (pt cites calling for help and having no response for 2 hours)     Objective       02/29/24 1254   Prior Living Situation   Prior Services Housekeeping / Homemaker Services;Other (Comments)  ([facility])   Housing / Facility Assisted Living Residence   Steps Into Home 0   Steps In Home 0   Bathroom Set up Walk In Shower;Grab Bars;Shower Chair   Equipment Owned Front-Wheel Walker;4-Wheel Walker;Single Point Cane;Wheelchair;Tub / Shower Seat;Grab Bar(s) In Tub / Shower;Grab " "Bar(s) By Toilet;Sock Aid;Reacher   Lives with - Patient's Self Care Capacity Other (Comments)  ([facility])   Comments Pt recently moved to [facility] in [place] where she has assist with I-ADL   Prior Level of ADL Function   Self Feeding Independent   Grooming / Hygiene Independent   Bathing Requires Assist  (seated)   Dressing Independent   Toileting Independent   Prior Level of IADL Function   Laundry Requires Assist   Finances Requires Assist   Home Management Requires Assist   Shopping Requires Assist   Prior Level Of Mobility Independent With Device in Home  (uses 4WW \"most of the time\"; limited community mobility)   Driving / Transportation Relatives / Others Provide Transportation   Leisure Interests Other (Comments)  ([place]   History of Falls   History of Falls Yes   Precautions   Precautions Fall Risk;Spinal / Back Precautions   (spine prec for comfort)   Vitals   Pulse 87   Patient BP Position Sitting   Blood Pressure  133/56   Pulse Oximetry 96 %   O2 (LPM) 2   O2 Delivery Device Silicone Nasal Cannula   Pain   Pain Scales 0 to 10 Scale    Pain 0 - 10 Group   Therapist Pain Assessment Post Activity Pain Same as Prior to Activity;Nurse Notified;0  (denies pain)   Non Verbal Descriptors   Non Verbal Scale  Calm;Unlabored Breathing   Cognition    Cognition / Consciousness X   Level of Consciousness Alert   New Learning Impaired   Comments Pt oriented x 4, but somewhat forgetful, having difficulty with time lines and detail recall   Active ROM Upper Body   Active ROM Upper Body  X   Dominant Hand Right   Comments B shoulders limited to ~90 degrees; long-standing; L trigger fingers 3-5   Strength Upper Body   Upper Body Strength  WDL   Comments age-appropriate   Upper Body Muscle Tone   Upper Body Muscle Tone  WDL   Coordination Upper Body   Coordination WDL   Balance Assessment   Sitting Balance (Static) Fair +   Sitting Balance (Dynamic) Fair   Standing Balance (Static) Fair -   Standing Balance (Dynamic) Fair -   Weight " "Shift Sitting Fair   Weight Shift Standing Fair   Comments FWW for standing   Bed Mobility    Supine to Sit Minimal Assist  (via log roll to L)   Sit to Supine   (up to chair post)   Scooting Supervised  (seated)   Rolling Supervised   Comments HOB slightly elevated   ADL Assessment   Grooming Supervision;Seated  (oral care in chair)   Lower Body Dressing Moderate Assist  (doff/don B socks using AE (Mepilex to heels negatively impacts); unable to tailor sit)   Toileting Contact Guard Assist  (BM and urination on BSC)   Functional Mobility   Sit to Stand Standby Assist   Bed, Chair, Wheelchair Transfer Standby Assist   Toilet Transfers Standby Assist   Transfer Method Stand Pivot  (FWW)   Mobility Supine > EOB, pivot to/from BSC > chair   Visual Perception   Visual Perception  WDL  (wears corrective lenses)   Edema / Skin Assessment   Edema / Skin  Not Assessed   Activity Tolerance   Sitting in Chair >20 min; up post   Sitting Edge of Bed 3 min   Standing 2 min   Comments no overt signs of fatigue, dizziness, SOB, increased pain   Patient / Family Goals   Patient / Family Goal #1 \"To walk\"   Short Term Goals   Short Term Goal # 1 Pt will complete toileting with supv   Short Term Goal # 2 Pt will complete LB dressing with supv using AE PRN   Short Term Goal # 3 Pt will complete standing grooming with supv   Education Group   Education Provided Role of Occupational Therapist;Spinal Precautions;Adaptive Equipment;Home Safety   Role of Occupational Therapist Patient Response Patient;Acceptance;Explanation;Verbal Demonstration   Spinal Precautions Patient Response Patient;Acceptance;Explanation;Demonstration;Verbal Demonstration;Action Demonstration;Reinforcement Needed  (neutral spine for comfort; avoiding twisting, forward bending)   Home Safety Patient Response Patient;Acceptance;Explanation;Verbal Demonstration  (supv for seated bathing)   Adaptive Equipment Patient Response " Patient;Acceptance;Explanation;Demonstration;Verbal Demonstration;Reinforcement Needed  (use of reacher and sock-aid for compensatory LB dressing)

## 2024-02-29 NOTE — ASSESSMENT & PLAN NOTE
Following GLF. Patient is currently living at assisted living facility  MRI notes Acute compression fracture with bone marrow edema at L2 with superior endplate deformity. Small dorsal intradural CSF signal intensity collection likely representing a subdural collection within the thecal sac between L3 and L4. This displaces the cauda equina ventrally. Moderate to severe foraminal narrowing L4-5 and L5-S1  I have discussed Mri findings with spine surgery Dr. Chew, no surgical interventions  IR consulted, planning kyphoplasty 2/29  Multimodal pain managements including po and iv narcotics prn. Monitoring respiratory status and sedation score  PT/OT

## 2024-02-29 NOTE — OR SURGEON
Immediate Post- Operative Note        Findings: L2 compression fracture with 30% loss of height. Cementoplasty performed.    Procedure(s): L2 cementoplasty      Estimated Blood Loss: Less than 5 ml      Complications: None      2/29/2024     9:25 AM     Farrukh Sawyer M.D.

## 2024-02-29 NOTE — DISCHARGE INSTRUCTIONS
No strenuous activity including straining, bending, heavy lifting, or twisting for 1 to 2 weeks.  Do not lift anything greater than 7 pounds for 1 to 2 weeks.  No driving for 48 hours.  Okay to remove bandage 24 to 48 hours after procedure.  Do not submerge procedure site in water for 7 days (bathtubs, hot tubs, etc.).  Okay to shower 48 hours after procedure and pat area dry. Use ice pack in 20-minute increments as needed to reduce pain and swelling at procedure site.  -Follow-up outpatient with general practitioner or neurosurgeon      Discharge Instructions per Irina Correa M.D.    Please follow-up with PCP as outpatient.  Continue taking medications as prescribed    Return to ER in the event of new or worsening symptoms. Please note importance of compliance and the patient has agreed to proceed with all medical recommendations and follow up plan indicated above. All medications come with benefits and risks. Risks may include permanent injury or death and these risks can be minimized with close reassessment and monitoring. Please make it to your scheduled follow ups with PCP

## 2024-02-29 NOTE — CARE PLAN
The patient is Stable - Low risk of patient condition declining or worsening    Shift Goals  Clinical Goals: NPO post midnight for IR procedure in tomorrow, pain control  Patient Goals: rest and comfort  Family Goals:     Progress made toward(s) clinical / shift goals:      Patient is aox4, denies any chest pain/SOB/dizziness/N and V. Able to follow command. Instructed to be on NPO after midnight in prep for procedure tomorrow. Patient verbalize understanding. Patient refused SCDs educated importance of SCD. Side rails up, bed alarm on, call light and belonging within reach.advise to call for assistance. Check for incontinence.hourly rounds in place.        Problem: Pain - Standard  Goal: Alleviation of pain or a reduction in pain to the patient’s comfort goal  Outcome: Progressing   Denies any numbness or tingling, complains of mid to low back pain, Medicated per MAR, instructed patient to call RN if pain starts to get worse. Patient verbalize understanding.      Patient is not progressing towards the following goals:

## 2024-02-29 NOTE — PROGRESS NOTES
Hospital Medicine Daily Progress Note    Date of Service  2/29/2024    Chief Complaint  Mariana Jett is a 89 y.o. female admitted 2/27/2024 with fall and compression fracture    Hospital Course  89 y.o. female who presented 2/27/2024 as a transfer from [facility] for L2 compression fracture requiring kyphoplasty. Patient recently moved from [facility] to [place] and went right into assisted living.  She has been in assisted living for the past 5 days. Patient suffered a fall and landed on her back and since then she has been progressively getting worse and worse on her pain. She was evaluated and found to have an L2 compression fracture at [facility]. She is transferred here for Kyphoplasty  Patient is also reports suprapubic tenderness, frequency of urination and also some dysuria. UA positive. She is started on Iv ceftriaxone.     Mri lumbar: Acute compression fracture with bone marrow edema at L2 with superior endplate deformity. Small dorsal intradural CSF signal intensity collection likely representing a subdural collection within the thecal sac between L3 and L4. This displaces the cauda equina ventrally. Moderate to severe foraminal narrowing L4-5 and L5-S1. Case was discussed with spine surgeon Dr. Chew, no surgical indication    S/p kyphoplasty 2/29    Urine culture notes pseudomonas. I will change iv abx ceftriaxone to cipro. Pending EKG    Interval Problem Update  Seen patient at bedside  I have reviewed Mri independently  I have consulted and discussed with spine surgeon Dr. Chew  S/p kyphoplasty 2/29  Urine culture notes pseudomonas. I will change iv abx ceftriaxone to cipro. Pending EKG    I have discussed this patient's plan of care and discharge plan at IDT rounds today with Case Management, Nursing, Nursing leadership, and other members of the IDT team.    Consultants/Specialty  Spine  IR    Code Status  DNAR/DNI    Disposition  The patient is not medically cleared for discharge to home or a post-acute  facility.      I have placed the appropriate orders for post-discharge needs.    Review of Systems  Review of Systems   Constitutional:  Positive for malaise/fatigue.   Genitourinary:  Positive for dysuria and frequency.   Musculoskeletal:  Positive for back pain, falls and joint pain.   Neurological:  Positive for weakness.   All other systems reviewed and are negative.       Physical Exam  Temp:  [36.3 °C (97.3 °F)-36.7 °C (98.1 °F)] 36.3 °C (97.3 °F)  Pulse:  [71-94] 78  Resp:  [14-26] 16  BP: (105-157)/(51-69) 127/58  SpO2:  [93 %-99 %] 95 %    Physical Exam  Vitals and nursing note reviewed.   Constitutional:       Appearance: Normal appearance. She is ill-appearing.   HENT:      Head: Normocephalic and atraumatic.      Nose: Nose normal.      Mouth/Throat:      Pharynx: Oropharynx is clear.   Eyes:      Extraocular Movements: Extraocular movements intact.      Conjunctiva/sclera: Conjunctivae normal.      Pupils: Pupils are equal, round, and reactive to light.   Cardiovascular:      Rate and Rhythm: Normal rate and regular rhythm.      Pulses: Normal pulses.      Heart sounds: Normal heart sounds.   Pulmonary:      Effort: Pulmonary effort is normal.      Breath sounds: Normal breath sounds.   Abdominal:      General: Abdomen is flat. Bowel sounds are normal.      Palpations: Abdomen is soft.   Musculoskeletal:         General: Tenderness present.      Cervical back: Normal range of motion and neck supple.      Comments: Patient lying in back, barely moving due to significant pain   Skin:     General: Skin is warm and dry.   Neurological:      General: No focal deficit present.      Mental Status: She is alert and oriented to person, place, and time. Mental status is at baseline.   Psychiatric:         Mood and Affect: Mood normal.         Behavior: Behavior normal.         Fluids    Intake/Output Summary (Last 24 hours) at 2/29/2024 1541  Last data filed at 2/29/2024 0940  Gross per 24 hour   Intake 700 ml    Output 455 ml   Net 245 ml       Laboratory  Recent Labs     02/27/24  0505 02/28/24  0452 02/29/24  0330   WBC 15.4* 14.9* 13.0*   RBC 3.76* 4.02* 3.84*   HEMOGLOBIN 12.1 12.9 12.1   HEMATOCRIT 35.3* 37.7 36.1*   MCV 93.9 93.8 94.0   MCH 32.2 32.1 31.5   MCHC 34.3 34.2 33.5   RDW 44.2 44.9 44.0   PLATELETCT 361 329 319   MPV 7.7* 7.9* 8.0*     Recent Labs     02/27/24  0505 02/28/24  0452 02/29/24  0330   SODIUM 135 132* 132*   POTASSIUM 4.1 4.4 4.0   CHLORIDE 98 97 96   CO2 27 24 24   GLUCOSE 124* 134* 125*   BUN 21 15 13   CREATININE 0.36* 0.21* 0.27*   CALCIUM 8.5 8.4* 8.5                   Imaging  MR-LUMBAR SPINE-W/O   Final Result         Acute compression fracture with bone marrow edema at L2 with superior endplate deformity causing approximately 30% loss of height and minimal posterior cortical retropulsion. There is mild to moderate canal narrowing at this level.      Small dorsal intradural CSF signal intensity collection likely representing a subdural collection within the thecal sac between L3 and L4. This displaces the cauda equina ventrally.      Advanced degenerative changes at multiple levels of the lumbar spine.      There is moderate right foraminal narrowing at L1-2 and bilateral moderate foraminal narrowing at L2-3.      At L4-5 there is bilateral moderate to severe foraminal narrowing.      At L5-S1 there is severe right and moderate left foraminal narrowing.      IR-KYPHOPLASTY,1 VERTEBRA,LUMBAR    (Results Pending)        Assessment/Plan  * Closed compression fracture of L2 lumbar vertebra, initial encounter (HCC)- (present on admission)  Assessment & Plan  Following GLF. Patient is currently living at assisted living facility  MRI notes Acute compression fracture with bone marrow edema at L2 with superior endplate deformity. Small dorsal intradural CSF signal intensity collection likely representing a subdural collection within the thecal sac between L3 and L4. This displaces the cauda  equina ventrally. Moderate to severe foraminal narrowing L4-5 and L5-S1  I have discussed Mri findings with spine surgery Dr. Chew, no surgical interventions  IR consulted, planning kyphoplasty 2/29  Multimodal pain managements including po and iv narcotics prn. Monitoring respiratory status and sedation score  PT/OT    Advance care planning- (present on admission)  Assessment & Plan  DNR/DNI, discussed by admitting MD    UTI (urinary tract infection)- (present on admission)  Assessment & Plan  UA pos pyuria and with positive UTI symptoms  Follow urine culture pseudomonas. I will change iv abx ceftriaxone to cipro. Pending EKG  Monitoring CBC and CMP    Dyslipidemia- (present on admission)  Assessment & Plan  Continue statin    Hypertension- (present on admission)  Assessment & Plan  Continue home med amlodipine and Avapro  Continue to monitor     Hyponatremia- (present on admission)  Assessment & Plan  Mild, continue to monitor         VTE prophylaxis: Lovenox    I have performed a physical exam and reviewed and updated ROS and Plan today (2/29/2024). In review of yesterday's note (2/28/2024), there are no changes except as documented above.    Patient is has a high medical complexity, complex decision making and is at high risk for complication, morbidity, and mortality.    My total time spent caring for the patient on the day of the encounter was 53 minutes.   This does not include time spent on separately billable procedures/tests.       Thrombocytopenia Leukopenia

## 2024-02-29 NOTE — PROGRESS NOTES
Patient has blood pressure meds and metformin, informed hospitalist Bernard if I can proceed to administer it, Responded to proceed. Informed also latest NA level

## 2024-02-29 NOTE — PROGRESS NOTES
Pt presents to IR2. Pt was consented by MD at bedside, confirmed by this RN and consent at bedside. Anesthesia consent confirmed and at bedside. Anesthesia care provided by . Pt transferred to IR table in prone position. Patient underwent a L2 vertebroplasty by Dr. Sawyer. Procedure site was marked by MD and verified using imaging guidance. Pt placed on monitor, prepped and draped in a sterile fashion. All vital signs monitoring and medication administration by anesthesia services - See anesthesia flowsheet for details. Report called to JATINDER Sue. Pt transported by shania with RN to [facility] 2B.       [facility]   REF: [id number]  LOT: [id number]  EXP: 10/31/2025

## 2024-02-29 NOTE — ANESTHESIA PREPROCEDURE EVALUATION
Date/Time: 02/29/24 0830    Scheduled providers: Farrukh Sawyer M.D.; Jony Moser M.D.    Procedure: IR-KYPHOPLASTY,1 VERTEBRA,LUMBAR    Diagnosis:             Lumbar compression fracture, closed, initial encounter ([facility]) [S32.000A]            Lumbar compression fracture, closed, initial encounter ([facility]) [S32.000A]    Indications: Vertebrae Fracture    Location: [facility]            Relevant Problems   ANESTHESIA (within normal limits)      PULMONARY (within normal limits)      NEURO (within normal limits)      CARDIAC   (positive) Hypertension      GI (within normal limits)       (within normal limits)      ENDO   (positive) Diabetes mellitus type II, controlled (HCC)      Other   (positive) Closed compression fracture of L2 lumbar vertebra, initial encounter ([facility])   (positive) Dyslipidemia   (positive) Hyponatremia   (positive) Leukocytosis       Physical Exam    Airway   Mallampati: II  TM distance: >3 FB  Neck ROM: full       Cardiovascular - normal exam  Rhythm: regular  Rate: normal  (-) murmur     Dental - normal exam           Pulmonary - normal exam  Breath sounds clear to auscultation     Abdominal    Neurological - normal exam                   Anesthesia Plan    ASA 2       Plan - general       Airway plan will be ETT          Induction: intravenous    Postoperative Plan: Postoperative administration of opioids is intended.    Pertinent diagnostic labs and testing reviewed    Informed Consent:    Anesthetic plan and risks discussed with patient.    Use of blood products discussed with: patient whom consented to blood products.

## 2024-02-29 NOTE — ASSESSMENT & PLAN NOTE
UA pos pyuria and with positive UTI symptoms  Follow urine culture pseudomonas. I will change iv abx ceftriaxone to cipro. Pending EKG  Monitoring CBC and CMP

## 2024-02-29 NOTE — ANESTHESIA POSTPROCEDURE EVALUATION
Patient: Mariana Jett    Procedure Summary       Date: 02/29/24 Room / Location: [facility]    Anesthesia Start: 0819 Anesthesia Stop: 0940    Procedure: IR-KYPHOPLASTY,1 VERTEBRA,LUMBAR Diagnosis:             Lumbar compression fracture, closed, initial encounter (HCC)            Lumbar compression fracture, closed, initial encounter (HCC)      (Vertebrae Fracture)    Scheduled Providers: Farrukh Sawyer M.D.; Jony Moser M.D. Responsible Provider: Jony Moser M.D.    Anesthesia Type: general ASA Status: 2            Final Anesthesia Type: general  Last vitals  BP   Blood Pressure : 111/54    Temp   36.3 °C (97.3 °F)    Pulse   86   Resp   16    SpO2   95 %      Anesthesia Post Evaluation    Patient location during evaluation: PACU  Patient participation: complete - patient participated  Level of consciousness: awake and alert  Pain score: 0    Airway patency: patent  Anesthetic complications: no  Cardiovascular status: hemodynamically stable  Respiratory status: acceptable  Hydration status: euvolemic    PONV: none          No notable events documented.     Nurse Pain Score: 0 (NPRS)

## 2024-02-29 NOTE — PROGRESS NOTES
4 Eyes Skin Assessment Completed by JATINDER Diaz and JATINDER Mcmullen.    Head WDL  Ears WDL  Nose WDL  Mouth WDL  Neck WDL  Breast/Chest WDL  Shoulder Blades WDL  Spine WDL  (R) Arm/Elbow/Hand Bruising  (L) Arm/Elbow/Hand Bruising  Abdomen WDL  Groin WDL  Scrotum/Coccyx/Buttocks Redness and Blanching, sacral mepilex placed  (R) Leg Bruising  (L) Leg Bruising  (R) Heel/Foot/Toe Redness, Blanching, and Boggy, mepilex in place  (L) Heel/Foot/Toe Redness, Blanching, and Boggy, mepilex in place          Devices In Places Blood Pressure Cuff and Pulse Ox      Interventions In Place Heel Mepilex, Sacral Mepilex, and Pillows    Possible Skin Injury No    Pictures Uploaded Into Epic N/A  Wound Consult Placed N/A  RN Wound Prevention Protocol Ordered Yes

## 2024-02-29 NOTE — THERAPY
Physical Therapy Contact Note    Patient Name: Mariana Jett  Age:  89 y.o., Sex:  female  Medical Record #: [id number]  Today's Date: 2/29/2024    Pt off the floor for kyphoplasty this morning. Will follow up post-procedure as able.

## 2024-02-29 NOTE — CARE PLAN
The patient is Stable - Low risk of patient condition declining or worsening    Shift Goals  Clinical Goals: NPO post midnight for IR procedure in tomorrow, pain control  Patient Goals: rest and comfort  Family Goals: Update onn POC    Progress made toward(s) clinical / shift goals:    Problem: Skin Integrity  Goal: Skin integrity is maintained or improved  Outcome: Progressing     Problem: Mobility  Goal: Patient's capacity to carry out activities will improve  2/29/2024 0728 by SIMONE Brown.N.  Outcome: Progressing  2/29/2024 0727 by Emily Finch R.N.  Outcome: Progressing     Problem: Self Care  Goal: Patient will have the ability to perform ADLs independently or with assistance (bathe, groom, dress, toilet and feed)  Outcome: Progressing     Problem: Infection - Standard  Goal: Patient will remain free from infection  Outcome: Progressing  Flowsheets (Taken 2/29/2024 0728)  Standard Infection Interventions:   Assessed for signs and symptoms of infection   Provided education on proper hand hygiene and infection prevention measures   Implemented standard precautions   Instructed patient/family on signs and symptoms of infection   Assessed for removal IV, central lines, intra-arterial or urinary catheters     Problem: Pain - Standard  Goal: Alleviation of pain or a reduction in pain to the patient’s comfort goal  2/29/2024 0728 by SIMONE Brown.N.  Outcome: Progressing  2/29/2024 0727 by Emily Finch, R.N.  Outcome: Progressing       Patient is not progressing towards the following goals:

## 2024-02-29 NOTE — PROGRESS NOTES
Hospital Medicine Daily Progress Note    Date of Service  2/28/2024    Chief Complaint  Mariana Jett is a 89 y.o. female admitted 2/27/2024 with fall and compression fracture    Hospital Course  89 y.o. female who presented 2/27/2024 as a transfer from [facility] for L2 compression fracture requiring kyphoplasty. Patient recently moved from [facility] to [place] and went right into assisted living.  She has been in assisted living for the past 5 days. Patient suffered a fall and landed on her back and since then she has been progressively getting worse and worse on her pain. She was evaluated and found to have an L2 compression fracture at [facility]. She is transferred here for Kyphoplasty  Patient is also reports suprapubic tenderness, frequency of urination and also some dysuria. UA positive. She is started on Iv ceftriaxone.     Mri lumbar: Acute compression fracture with bone marrow edema at L2 with superior endplate deformity. Small dorsal intradural CSF signal intensity collection likely representing a subdural collection within the thecal sac between L3 and L4. This displaces the cauda equina ventrally. Moderate to severe foraminal narrowing L4-5 and L5-S1    Interval Problem Update  Seen patient at bedside  Discussed with IR, requesting Mri  I have reviewed Mri independently  I have consulted and discussed with spine surgeon Dr. Chew  Pending Kyphoplasty tomorrow  Continue IV ceftriaxone.  Following cultures    I have discussed this patient's plan of care and discharge plan at IDT rounds today with Case Management, Nursing, Nursing leadership, and other members of the IDT team.    Consultants/Specialty  Spine  IR    Code Status  DNAR/DNI    Disposition  The patient is not medically cleared for discharge to home or a post-acute facility.      I have placed the appropriate orders for post-discharge needs.    Review of Systems  Review of Systems   Constitutional:  Positive for malaise/fatigue.   Genitourinary:   Positive for dysuria and frequency.   Musculoskeletal:  Positive for back pain, falls and joint pain.   Neurological:  Positive for weakness.   All other systems reviewed and are negative.       Physical Exam  Temp:  [36.3 °C (97.3 °F)-37 °C (98.6 °F)] 36.6 °C (97.9 °F)  Pulse:  [70-80] 80  Resp:  [14-17] 15  BP: (128-168)/(63-70) 128/70  SpO2:  [93 %-99 %] 96 %    Physical Exam  Vitals and nursing note reviewed.   Constitutional:       Appearance: Normal appearance. She is ill-appearing.   HENT:      Head: Normocephalic and atraumatic.      Nose: Nose normal.      Mouth/Throat:      Pharynx: Oropharynx is clear.   Eyes:      Extraocular Movements: Extraocular movements intact.      Conjunctiva/sclera: Conjunctivae normal.      Pupils: Pupils are equal, round, and reactive to light.   Cardiovascular:      Rate and Rhythm: Normal rate and regular rhythm.      Pulses: Normal pulses.      Heart sounds: Normal heart sounds.   Pulmonary:      Effort: Pulmonary effort is normal.      Breath sounds: Normal breath sounds.   Abdominal:      General: Abdomen is flat. Bowel sounds are normal.      Palpations: Abdomen is soft.   Musculoskeletal:         General: Tenderness present.      Cervical back: Normal range of motion and neck supple.      Comments: Patient lying in back, barely moving due to significant pain   Skin:     General: Skin is warm and dry.   Neurological:      General: No focal deficit present.      Mental Status: She is alert and oriented to person, place, and time. Mental status is at baseline.   Psychiatric:         Mood and Affect: Mood normal.         Behavior: Behavior normal.         Fluids    Intake/Output Summary (Last 24 hours) at 2/28/2024 1624  Last data filed at 2/28/2024 0815  Gross per 24 hour   Intake 60 ml   Output 50 ml   Net 10 ml       Laboratory  Recent Labs     02/26/24  0153 02/27/24  0505 02/28/24  0452   WBC 15.1* 15.4* 14.9*   RBC 3.94* 3.76* 4.02*   HEMOGLOBIN 12.7 12.1 12.9    HEMATOCRIT 37.0 35.3* 37.7   MCV 93.9 93.9 93.8   MCH 32.2 32.2 32.1   MCHC 34.3 34.3 34.2   RDW 43.8 44.2 44.9   PLATELETCT 395 361 329   MPV 7.7* 7.7* 7.9*     Recent Labs     02/26/24  0153 02/27/24  0505 02/28/24  0452   SODIUM 136 135 132*   POTASSIUM 3.5* 4.1 4.4   CHLORIDE 101 98 97   CO2 21 27 24   GLUCOSE 87 124* 134*   BUN 17 21 15   CREATININE 0.30* 0.36* 0.21*   CALCIUM 8.1* 8.5 8.4*                   Imaging  MR-LUMBAR SPINE-W/O   Final Result         Acute compression fracture with bone marrow edema at L2 with superior endplate deformity causing approximately 30% loss of height and minimal posterior cortical retropulsion. There is mild to moderate canal narrowing at this level.      Small dorsal intradural CSF signal intensity collection likely representing a subdural collection within the thecal sac between L3 and L4. This displaces the cauda equina ventrally.      Advanced degenerative changes at multiple levels of the lumbar spine.      There is moderate right foraminal narrowing at L1-2 and bilateral moderate foraminal narrowing at L2-3.      At L4-5 there is bilateral moderate to severe foraminal narrowing.      At L5-S1 there is severe right and moderate left foraminal narrowing.      IR-KYPHOPLASTY,1 VERTEBRA,LUMBAR    (Results Pending)        Assessment/Plan  * Closed compression fracture of L2 lumbar vertebra, initial encounter (HCC)- (present on admission)  Assessment & Plan  Following GLF. Patient is currently living at assisted living facility  MRI notes Acute compression fracture with bone marrow edema at L2 with superior endplate deformity. Small dorsal intradural CSF signal intensity collection likely representing a subdural collection within the thecal sac between L3 and L4. This displaces the cauda equina ventrally. Moderate to severe foraminal narrowing L4-5 and L5-S1  I have discussed Mri findings with spine surgery Dr. Chew, no surgical interventions  IR consulted, planning  kyphoplasty 2/29  Multimodal pain managements including po and iv narcotics prn. Monitoring respiratory status and sedation score  PT/OT    Advance care planning- (present on admission)  Assessment & Plan  DNR/DNI, discussed by admitting MD    UTI (urinary tract infection)- (present on admission)  Assessment & Plan  UA pos pyuria and with positive UTI symptoms  Follow urine culture  Continue iv ceftriaxone   Monitoring CBC and CMP    Dyslipidemia- (present on admission)  Assessment & Plan  Continue statin    Hypertension- (present on admission)  Assessment & Plan  Continue home med amlodipine and Avapro  Continue to monitor     Hyponatremia- (present on admission)  Assessment & Plan  Mild, continue to monitor         VTE prophylaxis: SCD    I have performed a physical exam and reviewed and updated ROS and Plan today (2/28/2024). In review of yesterday's note (2/27/2024), there are no changes except as documented above.    Patient is has a high medical complexity, complex decision making and is at high risk for complication, morbidity, and mortality.    My total time spent caring for the patient on the day of the encounter was 52 minutes.   This does not include time spent on separately billable procedures/tests.

## 2024-02-29 NOTE — CARE PLAN
The patient is Watcher - Medium risk of patient condition declining or worsening    Shift Goals  Clinical Goals: MRI, IR procedure  Patient Goals: Update on POC  Family Goals: Update onn POC    Progress made toward(s) clinical / shift goals:    Problem: Communication  Goal: The ability to communicate needs accurately and effectively will improve  Description: Target End Date:  End of day 1    1.  Assess ability to communicate and understand  2.  Provide augmentative or alternative methods of communication devices  3.  Use /language line as appropriate  4.  Collaborate with Speech Therapy as needed  Outcome: Progressing  Note: Plan of Care discussed, all questions answered. Pt effectively communicates needs to staff.      Problem: Nutrition  Goal: Patient's nutritional and fluid intake will be adequate or improve  Description: Target End Date:  Prior to discharge or change in level of care    Document on I/O flowsheet    1.  Monitor nutritional intake  2.  Monitor weight per provider order  3.  Assess patient's ability to take oral nutrition  4.  Collaborate with Speech Therapy, Dietitian and interdisciplinary team for appropriate feeding and fluid intake  5.  Assist with feeding  Outcome: Progressing       Patient is not progressing towards the following goals:

## 2024-02-29 NOTE — ANESTHESIA PROCEDURE NOTES
Airway    Date/Time: 2/29/2024 8:36 AM    Performed by: Jony Moser M.D.  Authorized by: Jony Moser M.D.    Location:  OR  Urgency:  Elective  Indications for Airway Management:  Anesthesia      Spontaneous Ventilation: absent    Sedation Level:  Deep  Preoxygenated: Yes    Patient Position:  Sniffing  Mask Difficulty Assessment:  2 - vent by mask + OA or adjuvant +/- NMBA  Final Airway Type:  Endotracheal airway  Final Endotracheal Airway:  ETT  Cuffed: Yes    Technique Used for Successful ETT Placement:  Direct laryngoscopy    Insertion Site:  Oral  Blade Type:  Glide  Laryngoscope Blade/Videolaryngoscope Blade Size:  3  ETT Size (mm):  7.0  Measured from:  Teeth  ETT to Teeth (cm):  21  Placement Verified by: auscultation and capnometry    Cormack-Lehane Classification:  Grade I - full view of glottis  Number of Attempts at Approach:  1  Ventilation Between Attempts:  BVM  Number of Other Approaches Attempted:  1  Unsuccessful Airway(s) Attempted:  Endotracheal tube  Unsuccessful Approach(es) for ETT:  Direct laryngoscopy   Grade 4 with Mac 3 due to limited MO, easy BMV and GS3

## 2024-02-29 NOTE — ANESTHESIA PROCEDURE NOTES
Peripheral IV    Date/Time: 2/29/2024 8:28 AM    Performed by: Jony Moser M.D.  Authorized by: Jony Moser M.D.    Size:  20 G  Peripheral IV Location:  Forearm  Local Anesthetic:  Lidocaine 1%  Site Prep:  Alcohol  Technique:  Direct puncture  Attempts:  1

## 2024-02-29 NOTE — ANESTHESIA TIME REPORT
Anesthesia Start and Stop Event Times       Date Time Event    2/29/2024 0815 Ready for Procedure     0819 Anesthesia Start     0940 Anesthesia Stop          Responsible Staff  02/29/24      Name Role Begin End    Jony Moser M.D. Anesth 0819 0940          Overtime Reason:  no overtime (within assigned shift)    Comments:

## 2024-03-01 ENCOUNTER — HOSPITAL ENCOUNTER (INPATIENT)
Facility: REHABILITATION | Age: 89
LOS: 9 days | DRG: 560 | End: 2024-03-10
Attending: PHYSICAL MEDICINE & REHABILITATION | Admitting: PHYSICAL MEDICINE & REHABILITATION
Payer: MEDICARE

## 2024-03-01 VITALS
TEMPERATURE: 98.1 F | HEART RATE: 67 BPM | OXYGEN SATURATION: 90 % | SYSTOLIC BLOOD PRESSURE: 133 MMHG | DIASTOLIC BLOOD PRESSURE: 52 MMHG | BODY MASS INDEX: 17.18 KG/M2 | RESPIRATION RATE: 16 BRPM | WEIGHT: 105.6 LBS

## 2024-03-01 DIAGNOSIS — E11.9 CONTROLLED TYPE 2 DIABETES MELLITUS WITHOUT COMPLICATION, WITHOUT LONG-TERM CURRENT USE OF INSULIN (HCC): ICD-10-CM

## 2024-03-01 DIAGNOSIS — I10 PRIMARY HYPERTENSION: ICD-10-CM

## 2024-03-01 DIAGNOSIS — S32.020A CLOSED COMPRESSION FRACTURE OF L2 LUMBAR VERTEBRA, INITIAL ENCOUNTER (HCC): ICD-10-CM

## 2024-03-01 DIAGNOSIS — S32.000A LUMBAR COMPRESSION FRACTURE, CLOSED, INITIAL ENCOUNTER (HCC): Primary | ICD-10-CM

## 2024-03-01 LAB
ANION GAP SERPL CALC-SCNC: 11 MMOL/L (ref 7–16)
BUN SERPL-MCNC: 16 MG/DL (ref 8–22)
CALCIUM SERPL-MCNC: 8.5 MG/DL (ref 8.5–10.5)
CHLORIDE SERPL-SCNC: 96 MMOL/L (ref 96–112)
CO2 SERPL-SCNC: 24 MMOL/L (ref 20–33)
CREAT SERPL-MCNC: 0.29 MG/DL (ref 0.5–1.4)
ERYTHROCYTE [DISTWIDTH] IN BLOOD BY AUTOMATED COUNT: 41.8 FL (ref 35.9–50)
GFR SERPLBLD CREATININE-BSD FMLA CKD-EPI: 102 ML/MIN/1.73 M 2
GLUCOSE BLD STRIP.AUTO-MCNC: 122 MG/DL (ref 65–99)
GLUCOSE BLD STRIP.AUTO-MCNC: 123 MG/DL (ref 65–99)
GLUCOSE BLD STRIP.AUTO-MCNC: 134 MG/DL (ref 65–99)
GLUCOSE BLD STRIP.AUTO-MCNC: 153 MG/DL (ref 65–99)
GLUCOSE BLD STRIP.AUTO-MCNC: 209 MG/DL (ref 65–99)
GLUCOSE SERPL-MCNC: 168 MG/DL (ref 65–99)
HCT VFR BLD AUTO: 30.8 % (ref 37–47)
HGB BLD-MCNC: 10.6 G/DL (ref 12–16)
MCH RBC QN AUTO: 31.5 PG (ref 27–33)
MCHC RBC AUTO-ENTMCNC: 34.4 G/DL (ref 32.2–35.5)
MCV RBC AUTO: 91.4 FL (ref 81.4–97.8)
PLATELET # BLD AUTO: 321 K/UL (ref 164–446)
PMV BLD AUTO: 8.1 FL (ref 9–12.9)
POTASSIUM SERPL-SCNC: 4.5 MMOL/L (ref 3.6–5.5)
RBC # BLD AUTO: 3.37 M/UL (ref 4.2–5.4)
SODIUM SERPL-SCNC: 131 MMOL/L (ref 135–145)
WBC # BLD AUTO: 11.3 K/UL (ref 4.8–10.8)

## 2024-03-01 PROCEDURE — 700102 HCHG RX REV CODE 250 W/ 637 OVERRIDE(OP): Performed by: PHYSICAL MEDICINE & REHABILITATION

## 2024-03-01 PROCEDURE — 700101 HCHG RX REV CODE 250: Performed by: INTERNAL MEDICINE

## 2024-03-01 PROCEDURE — A9270 NON-COVERED ITEM OR SERVICE: HCPCS | Performed by: PHYSICAL MEDICINE & REHABILITATION

## 2024-03-01 PROCEDURE — A9270 NON-COVERED ITEM OR SERVICE: HCPCS | Performed by: STUDENT IN AN ORGANIZED HEALTH CARE EDUCATION/TRAINING PROGRAM

## 2024-03-01 PROCEDURE — 80048 BASIC METABOLIC PNL TOTAL CA: CPT

## 2024-03-01 PROCEDURE — 94760 N-INVAS EAR/PLS OXIMETRY 1: CPT

## 2024-03-01 PROCEDURE — 700102 HCHG RX REV CODE 250 W/ 637 OVERRIDE(OP): Performed by: INTERNAL MEDICINE

## 2024-03-01 PROCEDURE — 99239 HOSP IP/OBS DSCHRG MGMT >30: CPT | Performed by: STUDENT IN AN ORGANIZED HEALTH CARE EDUCATION/TRAINING PROGRAM

## 2024-03-01 PROCEDURE — 770010 HCHG ROOM/CARE - REHAB SEMI PRIVAT*

## 2024-03-01 PROCEDURE — 97535 SELF CARE MNGMENT TRAINING: CPT

## 2024-03-01 PROCEDURE — 82962 GLUCOSE BLOOD TEST: CPT | Mod: 91

## 2024-03-01 PROCEDURE — 99223 1ST HOSP IP/OBS HIGH 75: CPT | Performed by: PHYSICAL MEDICINE & REHABILITATION

## 2024-03-01 PROCEDURE — 85027 COMPLETE CBC AUTOMATED: CPT

## 2024-03-01 PROCEDURE — 700102 HCHG RX REV CODE 250 W/ 637 OVERRIDE(OP): Performed by: STUDENT IN AN ORGANIZED HEALTH CARE EDUCATION/TRAINING PROGRAM

## 2024-03-01 PROCEDURE — 700111 HCHG RX REV CODE 636 W/ 250 OVERRIDE (IP): Performed by: PHYSICAL MEDICINE & REHABILITATION

## 2024-03-01 PROCEDURE — A9270 NON-COVERED ITEM OR SERVICE: HCPCS | Performed by: INTERNAL MEDICINE

## 2024-03-01 PROCEDURE — 97162 PT EVAL MOD COMPLEX 30 MIN: CPT

## 2024-03-01 PROCEDURE — 82962 GLUCOSE BLOOD TEST: CPT

## 2024-03-01 PROCEDURE — 36415 COLL VENOUS BLD VENIPUNCTURE: CPT

## 2024-03-01 RX ORDER — ACETAMINOPHEN 325 MG/1
650 TABLET ORAL EVERY 6 HOURS PRN
Status: CANCELLED | OUTPATIENT
Start: 2024-03-01

## 2024-03-01 RX ORDER — ALUMINA, MAGNESIA, AND SIMETHICONE 2400; 2400; 240 MG/30ML; MG/30ML; MG/30ML
20 SUSPENSION ORAL
Status: DISCONTINUED | OUTPATIENT
Start: 2024-03-01 | End: 2024-03-10 | Stop reason: HOSPADM

## 2024-03-01 RX ORDER — ROSUVASTATIN CALCIUM 10 MG/1
10 TABLET, COATED ORAL DAILY
Status: DISCONTINUED | OUTPATIENT
Start: 2024-03-02 | End: 2024-03-10 | Stop reason: HOSPADM

## 2024-03-01 RX ORDER — GABAPENTIN 100 MG/1
100 CAPSULE ORAL 3 TIMES DAILY
Status: CANCELLED | OUTPATIENT
Start: 2024-03-01

## 2024-03-01 RX ORDER — CIPROFLOXACIN 500 MG/1
500 TABLET, FILM COATED ORAL EVERY 12 HOURS
Status: DISCONTINUED | OUTPATIENT
Start: 2024-03-01 | End: 2024-03-04

## 2024-03-01 RX ORDER — ONDANSETRON 2 MG/ML
4 INJECTION INTRAMUSCULAR; INTRAVENOUS 4 TIMES DAILY PRN
Status: DISCONTINUED | OUTPATIENT
Start: 2024-03-01 | End: 2024-03-10 | Stop reason: HOSPADM

## 2024-03-01 RX ORDER — CIPROFLOXACIN 500 MG/1
500 TABLET, FILM COATED ORAL EVERY 12 HOURS
Status: CANCELLED | OUTPATIENT
Start: 2024-03-01 | End: 2024-03-05

## 2024-03-01 RX ORDER — OMEPRAZOLE 20 MG/1
20 CAPSULE, DELAYED RELEASE ORAL DAILY
Status: DISCONTINUED | OUTPATIENT
Start: 2024-03-02 | End: 2024-03-10 | Stop reason: HOSPADM

## 2024-03-01 RX ORDER — ENOXAPARIN SODIUM 100 MG/ML
30 INJECTION SUBCUTANEOUS DAILY
Status: DISCONTINUED | OUTPATIENT
Start: 2024-03-01 | End: 2024-03-10 | Stop reason: HOSPADM

## 2024-03-01 RX ORDER — ENOXAPARIN SODIUM 100 MG/ML
30 INJECTION SUBCUTANEOUS DAILY
Status: CANCELLED | OUTPATIENT
Start: 2024-03-01

## 2024-03-01 RX ORDER — METHOCARBAMOL 500 MG/1
500 TABLET, FILM COATED ORAL 4 TIMES DAILY
Status: ON HOLD
Start: 2024-03-01 | End: 2024-03-09

## 2024-03-01 RX ORDER — OXYCODONE HYDROCHLORIDE 5 MG/1
5 TABLET ORAL EVERY 4 HOURS PRN
Status: CANCELLED | OUTPATIENT
Start: 2024-03-01

## 2024-03-01 RX ORDER — OXYCODONE HYDROCHLORIDE 5 MG/1
2.5 TABLET ORAL EVERY 4 HOURS PRN
Status: CANCELLED | OUTPATIENT
Start: 2024-03-01

## 2024-03-01 RX ORDER — CARBOXYMETHYLCELLULOSE SODIUM 5 MG/ML
1 SOLUTION/ DROPS OPHTHALMIC PRN
Status: DISCONTINUED | OUTPATIENT
Start: 2024-03-01 | End: 2024-03-10 | Stop reason: HOSPADM

## 2024-03-01 RX ORDER — AMLODIPINE BESYLATE 5 MG/1
5 TABLET ORAL DAILY
Status: DISCONTINUED | OUTPATIENT
Start: 2024-03-02 | End: 2024-03-03

## 2024-03-01 RX ORDER — POLYETHYLENE GLYCOL 3350 17 G/17G
1 POWDER, FOR SOLUTION ORAL 2 TIMES DAILY PRN
Status: DISCONTINUED | OUTPATIENT
Start: 2024-03-01 | End: 2024-03-10 | Stop reason: HOSPADM

## 2024-03-01 RX ORDER — LIDOCAINE 4 G/G
1 PATCH TOPICAL EVERY 24 HOURS
Status: CANCELLED | OUTPATIENT
Start: 2024-03-02

## 2024-03-01 RX ORDER — VITAMIN B COMPLEX
1000 TABLET ORAL DAILY
Status: CANCELLED | OUTPATIENT
Start: 2024-03-02

## 2024-03-01 RX ORDER — LIDOCAINE 4 G/G
1 PATCH TOPICAL EVERY 24 HOURS
Status: DISCONTINUED | OUTPATIENT
Start: 2024-03-02 | End: 2024-03-10 | Stop reason: HOSPADM

## 2024-03-01 RX ORDER — HYDRALAZINE HYDROCHLORIDE 25 MG/1
25 TABLET, FILM COATED ORAL EVERY 8 HOURS PRN
Status: DISCONTINUED | OUTPATIENT
Start: 2024-03-01 | End: 2024-03-10 | Stop reason: HOSPADM

## 2024-03-01 RX ORDER — OXYCODONE HYDROCHLORIDE 5 MG/1
5 TABLET ORAL EVERY 4 HOURS PRN
Status: DISCONTINUED | OUTPATIENT
Start: 2024-03-01 | End: 2024-03-10 | Stop reason: HOSPADM

## 2024-03-01 RX ORDER — DEXTROSE MONOHYDRATE 25 G/50ML
25 INJECTION, SOLUTION INTRAVENOUS
Status: DISCONTINUED | OUTPATIENT
Start: 2024-03-01 | End: 2024-03-02

## 2024-03-01 RX ORDER — IRBESARTAN 150 MG/1
300 TABLET ORAL DAILY
Status: CANCELLED | OUTPATIENT
Start: 2024-03-02

## 2024-03-01 RX ORDER — METHOCARBAMOL 500 MG/1
500 TABLET, FILM COATED ORAL 4 TIMES DAILY
Status: CANCELLED | OUTPATIENT
Start: 2024-03-01

## 2024-03-01 RX ORDER — ONDANSETRON 4 MG/1
4 TABLET, ORALLY DISINTEGRATING ORAL 4 TIMES DAILY PRN
Status: DISCONTINUED | OUTPATIENT
Start: 2024-03-01 | End: 2024-03-10 | Stop reason: HOSPADM

## 2024-03-01 RX ORDER — POLYETHYLENE GLYCOL 3350 17 G/17G
1 POWDER, FOR SOLUTION ORAL 2 TIMES DAILY
Status: CANCELLED | OUTPATIENT
Start: 2024-03-01

## 2024-03-01 RX ORDER — OXYCODONE HYDROCHLORIDE 5 MG/1
2.5 TABLET ORAL EVERY 4 HOURS PRN
Status: DISCONTINUED | OUTPATIENT
Start: 2024-03-01 | End: 2024-03-10 | Stop reason: HOSPADM

## 2024-03-01 RX ORDER — ECHINACEA PURPUREA EXTRACT 125 MG
2 TABLET ORAL PRN
Status: DISCONTINUED | OUTPATIENT
Start: 2024-03-01 | End: 2024-03-10 | Stop reason: HOSPADM

## 2024-03-01 RX ORDER — DEXTROSE MONOHYDRATE 25 G/50ML
25 INJECTION, SOLUTION INTRAVENOUS
Status: CANCELLED | OUTPATIENT
Start: 2024-03-01

## 2024-03-01 RX ORDER — AMLODIPINE BESYLATE 5 MG/1
5 TABLET ORAL DAILY
Status: CANCELLED | OUTPATIENT
Start: 2024-03-02

## 2024-03-01 RX ORDER — ROSUVASTATIN CALCIUM 10 MG/1
10 TABLET, COATED ORAL DAILY
Status: CANCELLED | OUTPATIENT
Start: 2024-03-02

## 2024-03-01 RX ORDER — METHOCARBAMOL 500 MG/1
500 TABLET, FILM COATED ORAL 4 TIMES DAILY
Status: DISCONTINUED | OUTPATIENT
Start: 2024-03-01 | End: 2024-03-07

## 2024-03-01 RX ORDER — POLYETHYLENE GLYCOL 3350 17 G/17G
1 POWDER, FOR SOLUTION ORAL 2 TIMES DAILY
Status: DISCONTINUED | OUTPATIENT
Start: 2024-03-01 | End: 2024-03-01

## 2024-03-01 RX ORDER — TRAZODONE HYDROCHLORIDE 50 MG/1
50 TABLET ORAL
Status: DISCONTINUED | OUTPATIENT
Start: 2024-03-01 | End: 2024-03-10 | Stop reason: HOSPADM

## 2024-03-01 RX ORDER — VITAMIN B COMPLEX
1000 TABLET ORAL DAILY
Status: DISCONTINUED | OUTPATIENT
Start: 2024-03-02 | End: 2024-03-10 | Stop reason: HOSPADM

## 2024-03-01 RX ORDER — IRBESARTAN 150 MG/1
300 TABLET ORAL DAILY
Status: DISCONTINUED | OUTPATIENT
Start: 2024-03-02 | End: 2024-03-10 | Stop reason: HOSPADM

## 2024-03-01 RX ORDER — GABAPENTIN 100 MG/1
100 CAPSULE ORAL 3 TIMES DAILY
Status: DISCONTINUED | OUTPATIENT
Start: 2024-03-01 | End: 2024-03-10 | Stop reason: HOSPADM

## 2024-03-01 RX ORDER — ACETAMINOPHEN 325 MG/1
650 TABLET ORAL EVERY 6 HOURS PRN
Status: DISCONTINUED | OUTPATIENT
Start: 2024-03-01 | End: 2024-03-10 | Stop reason: HOSPADM

## 2024-03-01 RX ORDER — CIPROFLOXACIN 500 MG/1
500 TABLET, FILM COATED ORAL EVERY 12 HOURS
Status: ON HOLD | DISCHARGE
Start: 2024-03-01 | End: 2024-03-09

## 2024-03-01 RX ADMIN — CIPROFLOXACIN 500 MG: 500 TABLET, FILM COATED ORAL at 04:30

## 2024-03-01 RX ADMIN — METHOCARBAMOL 500 MG: 500 TABLET ORAL at 09:43

## 2024-03-01 RX ADMIN — ROSUVASTATIN 10 MG: 10 TABLET, FILM COATED ORAL at 04:29

## 2024-03-01 RX ADMIN — INSULIN HUMAN 1 UNITS: 100 INJECTION, SOLUTION PARENTERAL at 09:46

## 2024-03-01 RX ADMIN — METHOCARBAMOL 500 MG: 500 TABLET ORAL at 17:09

## 2024-03-01 RX ADMIN — METHOCARBAMOL 500 MG: 500 TABLET ORAL at 12:47

## 2024-03-01 RX ADMIN — METHOCARBAMOL 500 MG: 500 TABLET ORAL at 20:29

## 2024-03-01 RX ADMIN — METFORMIN HYDROCHLORIDE 500 MG: 500 TABLET ORAL at 04:30

## 2024-03-01 RX ADMIN — AMLODIPINE BESYLATE 5 MG: 5 TABLET ORAL at 04:30

## 2024-03-01 RX ADMIN — ENOXAPARIN SODIUM 30 MG: 100 INJECTION SUBCUTANEOUS at 17:09

## 2024-03-01 RX ADMIN — GABAPENTIN 100 MG: 100 CAPSULE ORAL at 12:47

## 2024-03-01 RX ADMIN — GABAPENTIN 100 MG: 100 CAPSULE ORAL at 04:30

## 2024-03-01 RX ADMIN — IRBESARTAN 300 MG: 150 TABLET ORAL at 04:29

## 2024-03-01 RX ADMIN — Medication 1000 UNITS: at 04:30

## 2024-03-01 RX ADMIN — LIDOCAINE 1 PATCH: 4 PATCH TOPICAL at 04:30

## 2024-03-01 RX ADMIN — CIPROFLOXACIN 500 MG: 500 TABLET, FILM COATED ORAL at 20:29

## 2024-03-01 RX ADMIN — GABAPENTIN 100 MG: 100 CAPSULE ORAL at 20:29

## 2024-03-01 ASSESSMENT — COGNITIVE AND FUNCTIONAL STATUS - GENERAL
TURNING FROM BACK TO SIDE WHILE IN FLAT BAD: A LITTLE
MOBILITY SCORE: 18
STANDING UP FROM CHAIR USING ARMS: A LITTLE
MOVING FROM LYING ON BACK TO SITTING ON SIDE OF FLAT BED: A LITTLE
MOVING TO AND FROM BED TO CHAIR: A LITTLE
SUGGESTED CMS G CODE MODIFIER MOBILITY: CK
WALKING IN HOSPITAL ROOM: A LITTLE
CLIMB 3 TO 5 STEPS WITH RAILING: A LITTLE

## 2024-03-01 ASSESSMENT — LIFESTYLE VARIABLES
TOTAL SCORE: 0
TOTAL SCORE: 0
EVER FELT BAD OR GUILTY ABOUT YOUR DRINKING: NO
HAVE PEOPLE ANNOYED YOU BY CRITICIZING YOUR DRINKING: NO
AVERAGE NUMBER OF DAYS PER WEEK YOU HAVE A DRINK CONTAINING ALCOHOL: 1
ALCOHOL_USE: YES
HAVE YOU EVER FELT YOU SHOULD CUT DOWN ON YOUR DRINKING: NO
HOW MANY TIMES IN THE PAST YEAR HAVE YOU HAD 5 OR MORE DRINKS IN A DAY: 0
CONSUMPTION TOTAL: NEGATIVE
TOTAL SCORE: 0
EVER HAD A DRINK FIRST THING IN THE MORNING TO STEADY YOUR NERVES TO GET RID OF A HANGOVER: NO
ON A TYPICAL DAY WHEN YOU DRINK ALCOHOL HOW MANY DRINKS DO YOU HAVE: 1

## 2024-03-01 ASSESSMENT — PATIENT HEALTH QUESTIONNAIRE - PHQ9
SUM OF ALL RESPONSES TO PHQ9 QUESTIONS 1 AND 2: 0
2. FEELING DOWN, DEPRESSED, IRRITABLE, OR HOPELESS: NOT AT ALL
SUM OF ALL RESPONSES TO PHQ9 QUESTIONS 1 AND 2: 0
1. LITTLE INTEREST OR PLEASURE IN DOING THINGS: NOT AT ALL
2. FEELING DOWN, DEPRESSED, IRRITABLE, OR HOPELESS: NOT AT ALL
SUM OF ALL RESPONSES TO PHQ9 QUESTIONS 1 AND 2: 0
2. FEELING DOWN, DEPRESSED, IRRITABLE, OR HOPELESS: NOT AT ALL
1. LITTLE INTEREST OR PLEASURE IN DOING THINGS: NOT AT ALL
1. LITTLE INTEREST OR PLEASURE IN DOING THINGS: NOT AT ALL

## 2024-03-01 ASSESSMENT — FIBROSIS 4 INDEX: FIB4 SCORE: 1.38

## 2024-03-01 ASSESSMENT — GAIT ASSESSMENTS
DISTANCE (FEET): 50
ASSISTIVE DEVICE: FRONT WHEEL WALKER
DEVIATION: BRADYKINETIC;DECREASED HEEL STRIKE;DECREASED TOE OFF
GAIT LEVEL OF ASSIST: MINIMAL ASSIST

## 2024-03-01 ASSESSMENT — PAIN DESCRIPTION - PAIN TYPE
TYPE: ACUTE PAIN;SURGICAL PAIN
TYPE: ACUTE PAIN;SURGICAL PAIN

## 2024-03-01 NOTE — DISCHARGE PLANNING
0836: Physiatry consult pended, awaiting post-op PT eval as appropriate. TCC will continue to follow.    1125: Per attending pt is medically cleared, forwarded PAS to Dr Pride to review for acceptance.    Contacted daughter Adriana to verify dc support. Pt was living at [facility], per daughter patient was only there 3 days prior to fall. Daughter reports she is not happy with the [facility] and patient will not be returning there. Patient was to have caregiver assistance every 2 hours, but reports the group home was not providing that service. Daughter has interviewed another facility, but has not made a decision yet.    Plan will be for patient to dc home with daughter and son-in-law if group home is not arranged prior to discharge. Daughter reports she was caring for the patient before she moved into [facility] and is comfortable proving 24/7 care, daughter is off work until the end of April. Daughter's home is single floor with 2 ESAU. Discussed expectations for IPR and answered questions. Acceptance pending PMR review, TCC will continue to follow.    1228: Patient accepted by Dr Pride at [facility]. Transport set for 1400/1430 GMT w/c, nurse report [id number]. Notified care team.

## 2024-03-01 NOTE — CARE PLAN
The patient is Stable - Low risk of patient condition declining or worsening    Shift Goals  Clinical Goals: pain control, safety, mobility  Patient Goals: rest and comfort  Family Goals:    Progress made toward(s) clinical / shift goals:      Patient is axo4, denies any numb/tingling/n/v/sob/chest pain. Pain is tolerable at the moment, but instructed patient to call RN if pain starts to increase. Patient verbalized understanding, she refused SCD, bed alarm on and place in lowest position, call light and belongings within reach. Dressing is CDI. Hourly rounds in place.    Problem: Skin Integrity  Goal: Skin integrity is maintained or improved  Outcome: Progressing   Kept patient dry, patient able to turn. Sacral and heel foam in place, has PW.    Patient is not progressing towards the following goals:

## 2024-03-01 NOTE — DISCHARGE PLANNING
"RN CM spoke with patient's daughter, Adriana, over the phone @ [phone]. Adriana stated that she has cancelled contract with [facility] and is currently touring other facilities in the area. RN CM discussed the possibility of [facility] accepting patient and Adriana is agreeable.   Writer then spoke with patient at bedside to discuss plan. Patient states she will do \"whatever my daughter wants me to do\". Writer educated on [facility]. Patient also agreeable.   Choice form for [facility] verbally signed by daughter, Adriana, and faxed to DPA to upload into media.   Pending review from Physiatrist.   1318: Accepted by [facility]. COBRA verbally signed by daughter and physician. Discharge packet with face sheet x2 handed to bedside RN.   GMT WC  at [id number]. Patient's daughter, Adriana, notified of transport time.   No further needs from case management.     Case Management Discharge Planning    Admission Date: 2/27/2024  GMLOS: 3.9  ALOS: 1    6-Clicks ADL Score: 21  6-Clicks Mobility Score: 18      Anticipated Discharge Dispo: Discharge Disposition: D/T to home under A care in anticipation of covered skilled care (06)  Discharge Address: [address] [place] [address], [address]  Discharge Contact Phone Number: [phone]    DME Needed: No    Action(s) Taken: Updated Provider/Nurse on Discharge Plan, Patient Conference, and Family Conference    Escalations Completed: Pending Discharge Destination    Medically Clear: Yes    Next Steps: Pending official acceptance and transfer to [facility] rehab    Barriers to Discharge: Pending Placement    Is the patient up for discharge tomorrow: Hopefully today        "

## 2024-03-01 NOTE — PROGRESS NOTES
"SHAHID post kyphoplasty note:    Kyphoplasty POD #1 - L2 compression fracture with 30% loss of height.      Procedure(s): L2 cementoplasty  Neuro interventional radiologist: Dr. Sawyer    Bandages to procedure site CDI.  Patient reports pain free while laying in bed.  Slight increase in pain with sitting upright in chair for greater than 45 minutes \"not bad though\".  PT/OT pending.    Post op care:  No strenuous activity including straining, bending, heavy lifting, or twisting for 1 to 2 weeks.  Do not lift anything greater than 7 pounds for 1 to 2 weeks.  No driving for 48 hours.  Okay to remove bandage 24 to 48 hours after procedure.  Do not submerge procedure site in water for 7 days (bathtubs, hot tubs, etc.).  Okay to shower 48 hours after procedure and pat area dry. Use ice pack in 20-minute increments as needed to reduce pain and swelling at procedure site.    Follow-up with neurosurgeon and/or ordering provider as directed.    SHAHID signing off.           "

## 2024-03-01 NOTE — H&P
Physical Medicine & Rehabilitation  History and Physical (H&P)  &     Post Admission Physician Evaluation (MARCELLA)       Date of Admission: 3/1/2024   Date of Service: 3/1/2024   Mariana Jett  Room/bed 22/1    [facility] Code to Support Admission: 0008.9 - Orthopaedic Disorders: Other Orthopaedic  Etiologic Diagnosis: Closed compression fracture of L2 lumbar vertebra, initial encounter ([facility])    _______________________________________________    Chief Complaint: Compression fracture    History of Present Illness:    Mariana Jett is an 89-year-old female who has a past medical history significant for falls s/p kyphoplasties, hypertension, hyperlipidemia, type 2 diabetes mellitus who was residing at her assisted living facility when she fell trying to hang her clothes 2/25/2024.  She developed back pain and subsequently became more unable to ambulate and then ultimately unable to get out of her bed.  Her daughter brought her into the emergency department.  She was found to have an L2 compression fracture.  She was set up for kyphoplasty.  She underwent kyphoplasty on 2/29/2024.  Her hospital course has also been complicated by urinary tract infection with elevated white count.  She continues on antibiotics.  Patient was functioning far below her baseline prior to kyphoplasty.  Afterwards she did make progress and went from a max assist x 2 people up to approximately min assist.  Of note, prior to kyphoplasty patient was having progressively worse pain and MRI of the lumbar spine was performed.  It showed bilateral moderate to severe foraminal stenosis at L4-5 and severe right and moderate left foraminal stenosis at L5-S1.  Case was discussed with orthospine, Dr. Chew who did not recommend neurosurgical intervention.    Patient deemed an appropriate candidate by PT/OT and was admitted to the acute rehab unit 3/1/2024.  On admission patient reports she is doing ok. States that her daughter says she will not be going back to her half-way.  Patient pooped this morning. She doesn't want any bowel medications.     Review of Systems:     14 point ROS reviewed and negative except as stated above.      Past Medical History:  Past Medical History:   Diagnosis Date    DMII (diabetes mellitus, type 2) (HCC)     High cholesterol     HTN (hypertension)        Past Surgical History:  No past surgical history on file.    Family History:  No family history on file.    Medications:  Current Facility-Administered Medications   Medication Dose    Respiratory Therapy Consult      Pharmacy Consult Request ...Pain Management Review 1 Each  1 Each    omeprazole (PriLOSEC) capsule 20 mg  20 mg    hydrALAZINE (Apresoline) tablet 25 mg  25 mg    carboxymethylcellulose (Refresh Tears) 0.5 % ophthalmic drops 1 Drop  1 Drop    benzocaine-menthol (Cepacol) lozenge 1 Lozenge  1 Lozenge    mag hydrox-al hydrox-simeth (Maalox Plus Es Or Mylanta Ds) suspension 20 mL  20 mL    ondansetron (Zofran ODT) dispertab 4 mg  4 mg    Or    ondansetron (Zofran) syringe/vial injection 4 mg  4 mg    traZODone (Desyrel) tablet 50 mg  50 mg    sodium chloride (Ocean) 0.65 % nasal spray 2 Spray  2 Spray    [START ON 3/2/2024] amLODIPine (Norvasc) tablet 5 mg  5 mg    ciprofloxacin (Cipro) tablet 500 mg  500 mg    enoxaparin (Lovenox) inj 30 mg  30 mg    gabapentin (Neurontin) capsule 100 mg  100 mg    insulin regular (HumuLIN R,NovoLIN R) injection  1-6 Units    And    dextrose 50% (D50W) injection 25 g  25 g    [START ON 3/2/2024] irbesartan (Avapro) tablet 300 mg  300 mg    [START ON 3/2/2024] lidocaine (Asperflex) 4 % patch 1 Patch  1 Patch    [START ON 3/2/2024] metFORMIN (Glucophage) tablet 500 mg  500 mg    methocarbamol (Robaxin) tablet 500 mg  500 mg    [START ON 3/2/2024] rosuvastatin (Crestor) tablet 10 mg  10 mg    [START ON 3/2/2024] vitamin D3 (Cholecalciferol) tablet 1,000 Units  1,000 Units    acetaminophen (Tylenol) tablet 650 mg  650 mg    oxyCODONE immediate-release (Roxicodone)  "tablet 2.5 mg  2.5 mg    Or    oxyCODONE immediate-release (Roxicodone) tablet 5 mg  5 mg    polyethylene glycol/lytes (Miralax) Packet 1 Packet  1 Packet       Allergies:  Patient has no known allergies.    Psychosocial History:  Housing / Facility: Assisted Living Residence  Steps Into Home: 0  Steps In Home: 0  Lives with - Patient's Self Care Capacity: Attendant / Paid Care Giver  Equipment Owned: Front-Wheel Walker, 4-Wheel Walker, Single Point Cane    Level of Function Prior to Disability:  Housing / Facility: Assisted Living Residence  Steps Into Home: 0  Steps In Home: 0  Bathroom Set up: Walk In Shower, Grab Bars, Shower Chair  Equipment Owned: Front-Wheel Walker, 4-Wheel Walker, Single Point Cane  Lives with - Patient's Self Care Capacity: Attendant / Paid Care Giver  Bed Mobility: Independent  Transfer Status: Independent  Ambulation: Independent  Assistive Devices Used: 4-Wheel Walker    Self Feeding: Independent  Grooming / Hygiene: Independent  Bathing: Requires Assist (seated)  Dressing: Independent  Toileting: Independent  Laundry: Requires Assist  Finances: Requires Assist  Home Management: Requires Assist  Shopping: Requires Assist  Prior Level Of Mobility: Independent With Device in Home (uses 4WW \"most of the time\"; limited community mobility)  Driving / Transportation: Relatives / Others Provide Transportation  Prior Services: Housekeeping / Homemaker Services, Other (Comments) ([facility])  Housing / Facility: Assisted Living Residence  Leisure Interests: Other (Comments) (Art)    Level of Function Prior to Admission to [facility]:  Gait Level Of Assist: Minimal Assist  Assistive Device: Front Wheel Walker  Distance (Feet): 50  Deviation: Bradykinetic, Decreased Heel Strike, Decreased Toe Off  Supine to Sit: Minimal Assist  Sit to Supine:  (up to chair post)  Scooting: Minimal Assist  Rolling: Standby Assist  Comments: HOB 20 deg, use of rail, cues for log roll technique  Sit to " Stand: Minimal Assist  Bed, Chair, Wheelchair Transfer: Minimal Assist  Toilet Transfers: Standby Assist  Transfer Method: Stand Pivot (FWW)  Sitting in Chair: >20 min; up post  Sitting Edge of Bed: 3 min  Standin min    Lower Body Dressing: Moderate Assist (doff/don B socks using AE (Mepilex to heels negatively impacts); unable to tailor sit)  Toileting: Contact Guard Assist (BM and urination on BSC)    CURRENT LEVEL OF FUNCTION:   Same as level of function prior to admission to [facility]    Physical Examination:     VITAL SIGNS:   weight is 47.9 kg (105 lb 9.6 oz). Her oral temperature is 36.3 °C (97.4 °F). Her blood pressure is 139/64 and her pulse is 68. Her respiration is 17 and oxygen saturation is 92%.   GENERAL: No apparent distress  HEENT: Normocephalic/atraumatic and EOMI  CARDIAC: Regular rate and rhythm  LUNGS: Clear to auscultation   ABDOMINAL: soft and nontender    EXTREMITIES: no spasticity or no edema  NEURO:  Mental status:  A&Ox4 (person, place, date, situation) answers questions appropriately follows commands  Speech: fluent, no aphasia or dysarthria    Motor Exam Upper Extremities   ? Myotome R L   Shoulder Abduction C5 5 5   Elbow flexion C5 5 5   Wrist extension C6 5 5   Elbow extension C7 5 5   Finger flexion C8 5 5   Finger abduction T1 5 5     Motor Exam Lower Extremities  ? Myotome R L   Hip flexion L2 5 5   Knee extension L3 5 5   Ankle dorsiflexion L4 5 5   Toe extension L5 5 5   Ankle plantarflexion S1 5 5     Radiology:  MRI lumbar spine 2024    Acute compression fracture with bone marrow edema at L2 with superior endplate deformity causing approximately 30% loss of height and minimal posterior cortical retropulsion. There is mild to moderate canal narrowing at this level.     Small dorsal intradural CSF signal intensity collection likely representing a subdural collection within the thecal sac between L3 and L4. This displaces the cauda equina ventrally.      Advanced degenerative changes at multiple levels of the lumbar spine.     There is moderate right foraminal narrowing at L1-2 and bilateral moderate foraminal narrowing at L2-3.     At L4-5 there is bilateral moderate to severe foraminal narrowing.     At L5-S1 there is severe right and moderate left foraminal narrowing.    Laboratory Values:  Recent Labs     02/28/24 0452 02/29/24 0330 03/01/24 0226   SODIUM 132* 132* 131*   POTASSIUM 4.4 4.0 4.5   CHLORIDE 97 96 96   CO2 24 24 24   GLUCOSE 134* 125* 168*   BUN 15 13 16   CREATININE 0.21* 0.27* 0.29*   CALCIUM 8.4* 8.5 8.5     Recent Labs     02/28/24 0452 02/29/24 0330 03/01/24 0226   WBC 14.9* 13.0* 11.3*   RBC 4.02* 3.84* 3.37*   HEMOGLOBIN 12.9 12.1 10.6*   HEMATOCRIT 37.7 36.1* 30.8*   MCV 93.8 94.0 91.4   MCH 32.1 31.5 31.5   MCHC 34.2 33.5 34.4   RDW 44.9 44.0 41.8   PLATELETCT 329 319 321   MPV 7.9* 8.0* 8.1*           Primary Rehabilitation Diagnosis:    This patient is a 89 y.o. female admitted for acute inpatient rehabilitation with Closed compression fracture of L2 lumbar vertebra, initial encounter ([facility]).    Impairments:   ADLs/IADLs  Mobility    Secondary Diagnosis/Medical Co-morbidities Affecting Function  Post traumatic pain, hypertension, thrombocytosis, elevated LFTs, hyperlipidemia, diabetes mellitus type 2     Relevant Changes Since Preadmission Evaluation:    Status unchanged    The patient's rehabilitation potential is Excellent  The patient's medical prognosis is excellent    Rehabilitation Plan:   Discussion and Recommendations:   1. The patient requires an acute inpatient rehabilitation program with a coordinated program of care at an intensity and frequency not available at a lower level of care. This recommendation is substantiated by the patient's medical physicians who recommend that the patient's intervention and assessment of medical issues needs to be done at an acute level of care for patient's safety and maximum outcome.    2. A coordinated program of care will be supplied by an interdisciplinary team of physical therapy, occupational therapy, rehab physician, rehab nursing, and, if needed, speech therapy and rehab psychology. Rehab team presents a patient-specific rehabilitation and education program concentrating on prevention of future problems related to accessibility, mobility, skin, bowel, bladder, sexuality, and psychosocial and medical/surgical problems.   3. Need for Rehabilitation Physician: The rehab physician will be evaluating the patient on a multi-weekly basis to help coordinate the program of care. The rehab physician communicates between medical physicians, therapists, and nurses to maximize the patient's potential outcome. Specific areas in which the rehab physician will be providing daily assessment include the following:   A. Assessing the patient's heart rate and blood pressure response (vitals monitoring) to activity and making adjustments in medications or conservative measures as needed.   B. The rehab physician will be assessing the frequency at which the program can be increased to allow the patient to reach optimal functional outcome.   C. The rehab physician will also provide assessments in daily skin care, especially in light of patient's impairments in mobility.   D. The rehab physician will provide special expertise in understanding how to work with functional impairment and recommend appropriate interventions, compensatory techniques, and education that will facilitate the patient's outcome.   4. Rehab R.N.   The rehab RN will be working with patient to carry over in room mobility and activities of daily living when the patient is not in 3 hours of skilled therapy. Rehab nursing will be working in conjunction with rehab physician to address all the medical issues above and continue to assess laboratory work and discuss abnormalities with the treating physicians, assess vitals, and response to activity,  and discuss and report abnormalities with the rehab physician. Rehab RN will also continue daily skin care, supervise bladder/bowel program, instruct in medication administration, and ensure patient safety.   5. Rehab Therapy: Therapies to treat at intensity and frequency of (may change after completion of evaluation by all therapeutic disciplines):       PT:  Physical therapy to address mobility, transfer, gait training and evaluation for adaptive equipment needs 1.5 hour/day at least 5 days/week for the duration of the ELOS (see below)       OT:  Occupational therapy to address ADLs, self-care, home management training, functional mobility/transfers and assistive device evaluation, and community re-integration 1.5 hour/day at least 5 days/week for the duration of the ELOS (see below).        ST/Dysphagia:  Speech therapy to address speech, language, and cognitive deficits as well as swallowing difficulties with retraining/dysphagia management and community re-integration with comprehension, expression, cognitive training 0 hour/day at least 5 days/week for the duration of the ELOS (see below).     Medical management / Rehabilitation Issues/ Adverse Potential as part of rehabilitation plan     Rehabilitation Issues/Adverse Potential  1.  L2 compression fracture: Patient demonstrates functional deficits in strength, balance, coordination, and ADL's. Patient is admitted to [facility] for comprehensive rehabilitation therapy as described below.   Rehabilitation nursing monitors bowel and bladder control, educates on medication administration, co-morbidities and monitors patient safety.    2.  Neurostimulants: None at this time but continue to assess daily for need to initiate should status change.    3.  DVT prophylaxis:  Patient is on Lovenox for anticoagulation upon transfer. Encourage OOB. Monitor daily for signs and symptoms of DVT including but not limited to swelling and pain to prevent the  development of DVT that may interfere with therapies.    4.  GI prophylaxis:  On prilosec to prevent gastritis/dyspepsia which may interfere with therapies.    5.  Pain: Controlled with Tylenol and oxycodone    6.  Nutrition/Dysphagia: Dietician monitors nutrient intake, recommend supplements prn and provide nutrition education to pt/family to promote optimal nutrition for wound healing/recovery.     7.  Bladder/bowel:  Start bowel and bladder program as described below, to prevent constipation, urinary retention (which may lead to UTI), and urinary incontinence (which will impact upon pt's functional independence).   - TV Q3h while awake with post void bladder scans, I&O cath for PVRs >400  - up to commode after meal     8.  Skin/dermal ulcer prophylaxis: Monitor for new skin conditions with q.2 h. turns as required to prevent the development of skin breakdown.     9.  Cognition/Behavior: As needed psychologist provides adjustment counseling to illness and psychosocial barriers that may be potential barriers to rehabilitation.     10. Respiratory therapy: RT performs O2 management prn, breathing retraining, pulmonary hygiene and bronchospasm management prn to optimize participation in therapies.     Medical Co-Morbidities/Adverse Potential Affecting Function:    [facility]  S/p L2 Cementoplasty 2/29/2024 Dr. Sawyer  PT and OT for mobility and ADLs. Per guidelines, 15 hours per week between PT, OT and/or SLP.  Follow-up with PCP  Restrictions: No strenuous activity, heavy lifting, twisting for 1-2 weeks.  Do not lift anything greater than 7 pounds for 1-2 weeks.    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 Chew  Follow-up orthospine as an outpatient    Hyponatremia  Mild, monitor    Leukocytosis  Being treated for UTI, follow-up a.m. labs    Anemia  Drop from 12.1  down to 10.6 3/1    Thrombocytosis  Resolved prior to admission    Hypertension  Continue amlodipine 5 mg daily  Continue Avapro 300 mg daily    Hyperlipidemia  Continue rosuvastatin 10 mg nightly    Type 2 diabetes mellitus with hyperglycemia  Sliding scale insulin  Continue metformin 500 mg daily    Pain  As needed Tylenol  As needed oxycodone  As needed ice  Scheduled Robaxin 500 mg 4 times daily  Scheduled lidocaine patch every 24 hours  Scheduled gabapentin 100 mg 3 times daily    Skin  Patient at risk for skin breakdown due to debility in areas including sacrum, achilles, elbows and head in addition to other sites. Nursing to assess skin daily.     GI Ppx  Patient on Prilosec for GERD prophylaxis.     Bowel   Continue MiraLAX 1 packet twice daily PRN    Bladder  Pseudomonas UTI (onset prior to admission)  TV/PVR/BS PRN  Continue ciprofloxacin 500 mg twice daily for 5-day course      Upcoming Labs/imaging: Admission labs    DVT PROPHYLAXIS: Lovenox 30 mg subcutaneously nightly (adjusted for weight)    HOSPITALIST FOLLOWING: Yes, consulted on admission    CODE STATUS: DNR/DNI    DISPO: Home with spouse    JAKE: TBD    MEDS SENT TO: TBD    DISCHARGE SPECIALIST FOLLOW UP: Orthospine    Patient to scheduled follow up with their PCP within 2 weeks from discharge from the [facility].     I personally performed a complete drug regimen review and no potential clinically significant medication issues were identified.     Goals/Expected Level of Function Based on Current Medical and Functional Status:  (may change based on patient's medical status and rate of impairment recovery)  Transfers:   Supervision  Mobility/Gait:   Supervision  ADL's:   Supervision    DISPOSITION: Discharge to pre-morbid independent living setting with the supportive care of patient's spouse.    ELOS: 7-10 days  ____________________________________    Dr. Wendy Pride DO, MS  [facility] - Physical Medicine & Rehabilitation    ____________________________________    Pt was seen today for 75 min, and entire time spent in face-to-face contact was >50% in counseling and coordination of care as detailed in A/P above.

## 2024-03-01 NOTE — PROGRESS NOTES
All discharge education given by JATINDER Dewey. Any questions answered. PIV removed. All belongings gathered.

## 2024-03-01 NOTE — PREADMISSION SCREENING NOTE
"  Pre-Admission Screening Form    Patient Information:   Name: Mariana Jett     MRN: [id number]       : 1934      Age: 89 y.o.   Gender: female      Race: White [7]       Marital Status:  [5]  Family Contact: Adriana Wilkins        Relationship: Daughter [2]  Home Phone:            Cell Phone: [phone]  Advanced Directives: None  Code Status:  DNAR/DNI  Current Attending Provider: Irina Correa M.D.  Referring Physician: Dr. Correa       Referral Date: 24  Primary Payor Source:  MEDICARE  Secondary Payor Source:  [facility]    Medical Information:   Date of Admission to Acute Care [facility]  Room Number: T332/02  Rehabilitation Diagnosis: 0004.130 - Spinal Cord Dysfunction, Non-Traumatic: Other Non-Traumatic Spinal Cord Dysfunction    There is no immunization history on file for this patient.  No Known Allergies  Past Medical History:   Diagnosis Date    DMII (diabetes mellitus, type 2) (HCC)     High cholesterol     HTN (hypertension)      No past surgical history on file.    History Leading to Admission, Conditions that Caused the Need for Rehab (CMS):     Hospital Medicine History & Physical Note     Date of Service  2024     Primary Care Physician  Pcp Pt States None     Consultants  Interventional Radiology     Specialist Names: Elvin Castillo MD     Code Status  DNAR/DNI     Chief Complaint  Back Pain     History of Presenting Illness  As per chart review:  \"89 y.o. female who presented 2024 with severe back pain that has progressively gotten worse since her fall on Thursday.  Patient recently moved from [facility] to [place] and went right into assisted living.  She has been in assisted living for the past 5 days.  On Thursday she suffered a fall and landed on her back and since then she has been progressively getting worse and worse on her pain.  As her pain has elevated she has become increasingly more sedentary and is unable to walk at this point.  Patient does came in with " "her daughter to the emergency room.  She was evaluated and found to have an L2 compression fracture. She has had 2 kyphoplasty's previously. The patient also was found to have a white blood cell count of 16,600 and she tells me that she has been having suprapubic tenderness, frequency of urination and also some dysuria. Patient at this point will be evaluated urine analysis.\"     Patient remained hospitalized.  Patient continues with significant back pain.  I discussed case with interventional radiology with Dr. Castillo who recommended kyphoplasty.  Unfortunately we do not have kyphoplasty capabilities at [facility] so the patient will be transferred to [facility] for further management and care.  The patient has been started on ceftriaxone still pending urine and blood cultures.     The patient seen at bedside this morning.  The patient laying in bed, in seems to be in distress due to acute pain.  I was able to talk to the patient and the patient's daughter about transferring the patient to [facility] and they are agreeable.        I discussed the plan of care with patient, family, bedside RN, charge RN, , pharmacy, and IR .     Review of Systems  Constitutional:  Positive for malaise/fatigue. Negative for chills and fever.   HENT:  Negative for hearing loss and nosebleeds.    Eyes:  Negative for blurred vision and double vision.   Respiratory:  Negative for cough and shortness of breath.    Cardiovascular:  Negative for chest pain and palpitations.   Gastrointestinal:  Negative for abdominal pain, heartburn, nausea and vomiting.   Genitourinary:  Positive for dysuria and frequency.   Musculoskeletal:  Positive for back pain and falls.   Skin:  Negative for itching and rash.   Neurological:  Positive for weakness (generalized). Negative for dizziness and headaches.        Falls   Psychiatric/Behavioral:  Negative for substance abuse. The patient is not nervous/anxious.    All other systems reviewed " and are negative.     Past Medical History   has a past medical history of DMII (diabetes mellitus, type 2) (HCC), High cholesterol, and HTN (hypertension).     Surgical History  Previous kyphoplasty     Family History  Non contributory as per patient     Social History   reports that she has never smoked. She has never used smokeless tobacco. She reports that she does not currently use alcohol. She reports that she does not use drugs.     Allergies  No Known Allergies     Medications  Amlodipine  Ceftriaxone  Gabapentin  Insulin sliding scale  Irbesartan  Lidocaine patch  Methocarbamol  Rosuvastatin  Vitamin D3  Oxycodone        Physical Exam  Temp:  [35.9 °C (96.6 °F)-36.7 °C (98 °F)] 35.9 °C (96.6 °F)  Pulse:  [70-82] 77  Resp:  [18] 18  BP: (125-136)/(50-66) 131/53  SpO2:  [93 %-95 %] 95 %        Constitutional:       General: She is in acute distress (due to pain).      Appearance: She is normal weight. She is ill-appearing.   HENT:      Head: Normocephalic and atraumatic.      Nose: Nose normal.      Mouth/Throat:      Mouth: Mucous membranes are moist.   Eyes:      General:         Right eye: No discharge.         Left eye: No discharge.      Extraocular Movements: Extraocular movements intact.      Pupils: Pupils are equal, round, and reactive to light.   Cardiovascular:      Rate and Rhythm: Normal rate and regular rhythm.   Pulmonary:      Effort: Pulmonary effort is normal.      Breath sounds: Normal breath sounds.   Abdominal:      General: Bowel sounds are normal. There is no distension.      Palpations: Abdomen is soft.      Tenderness: There is no abdominal tenderness.   Musculoskeletal:         General: Tenderness present.      Comments: Patient lying in back, barely moving due to significant pain   Skin:     General: Skin is warm and dry.   Neurological:      General: No focal deficit present.      Mental Status: She is oriented to person, place, and time. Mental status is at baseline.  "  Psychiatric:         Mood and Affect: Mood normal.         Behavior: Behavior normal.         Laboratory:        Recent Labs     02/25/24  1607 02/26/24  0153 02/27/24  0505   WBC 16.6* 15.1* 15.4*   RBC 4.43 3.94* 3.76*   HEMOGLOBIN 14.3 12.7 12.1   HEMATOCRIT 41.3 37.0 35.3*   MCV 93.2 93.9 93.9   MCH 32.3 32.2 32.2   MCHC 34.6 34.3 34.3   RDW 43.5 43.8 44.2   PLATELETCT 481* 395 361   MPV 8.0* 7.7* 7.7*            Recent Labs     02/25/24  1607 02/26/24  0153 02/27/24  0505   SODIUM 133* 136 135   POTASSIUM 3.9 3.5* 4.1   CHLORIDE 96 101 98   CO2 21 21 27   GLUCOSE 108* 87 124*   BUN 19 17 21   CREATININE 0.37* 0.30* 0.36*   CALCIUM 8.7 8.1* 8.5            Recent Labs     02/25/24  1607 02/26/24  0153 02/27/24  0505   ALTSGPT 8  --  7   ASTSGOT 13  --  11*   ALKPHOSPHAT 143*  --  124*   TBILIRUBIN 0.7  --  0.5   GAMMAGT 19  --   --    GLUCOSE 108* 87 124*          Recent Labs     02/25/24  1607   INR 1.05      No results for input(s): \"NTPROBNP\" in the last 72 hours.      No results for input(s): \"TROPONINT\" in the last 72 hours.     Imaging:  DX-CHEST-PORTABLE (1 VIEW)   Final Result           1. No acute cardiopulmonary abnormalities are identified.       CT-PELVIS W/O PLUS RECONS   Final Result       No acute fracture detected.       CT-LSPINE W/O PLUS RECONS   Final Result       1.  Acute L2 superior endplate compression fracture.   2.  Additional findings as above.               Assessment/Plan:  * Closed compression fracture of L2 lumbar vertebra, initial encounter ([facility])- (present on admission)  Assessment & Plan  Patient fell at assisted living on Thursday  She just moved into assisted living 5 days ago from [facility].  The patient's fall resulted initially in minimal pain but over time the patient's pain became excruciating.  Patient is now found to have a L2 compression fracture which is acute.  Initiate pain management, muscle relaxant, Lidoderm patch ,PT OT -Will likely need to go to SNF as " she has been falling.  Can get kyphoplasty outpatient at [facility]-aspirin has been held but per radiology there is no timeframe that would prevent her from having the procedure as soon as it could be scheduled.     2/27: I discussed with Dr Castillo from  who recommends kyphoplasty, unfortunately it cannot be done at [facility] and the patient will be transferred to [facility].     UTI (urinary tract infection)  Assessment & Plan  We have started ceftriaxone  Pending cultures     Dyslipidemia- (present on admission)  Assessment & Plan  Low-fat low-cholesterol diet  Continue with statin, Crestor 10 mg nightly     Diabetes mellitus type II, controlled (HCC)- (present on admission)  Assessment & Plan  Accu-Cheks with sliding scale coverage  Diabetic management  Continue metformin  Accu-Cheks with sliding scale coverage     2/27: Pending A1c           Hypertension- (present on admission)  Assessment & Plan  Optimize blood pressure management keep systolic blood pressure less than 140 diastolic under 90  Continue with Avapro 300 mg daily and Norvasc 5 mg daily  As needed labetalol     Thrombocytosis- (present on admission)  Assessment & Plan  Thrombocytosis of 481,000 on 2/25     --resolved     Elevated alkaline phosphatase level- (present on admission)  Assessment & Plan  Check a GGT level  Most likely from bone injury with her recurrent falls.  Patient apparently fell 2 weeks ago as well.     2/27: GGT within normal limits.  I suspect elevated alkaline phosphatase due to bone.  It seems to be decreasing.  Repeat CMP.     Hyponatremia- (present on admission)  Assessment & Plan  Mild hyponatremia on 2/25     --resolved     Leukocytosis- (present on admission)  Assessment & Plan  Next leukocytosis of 16,600  This is concerning for acute infection  Patient complains of dysuria and frequency of urination. straight cath urinalysis and culture have been ordered     2/27: We have started patient on ceftriaxone.      Advance care planning  Assessment & Plan  I discussed with patient for at least 16 minutes further goals of care.  This included CODE STATUS which included DNR/DNI and full code.  The patient would like to be DNR/DNI.  However we also discussed further treatment options.  At this time the patient would like to continue with full treatment options including invasive and noninvasive procedures.  The patient will be transferred to [facility] for kyphoplasty.           VTE prophylaxis: SCDs/TEDs        I spend at least 76 minutes providing care for this patient.  This included face-to-face interview, physical examination.  Discussion with daughter over the phone.  Review of lab work including CBC, CMP.  Review of imaging study including CT scan.  Consulting and discussing with interventional radiology.  Discussing with multidisciplinary team including case management, nursing staff and pharmacy.  Creating plan of care, reviewing orders.     Moreover, I spend at least 16 minutes discussing further goals of care.  This included CODE STATUS including DNR/DNI and full code.  The patient would like to be DNR/DNI.  However we also discussed treatment options.  And at this point the patient would like to continue with full treatment options including invasive and noninvasive procedures.  This is why the patient will be transferred to [facility] for kyphoplasty.        Radiology Consult  Author: DIPAK Haro Date & Time created: 2/28/2024  9:01 AM   Date of admission  2/27/2024  Note to reader: this note follows the APSO format rather than the historical SOAP format. Assessment and plan located at the top of the note for ease of use.     Chief Complaint  89 y.o. female admitted 2/27/2024 with acute lower back pain, difficulty ambulating      HPI  Mariana Jett is a 89-year-old female with past medical history of DMII, hyperlipidemia, HTN, previous kyphoplasty, living at an assisted living center who presented to [facility] on 02/25/2024 complaining of severe back pain after a ground-level fall landing on her back on 02/22/2024.  In the ED a CT spine showed  superior L2 vertebral body fractures, several fracture lines, with mild height loss and minimal retropulsion of the posterior superior cortex of the L2 vertebral body by 2 mm.   Unfortunately [facility] does not have the resources to perform kyphoplasty, therefore she was transferred to [facility] on 02/27/2028 for interventional radiology consult, evaluation for possible kyphoplasty.   I have reviewed 's history and imaging studies with Dr. Sawyer. Ms. Jett is  a candidate for vertebral augmentation. The acute pain she describes appears to be directly related to the fracture. I  discussed the method of the procedure with her as well as the risks, including bleeding, infection, reaction to the moderate sedation medications, and cement extravasation causing compression of the spinal canal or embolus and subsequent interventions. We discussed alternatives of the procedure including conservative medical management. The patient verbalizes understanding and states that she has had two kyphoplasty procedures in the past. She agrees to Kyphoplasty and elects to proceed.          Assessment/Plan   Interval History   Principal Problem:    Closed compression fracture of L2 lumbar vertebra, initial encounter ([facility])  Active Problems:    Leukocytosis    Hyponatremia    Elevated alkaline phosphatase level    Thrombocytosis    Hypertension    Diabetes mellitus type II, controlled ([facility])    Dyslipidemia    UTI (urinary tract infection)    Advance care planning    Lumbar compression fracture, closed, initial encounter ([facility])        Plan IR     - NPO at midnight tonight   - I have ordered an MRI of spine to be done today for OR tomorrow.   - Hold anticoagulants prior to procedure per guidelines  -post op care:  -PT/OT  -No  strenuous activity including straining, bending, heavy lifting, or twisting for 1 to 2 weeks.  Do not lift anything greater than 7 pounds for 1 to 2 weeks.  No driving for 48 hours.  Okay to remove bandage 24 to 48 hours after procedure.  Do not submerge procedure site in water for 7 days (bathtubs, hot tubs, etc.).  Okay to shower 48 hours after procedure and pat area dry. Use ice pack in 20-minute increments as needed to reduce pain and swelling at procedure site.  -Follow-up outpatient with general practitioner or neurosurgeon      Thank you for allowing Interventional Radiology team to participate in the patients care, if any additonal care or requests are needed in the future please do not hesitate call or place IR order      [id number]   IR:                Review of Systems   Physical Exam   Review of Systems   Constitutional:  Negative for chills and fever.   Respiratory:  Negative for cough, sputum production and shortness of breath.    Cardiovascular:  Negative for chest pain.   Gastrointestinal:  Negative for abdominal pain, nausea and vomiting.   Musculoskeletal:  Positive for back pain and myalgias.        Lower mid back pain with movement.  Extreme pain with coughing   Neurological:  Negative for weakness and headaches.           Vitals:     02/28/24 0752   BP: 138/67   Pulse: 74   Resp: 17   Temp: 36.5 °C (97.7 °F)   SpO2: 98%      Physical Exam  Vitals and nursing note reviewed.   HENT:      Head: Normocephalic and atraumatic.      Mouth/Throat:      Mouth: Mucous membranes are moist.      Pharynx: Oropharynx is clear.   Cardiovascular:      Rate and Rhythm: Normal rate and regular rhythm.      Pulses:           Radial pulses are 2+ on the right side and 2+ on the left side.        Dorsalis pedis pulses are 2+ on the right side and 2+ on the left side.   Pulmonary:      Effort: Pulmonary effort is normal.   Abdominal:      General: Abdomen is flat. There is no distension.      Palpations: Abdomen is soft.  "  Skin:     General: Skin is warm and dry.      Capillary Refill: Capillary refill takes less than 2 seconds.   Neurological:      Mental Status: She is alert and oriented to person, place, and time.      GCS: GCS eye subscore is 4. GCS verbal subscore is 5. GCS motor subscore is 6.      Comments: Moving all extrem spontaneously- antigravity  Sensation intact   Sharp lower mid back pain with coughing   Psychiatric:         Mood and Affect: Mood normal.         Speech: Speech normal.         Behavior: Behavior is cooperative.                Labs           Recent Labs     02/26/24  0153 02/27/24  0505 02/28/24  0452   WBC 15.1* 15.4* 14.9*   RBC 3.94* 3.76* 4.02*   HEMOGLOBIN 12.7 12.1 12.9   HEMATOCRIT 37.0 35.3* 37.7   MCV 93.9 93.9 93.8   MCH 32.2 32.2 32.1   MCHC 34.3 34.3 34.2   RDW 43.8 44.2 44.9   PLATELETCT 395 361 329   MPV 7.7* 7.7* 7.9*            Recent Labs     02/26/24  0153 02/27/24  0505 02/28/24  0452   SODIUM 136 135 132*   POTASSIUM 3.5* 4.1 4.4   CHLORIDE 101 98 97   CO2 21 27 24   GLUCOSE 87 124* 134*   BUN 17 21 15   CREATININE 0.30* 0.36* 0.21*   CALCIUM 8.1* 8.5 8.4*      MR-LUMBAR SPINE-W/O    (Results Pending)   IR-KYPHOPLASTY,1 VERTEBRA,LUMBAR    (Results Pending)           Recent Labs     02/26/24 0153 02/27/24  0505 02/28/24 0452   SODIUM 136 135 132*   POTASSIUM 3.5* 4.1 4.4   CHLORIDE 101 98 97   CO2 21 27 24   GLUCOSE 87 124* 134*   BUN 17 21 15              INR   Date Value Ref Range Status   02/25/2024 1.05 0.87 - 1.13 Final       Comment:       Reference range:  INR - Non-therapeutic Reference Range: 0.87-1.13  INR - Therapeutic Reference Range: 2.0-4.0         No results found for: \"POCINR\"      Intake/Output Summary (Last 24 hours) at 2/28/2024 0901  Last data filed at 2/28/2024 0000      Gross per 24 hour   Intake --   Output 50 ml   Net -50 ml      Labs not explicitly included in this progress note were reviewed by the author. Radiology/imaging not explicitly included in this " progress note was reviewed by the author.      Past Medical History        Past Medical History:   Diagnosis Date    DMII (diabetes mellitus, type 2) (HCC)      High cholesterol      HTN (hypertension)                     Home Medications    Medication Sig Taking? Last Dose Authorizing Provider   irbesartan (AVAPRO) 300 MG Tab Take 300 mg by mouth every day. Yes 2/26/2024 at 1200 Physician Outpatient   risedronate (ACTONEL) 35 MG Tab Take 35 mg by mouth every 7 days. On Monday Yes 2/26/2024 at 1200 Physician Outpatient   Cranberry 1000 MG Cap Take 1,000 mg by mouth every day. Yes 2/26/2024 at 1200 Physician Outpatient   aspirin 81 MG EC tablet Take 81 mg by mouth every day. Yes 2/26/2024 at 1200 Physician Outpatient   cholecalciferol (D3 5000) 5000 UNIT Cap Take 5,000 Units by mouth every day. Yes 2/26/2024 at 1200 Physician Outpatient   metFORMIN (GLUCOPHAGE) 500 MG Tab Take 500 mg by mouth every day. Yes 2/26/2024 at 1200 Physician Outpatient   rosuvastatin (CRESTOR) 10 MG Tab Take 10 mg by mouth every day. Yes 2/26/2024 at 1200 Physician Outpatient   amLODIPine (NORVASC) 5 MG Tab Take 5 mg by mouth every day. Yes 2/27/2024 at 1200 Physician Outpatient   Acetaminophen 500 MG Cap Take 500-1,000 mg by mouth 2 times a day as needed. Indications: Pain    at K Physician Outpatient         I have performed a physical exam and reviewed and updated ROS and Plan today (2/28/2024).      40 minutes in directly providing and coordinating care and extensive data review.  No time overlap and excludes procedures.     2/29/2024  MRI lumbar spine reviewed and shows an acute L2 fracture woth bone marrow edema. Plan t o proceed with cement augmentaion.   The indications, benefits, alternatives and risks to the procedure were discussed with the patient and  family. Risks discussed included but were not limited to cement leakage, neurological injury, bleeding, infection, stroke, injury to nearby structures, new temporary or  "permanent motor weakness, arterial or venous injury and rarely death. Plan to proceed with procedure under general anesthesia.        As the Staff Neurointerventional radiologist, I have reviewed the notes, discussed the findings and plan of management with the Nurse Practitioner and agree with the contents of the note.              Immediate Post- Operative Note           Findings: L2 compression fracture with 30% loss of height. Cementoplasty performed.     Procedure(s): L2 cementoplasty        Estimated Blood Loss: Less than 5 ml        Complications: None        2/29/2024     9:25 AM     JOSH Cooper post kyphoplasty note:     Kyphoplasty POD #1 - L2 compression fracture with 30% loss of height.      Procedure(s): L2 cementoplasty  Neuro interventional radiologist: Dr. Sawyer     Bandages to procedure site CDI.  Patient reports pain free while laying in bed.  Slight increase in pain with sitting upright in chair for greater than 45 minutes \"not bad though\".  PT/OT pending.     Post op care:  No strenuous activity including straining, bending, heavy lifting, or twisting for 1 to 2 weeks.  Do not lift anything greater than 7 pounds for 1 to 2 weeks.  No driving for 48 hours.  Okay to remove bandage 24 to 48 hours after procedure.  Do not submerge procedure site in water for 7 days (bathtubs, hot tubs, etc.).  Okay to shower 48 hours after procedure and pat area dry. Use ice pack in 20-minute increments as needed to reduce pain and swelling at procedure site.     Follow-up with neurosurgeon and/or ordering provider as directed.     SHAHID signing off.    Co-morbidities:  See PMH  Potential Risk - Complications: Cognitive Impairment, Deep Vein Thrombosis, Incontinence, Pain, Perceptual Impairment, Pneumonia, Pressure Ulcer, and Urinary Tract Infection  Level of Risk: High    Ongoing Medical Management Needed (Medical/Nursing Needs):   Patient Active Problem List    Diagnosis Date Noted    " Fall at home, initial encounter 02/29/2024    UTI (urinary tract infection) 02/27/2024    Advance care planning 02/27/2024    Lumbar compression fracture, closed, initial encounter ([facility]) 02/27/2024    Closed compression fracture of L2 lumbar vertebra, initial encounter ([facility]) 02/25/2024    Leukocytosis 02/25/2024    Hyponatremia 02/25/2024    Elevated alkaline phosphatase level 02/25/2024    Thrombocytosis 02/25/2024    Hypertension 02/25/2024    Diabetes mellitus type II, controlled ([facility]) 02/25/2024    Dyslipidemia 02/25/2024    DNR (do not resuscitate) 02/25/2024     Alert, forgetful  Current Vital Signs:   Temperature: 36.3 °C (97.3 °F) Pulse: 70 Respiration: 19 Blood Pressure : 102/88  Weight: 47.9 kg (105 lb 9.6 oz)    Pulse Oximetry: 89 % O2 (LPM): 0      Completed Laboratory Reports:  Recent Labs     02/28/24  0452 02/29/24  0330 03/01/24  0226   WBC 14.9* 13.0* 11.3*   HEMOGLOBIN 12.9 12.1 10.6*   HEMATOCRIT 37.7 36.1* 30.8*   PLATELETCT 329 319 321   SODIUM 132* 132* 131*   POTASSIUM 4.4 4.0 4.5   BUN 15 13 16   CREATININE 0.21* 0.27* 0.29*   ALBUMIN 3.3  --   --    GLUCOSE 134* 125* 168*     Additional Labs: Not Applicable    Prior Living Situation:   Housing / Facility: Assisted Living Residence  Steps Into Home: 0  Steps In Home: 0  Lives with - Patient's Self Care Capacity: Attendant / Paid Care Giver  Equipment Owned: Front-Wheel Walker, 4-Wheel Walker, Single Point Cane    Prior Level of Function / Living Situation:   Physical Therapy: Prior Services: Housekeeping / Homemaker Services, Other (Comments) ([facility])  Housing / Facility: Assisted Living Residence  Steps Into Home: 0  Steps In Home: 0  Bathroom Set up: Walk In Shower, Grab Bars, Shower Chair  Equipment Owned: Front-Wheel Walker, 4-Wheel Walker, Single Point Cane  Lives with - Patient's Self Care Capacity: Attendant / Paid Care Giver  Bed Mobility: Independent  Transfer Status: Independent  Ambulation: Independent  Assistive Devices Used: 4-Wheel  "Walker  Current Level of Function:   Gait Level Of Assist: Minimal Assist  Assistive Device: Front Wheel Walker  Distance (Feet): 50  Deviation: Bradykinetic, Decreased Heel Strike, Decreased Toe Off  Supine to Sit: Minimal Assist  Sit to Supine:  (up to chair post)  Scooting: Minimal Assist  Rolling: Standby Assist  Comments: HOB 20 deg, use of rail, cues for log roll technique  Sit to Stand: Minimal Assist  Bed, Chair, Wheelchair Transfer: Minimal Assist  Toilet Transfers: Standby Assist  Transfer Method: Stand Pivot (FWW)  Sitting in Chair: >20 min; up post  Sitting Edge of Bed: 3 min  Standin min  Occupational Therapy:   Self Feeding: Independent  Grooming / Hygiene: Independent  Bathing: Requires Assist (seated)  Dressing: Independent  Toileting: Independent  Laundry: Requires Assist  Finances: Requires Assist  Home Management: Requires Assist  Shopping: Requires Assist  Prior Level Of Mobility: Independent With Device in Home (uses 4WW \"most of the time\"; limited community mobility)  Driving / Transportation: Relatives / Others Provide Transportation  Prior Services: Housekeeping / Homemaker Services, Other (Comments) (assisted)  Housing / Facility: Assisted Living Residence  Leisure Interests: Other (Comments) (Art)  Current Level of Function:   Lower Body Dressing: Moderate Assist (doff/don B socks using AE (Mepilex to heels negatively impacts); unable to tailor sit)  Toileting: Contact Guard Assist (BM and urination on BSC)  Speech Language Pathology:      Rehabilitation Prognosis/Potential: Good  Estimated Length of Stay: 10-14 days    Nursing:      Incontinent female elinor    Scope/Intensity of Services Recommended:  Physical Therapy: 1.5 hr / day  5 days / week. Therapeutic Interventions Required: Maximize Endurance, Mobility, Strength, and Safety  Occupational Therapy: 1.5 hr / day 5 days / week. Therapeutic Interventions Required: Maximize Self Care, ADLs, IADLs, and Energy Conservation  Rehabilitation " Nursin/7. Therapeutic Interventions Required: Monitor Pain, Skin, Vital Signs, Intake and Output, Labs, Safety, and Family Training  Rehabilitation Physician: 3 - 5 days / week. Therapeutic Interventions Required: Medical Management    She requires 24-hour rehabilitation nursing to manage bowel and bladder function, skin care, surgical incision, nutrition and fluid intake, pain control, safety, medication management, and patient/family goals. In addition, rehabilitation nursing will reiterate and reinforce therapy skills and equipment use, including ADLs, as well as provide education to the patient and family. Mariana Jett is willing to participate in and is able to tolerate the proposed plan of care.    Rehabilitation Goals and Plan (Expected frequency & duration of treatment in the IRF):   Return to the Community and Minimal Assist Level of Care  Anticipated Date of Rehabilitation Admission: 3/1/24  Patient/Family oriented IRF level of care/facility/plan: Yes  Patient/Family willing to participate in IRF care/facility/plan: Yes  Patient able to tolerate IRF level of care proposed: Yes  Patient has potential to benefit IRF level of care proposed: Yes  Comments: Not Applicable    Special Needs or Precautions - Medical Necessity:  Safety Concerns/Precautions:  Fall Risk / High Risk for Falls and Balance  Pain Management  IV Site: Peripheral  Current Medications:    Current Facility-Administered Medications Ordered in Epic   Medication Dose Route Frequency Provider Last Rate Last Admin    enoxaparin (Lovenox) inj 30 mg  30 mg Subcutaneous DAILY AT 1800 Irina Correa M.D.   30 mg at 24 1708    ciprofloxacin (Cipro) tablet 500 mg  500 mg Oral Q12HRS Irina Correa M.D.   500 mg at 24 0430    amLODIPine (Norvasc) tablet 5 mg  5 mg Oral DAILY Canelo Santiago M.D.   5 mg at 24 0430    gabapentin (Neurontin) capsule 100 mg  100 mg Oral TID Canelo Santiago M.D.   100 mg at 24  0430    insulin regular (HumuLIN R,NovoLIN R) injection  1-6 Units Subcutaneous 4X/DAY ACHS Canelo Santiago M.D.   1 Units at 03/01/24 0946    And    dextrose 10 % BOLUS 25 g  25 g Intravenous Q15 MIN PRN Canelo Santiago M.D.        irbesartan (Avapro) tablet 300 mg  300 mg Oral DAILY Canelo Santiago M.D.   300 mg at 03/01/24 0429    lidocaine (Asperflex) 4 % patch 1 Patch  1 Patch Transdermal Q24HR Canelo Santiago M.D.   1 Patch at 03/01/24 0430    metFORMIN (Glucophage) tablet 500 mg  500 mg Oral DAILY Canelo Santiago M.D.   500 mg at 03/01/24 0430    methocarbamol (Robaxin) tablet 500 mg  500 mg Oral 4X/DAY Canelo Santiago M.D.   500 mg at 03/01/24 0943    polyethylene glycol/lytes (Miralax) Packet 1 Packet  1 Packet Oral BID Canelo Santiago M.D.   1 Packet at 02/28/24 0514    rosuvastatin (Crestor) tablet 10 mg  10 mg Oral DAILY Canelo Santiago M.D.   10 mg at 03/01/24 0429    vitamin D3 (Cholecalciferol) tablet 1,000 Units  1,000 Units Oral DAILY Canelo Santiago M.D.   1,000 Units at 03/01/24 0430    acetaminophen (Tylenol) tablet 650 mg  650 mg Oral Q6HRS PRN Canelo Santiago M.D.        labetalol (Normodyne/Trandate) injection 10 mg  10 mg Intravenous Q4HRS PRN Canelo Santiago M.D.        ondansetron (Zofran ODT) dispertab 4 mg  4 mg Oral Q4HRS PRN Canelo Santiago M.D.        ondansetron (Zofran) syringe/vial injection 4 mg  4 mg Intravenous Q4HRS PRN Canelo Santiago M.D.        oxyCODONE immediate-release (Roxicodone) tablet 2.5 mg  2.5 mg Oral Q3HRS PRN Canelo Santiago M.D.        Or    oxyCODONE immediate-release (Roxicodone) tablet 5 mg  5 mg Oral Q3HRS PRN Canelo Santiago M.D.   5 mg at 02/28/24 1402    Pharmacy Consult Request ...Pain Management Review 1 Each  1 Each Other PHARMACY TO DOSE Canelo Santiago M.D.         No current  Epic-ordered outpatient medications on file.     Diet:   DIET ORDERS (From admission to next 24h)       Start     Ordered    02/29/24 1734  Diet Order Diet: Consistent CHO (Diabetic)  ALL MEALS        Question:  Diet:  Answer:  Consistent CHO (Diabetic)    02/29/24 1734    02/27/24 2017  Supplements  ALL MEALS        Question Answer Comment   Which Supplement Ensure    Ensure: Ensure Plus Carton        02/27/24 2016                    Anticipated Discharge Destination / Patient/Family Goal:  Destination: Assisted Living Support System: Family  and Attendant  Anticipated home health services: OT, PT, and Nursing  Previously used HH service/ provider: Not Applicable  Anticipated DME Needs: [facility]  Outpatient Services: OT and PT  Alternative resources to address additional identified needs:   No future appointments.   Pre-Screen Completed: 3/1/2024 11:08 AM Natalie Tran

## 2024-03-01 NOTE — DISCHARGE SUMMARY
Discharge Summary    CHIEF COMPLAINT ON ADMISSION  No chief complaint on file.      Reason for Admission  Closed compression fracture of L2    Admission Date  2/27/2024     CODE STATUS  DNAR/DNI    HPI & HOSPITAL COURSE  89 y.o. female who presented 2/27/2024 as a transfer from [facility] for L2 compression fracture requiring kyphoplasty. Patient recently moved from [facility] to [place] and went right into assisted living.  She has been in assisted living for the past 5 days. Patient suffered a fall and landed on her back and since then she has been progressively getting worse and worse on her pain. She was evaluated and found to have an L2 compression fracture at [facility]. She is transferred here for Kyphoplasty.    Mri lumbar: Acute compression fracture with bone marrow edema at L2 with superior endplate deformity. Small dorsal intradural CSF signal intensity collection likely representing a subdural collection within the thecal sac between L3 and L4. This displaces the cauda equina ventrally. Moderate to severe foraminal narrowing L4-5 and L5-S1. Case was discussed with spine surgeon and no surgical intervention indicated. She underwent kyphoplasty 2/29 which has significantly improved her back pain. Patient was evaluated by PT/OT/PMR and rec post acute placement.     Patient also reported suprapubic tenderness, frequency of urination and also some dysuria. UA positive. Urine culture pseudomonas. She is started on Ciprofloxacin to complete the course. Qtc was checked prior starting Ciprofloxacin.      Therefore, she is discharged in good and stable condition to an inpatient rehabilitation hospital.    The patient met 2-midnight criteria for an inpatient stay at the time of discharge.      FOLLOW UP ITEMS POST DISCHARGE  PCP in one week    DISCHARGE DIAGNOSES  Principal Problem:    Closed compression fracture of L2 lumbar vertebra, initial encounter ([facility]) (POA: Yes)  Active Problems:    Leukocytosis (POA:  Yes)    Hyponatremia (POA: Yes)    Elevated alkaline phosphatase level (POA: Yes)    Thrombocytosis (POA: Yes)    Hypertension (POA: Yes)    Diabetes mellitus type II, controlled (HCC) (POA: Yes)    Dyslipidemia (POA: Yes)    UTI (urinary tract infection) (POA: Yes)    Advance care planning (POA: Yes)    Lumbar compression fracture, closed, initial encounter (HCC) (POA: Yes)    Fall at home, initial encounter (POA: Yes)  Resolved Problems:    * No resolved hospital problems. *      FOLLOW UP  No future appointments.  No follow-up provider specified.    MEDICATIONS ON DISCHARGE     Medication List        START taking these medications        Instructions   ciprofloxacin 500 MG Tabs  Commonly known as: Cipro   Take 1 Tablet by mouth every 12 hours for 4 days.  Dose: 500 mg     methocarbamol 500 MG Tabs  Commonly known as: Robaxin   Take 1 Tablet by mouth 4 times a day.  Dose: 500 mg            CONTINUE taking these medications        Instructions   Acetaminophen 500 MG Caps   Take 500-1,000 mg by mouth 2 times a day as needed. Indications: Pain  Dose: 500-1,000 mg     Actonel 35 MG Tabs  Generic drug: risedronate   Take 35 mg by mouth every 7 days. On Monday  Dose: 35 mg     amLODIPine 5 MG Tabs  Commonly known as: Norvasc   Take 5 mg by mouth every day.  Dose: 5 mg     aspirin 81 MG EC tablet   Take 81 mg by mouth every day.  Dose: 81 mg     Cranberry 1000 MG Caps   Take 1,000 mg by mouth every day.  Dose: 1,000 mg     D3 5000 125 MCG (5000 UT) Caps  Generic drug: cholecalciferol   Take 5,000 Units by mouth every day.  Dose: 5,000 Units     irbesartan 300 MG Tabs  Commonly known as: Avapro   Take 300 mg by mouth every day.  Dose: 300 mg     metFORMIN 500 MG Tabs  Commonly known as: Glucophage   Take 500 mg by mouth every day.  Dose: 500 mg     rosuvastatin 10 MG Tabs  Commonly known as: Crestor   Take 10 mg by mouth every day.  Dose: 10 mg              Allergies  No Known Allergies    DIET  Orders Placed This  Encounter   Procedures    Diet Order Diet: Consistent CHO (Diabetic)     Standing Status:   Standing     Number of Occurrences:   1     Order Specific Question:   Diet:     Answer:   Consistent CHO (Diabetic) [4]       ACTIVITY  As tolerated.  Weight bearing as tolerated    LINES, DRAINS, AND WOUNDS  This is an automated list. Peripheral IVs will be removed prior to discharge.  Peripheral IV 02/29/24 (Active)       Peripheral IV 02/29/24 20 G Anterior;Right Forearm (Active)   Site Assessment Dry;Clean;Intact 02/29/24 2045   Dressing Type Transparent 02/29/24 2045   Line Status Saline locked;Scrubbed the hub prior to access;Flushed 02/29/24 2045   Dressing Status Clean;Intact;Dry 02/29/24 2045     External Urinary Catheter (Female Wick) (Active)   Collection Container Suction container 02/28/24 0815   Output (mL) 200 mL 02/29/24 0407      Wound 02/29/24 Other (comment) Back Lower;Medial Kyphoplasty site -Border gauze medipore tape (Active)   Site Assessment OSCAR 02/29/24 2045   Periwound Assessment OSCAR 02/29/24 2045   Margins OSCAR 02/29/24 2045   Dressing Status Clean;Intact;Dry 02/29/24 2045   Dressing Changed Observed 02/29/24 2045       Peripheral IV 02/29/24 (Active)       Peripheral IV 02/29/24 20 G Anterior;Right Forearm (Active)   Site Assessment Dry;Clean;Intact 02/29/24 2045   Dressing Type Transparent 02/29/24 2045   Line Status Saline locked;Scrubbed the hub prior to access;Flushed 02/29/24 2045   Dressing Status Clean;Intact;Dry 02/29/24 2045               MENTAL STATUS ON TRANSFER             CONSULTATIONS  IR    PROCEDURES  S/p kyphoplasty 2/29    LABORATORY  Lab Results   Component Value Date    SODIUM 131 (L) 03/01/2024    POTASSIUM 4.5 03/01/2024    CHLORIDE 96 03/01/2024    CO2 24 03/01/2024    GLUCOSE 168 (H) 03/01/2024    BUN 16 03/01/2024    CREATININE 0.29 (L) 03/01/2024        Lab Results   Component Value Date    WBC 11.3 (H) 03/01/2024    HEMOGLOBIN 10.6 (L) 03/01/2024    HEMATOCRIT 30.8 (L)  03/01/2024    PLATELETCT 321 03/01/2024      IR-KYPHOPLASTY,1 VERTEBRA,LUMBAR   Final Result         Transpedicular cement augmentation procedure for vertebral compression fracture at L2 through a left-sided approach with biplane fluoroscopic guidance for an osteoporotic insufficiency compression fracture.      MR-LUMBAR SPINE-W/O   Final Result         Acute compression fracture with bone marrow edema at L2 with superior endplate deformity causing approximately 30% loss of height and minimal posterior cortical retropulsion. There is mild to moderate canal narrowing at this level.      Small dorsal intradural CSF signal intensity collection likely representing a subdural collection within the thecal sac between L3 and L4. This displaces the cauda equina ventrally.      Advanced degenerative changes at multiple levels of the lumbar spine.      There is moderate right foraminal narrowing at L1-2 and bilateral moderate foraminal narrowing at L2-3.      At L4-5 there is bilateral moderate to severe foraminal narrowing.      At L5-S1 there is severe right and moderate left foraminal narrowing.          Total time of the discharge process 33 minutes.

## 2024-03-01 NOTE — PROGRESS NOTES
Bedside report received from JATIDNER Marie. Patient resting in bed. Call bell within reach, bed alarm on and in place. No further needs at this time. Placed on room air - satting 96%.

## 2024-03-01 NOTE — CARE PLAN
The patient is Stable - Low risk of patient condition declining or worsening    Shift Goals  Clinical Goals: patient will report good pain control by the end of shift  Patient Goals: feel better  Family Goals: updated on poc    Progress made toward(s) clinical / shift goals:  Reporting almost no pain - pain when sitting up for too long. OOB for all meals. Calling appropriately. Dressing c/d/I.     Problem: Skin Integrity  Goal: Skin integrity is maintained or improved  Outcome: Progressing     Problem: Communication  Goal: The ability to communicate needs accurately and effectively will improve  Outcome: Progressing     Problem: Mobility  Goal: Patient's capacity to carry out activities will improve  Outcome: Progressing       Patient is not progressing towards the following goals:

## 2024-03-02 ENCOUNTER — APPOINTMENT (OUTPATIENT)
Dept: OCCUPATIONAL THERAPY | Facility: REHABILITATION | Age: 89
DRG: 560 | End: 2024-03-02
Attending: PHYSICAL MEDICINE & REHABILITATION
Payer: MEDICARE

## 2024-03-02 ENCOUNTER — APPOINTMENT (OUTPATIENT)
Dept: PHYSICAL THERAPY | Facility: REHABILITATION | Age: 89
DRG: 560 | End: 2024-03-02
Attending: PHYSICAL MEDICINE & REHABILITATION
Payer: MEDICARE

## 2024-03-02 LAB
25(OH)D3 SERPL-MCNC: 61 NG/ML (ref 30–100)
ALBUMIN SERPL BCP-MCNC: 4.1 G/DL (ref 3.2–4.9)
ALBUMIN/GLOB SERPL: 1.5 G/DL
ALP SERPL-CCNC: 137 U/L (ref 30–99)
ALT SERPL-CCNC: 13 U/L (ref 2–50)
ANION GAP SERPL CALC-SCNC: 13 MMOL/L (ref 7–16)
AST SERPL-CCNC: 19 U/L (ref 12–45)
BACTERIA BLD CULT: NORMAL
BACTERIA BLD CULT: NORMAL
BASOPHILS # BLD AUTO: 0.6 % (ref 0–1.8)
BASOPHILS # BLD: 0.05 K/UL (ref 0–0.12)
BILIRUB SERPL-MCNC: 0.5 MG/DL (ref 0.1–1.5)
BUN SERPL-MCNC: 11 MG/DL (ref 8–22)
CALCIUM ALBUM COR SERPL-MCNC: 9 MG/DL (ref 8.5–10.5)
CALCIUM SERPL-MCNC: 9.1 MG/DL (ref 8.5–10.5)
CHLORIDE SERPL-SCNC: 96 MMOL/L (ref 96–112)
CO2 SERPL-SCNC: 26 MMOL/L (ref 20–33)
CREAT SERPL-MCNC: 0.51 MG/DL (ref 0.5–1.4)
EOSINOPHIL # BLD AUTO: 0.14 K/UL (ref 0–0.51)
EOSINOPHIL NFR BLD: 1.6 % (ref 0–6.9)
ERYTHROCYTE [DISTWIDTH] IN BLOOD BY AUTOMATED COUNT: 44.4 FL (ref 35.9–50)
GFR SERPLBLD CREATININE-BSD FMLA CKD-EPI: 89 ML/MIN/1.73 M 2
GLOBULIN SER CALC-MCNC: 2.8 G/DL (ref 1.9–3.5)
GLUCOSE BLD STRIP.AUTO-MCNC: 111 MG/DL (ref 65–99)
GLUCOSE BLD STRIP.AUTO-MCNC: 118 MG/DL (ref 65–99)
GLUCOSE BLD STRIP.AUTO-MCNC: 127 MG/DL (ref 65–99)
GLUCOSE BLD STRIP.AUTO-MCNC: 142 MG/DL (ref 65–99)
GLUCOSE SERPL-MCNC: 116 MG/DL (ref 65–99)
HCT VFR BLD AUTO: 39.7 % (ref 37–47)
HGB BLD-MCNC: 13.1 G/DL (ref 12–16)
IMM GRANULOCYTES # BLD AUTO: 0.05 K/UL (ref 0–0.11)
IMM GRANULOCYTES NFR BLD AUTO: 0.6 % (ref 0–0.9)
LYMPHOCYTES # BLD AUTO: 2.32 K/UL (ref 1–4.8)
LYMPHOCYTES NFR BLD: 27.2 % (ref 22–41)
MAGNESIUM SERPL-MCNC: 2 MG/DL (ref 1.5–2.5)
MCH RBC QN AUTO: 31.3 PG (ref 27–33)
MCHC RBC AUTO-ENTMCNC: 33 G/DL (ref 32.2–35.5)
MCV RBC AUTO: 94.7 FL (ref 81.4–97.8)
MONOCYTES # BLD AUTO: 0.94 K/UL (ref 0–0.85)
MONOCYTES NFR BLD AUTO: 11 % (ref 0–13.4)
NEUTROPHILS # BLD AUTO: 5.03 K/UL (ref 1.82–7.42)
NEUTROPHILS NFR BLD: 59 % (ref 44–72)
NRBC # BLD AUTO: 0 K/UL
NRBC BLD-RTO: 0 /100 WBC (ref 0–0.2)
PHOSPHATE SERPL-MCNC: 2.8 MG/DL (ref 2.5–4.5)
PLATELET # BLD AUTO: 443 K/UL (ref 164–446)
PMV BLD AUTO: 8.1 FL (ref 9–12.9)
POTASSIUM SERPL-SCNC: 4.3 MMOL/L (ref 3.6–5.5)
PROT SERPL-MCNC: 6.9 G/DL (ref 6–8.2)
RBC # BLD AUTO: 4.19 M/UL (ref 4.2–5.4)
SIGNIFICANT IND 70042: NORMAL
SIGNIFICANT IND 70042: NORMAL
SITE SITE: NORMAL
SITE SITE: NORMAL
SODIUM SERPL-SCNC: 135 MMOL/L (ref 135–145)
SOURCE SOURCE: NORMAL
SOURCE SOURCE: NORMAL
TSH SERPL DL<=0.005 MIU/L-ACNC: 2.17 UIU/ML (ref 0.38–5.33)
WBC # BLD AUTO: 8.5 K/UL (ref 4.8–10.8)

## 2024-03-02 PROCEDURE — 97535 SELF CARE MNGMENT TRAINING: CPT

## 2024-03-02 PROCEDURE — 84443 ASSAY THYROID STIM HORMONE: CPT

## 2024-03-02 PROCEDURE — 97129 THER IVNTJ 1ST 15 MIN: CPT

## 2024-03-02 PROCEDURE — 97110 THERAPEUTIC EXERCISES: CPT

## 2024-03-02 PROCEDURE — 83735 ASSAY OF MAGNESIUM: CPT

## 2024-03-02 PROCEDURE — 80053 COMPREHEN METABOLIC PANEL: CPT

## 2024-03-02 PROCEDURE — 97112 NEUROMUSCULAR REEDUCATION: CPT

## 2024-03-02 PROCEDURE — A9270 NON-COVERED ITEM OR SERVICE: HCPCS | Performed by: PHYSICAL MEDICINE & REHABILITATION

## 2024-03-02 PROCEDURE — 82306 VITAMIN D 25 HYDROXY: CPT

## 2024-03-02 PROCEDURE — 700111 HCHG RX REV CODE 636 W/ 250 OVERRIDE (IP): Performed by: PHYSICAL MEDICINE & REHABILITATION

## 2024-03-02 PROCEDURE — 99222 1ST HOSP IP/OBS MODERATE 55: CPT | Performed by: HOSPITALIST

## 2024-03-02 PROCEDURE — 97130 THER IVNTJ EA ADDL 15 MIN: CPT

## 2024-03-02 PROCEDURE — 36415 COLL VENOUS BLD VENIPUNCTURE: CPT

## 2024-03-02 PROCEDURE — 99232 SBSQ HOSP IP/OBS MODERATE 35: CPT | Performed by: PHYSICAL MEDICINE & REHABILITATION

## 2024-03-02 PROCEDURE — 700102 HCHG RX REV CODE 250 W/ 637 OVERRIDE(OP): Performed by: PHYSICAL MEDICINE & REHABILITATION

## 2024-03-02 PROCEDURE — 97162 PT EVAL MOD COMPLEX 30 MIN: CPT

## 2024-03-02 PROCEDURE — 770010 HCHG ROOM/CARE - REHAB SEMI PRIVAT*

## 2024-03-02 PROCEDURE — 82962 GLUCOSE BLOOD TEST: CPT | Mod: 91

## 2024-03-02 PROCEDURE — 700101 HCHG RX REV CODE 250: Performed by: PHYSICAL MEDICINE & REHABILITATION

## 2024-03-02 PROCEDURE — 97116 GAIT TRAINING THERAPY: CPT

## 2024-03-02 PROCEDURE — 85025 COMPLETE CBC W/AUTO DIFF WBC: CPT

## 2024-03-02 PROCEDURE — 84100 ASSAY OF PHOSPHORUS: CPT

## 2024-03-02 PROCEDURE — 97166 OT EVAL MOD COMPLEX 45 MIN: CPT

## 2024-03-02 RX ORDER — DEXTROSE MONOHYDRATE 25 G/50ML
25 INJECTION, SOLUTION INTRAVENOUS
Status: DISCONTINUED | OUTPATIENT
Start: 2024-03-02 | End: 2024-03-03

## 2024-03-02 RX ADMIN — LIDOCAINE 1 PATCH: 4 PATCH TOPICAL at 06:03

## 2024-03-02 RX ADMIN — CIPROFLOXACIN 500 MG: 500 TABLET, FILM COATED ORAL at 08:23

## 2024-03-02 RX ADMIN — GABAPENTIN 100 MG: 100 CAPSULE ORAL at 14:36

## 2024-03-02 RX ADMIN — IRBESARTAN 300 MG: 150 TABLET ORAL at 06:04

## 2024-03-02 RX ADMIN — METHOCARBAMOL 500 MG: 500 TABLET ORAL at 14:36

## 2024-03-02 RX ADMIN — GABAPENTIN 100 MG: 100 CAPSULE ORAL at 21:27

## 2024-03-02 RX ADMIN — ENOXAPARIN SODIUM 30 MG: 100 INJECTION SUBCUTANEOUS at 17:31

## 2024-03-02 RX ADMIN — AMLODIPINE BESYLATE 5 MG: 5 TABLET ORAL at 06:04

## 2024-03-02 RX ADMIN — METHOCARBAMOL 500 MG: 500 TABLET ORAL at 21:27

## 2024-03-02 RX ADMIN — METHOCARBAMOL 500 MG: 500 TABLET ORAL at 17:31

## 2024-03-02 RX ADMIN — Medication 1000 UNITS: at 08:23

## 2024-03-02 RX ADMIN — METFORMIN HYDROCHLORIDE 500 MG: 500 TABLET ORAL at 08:23

## 2024-03-02 RX ADMIN — CIPROFLOXACIN 500 MG: 500 TABLET, FILM COATED ORAL at 21:27

## 2024-03-02 RX ADMIN — OMEPRAZOLE 20 MG: 20 CAPSULE, DELAYED RELEASE ORAL at 08:23

## 2024-03-02 RX ADMIN — GABAPENTIN 100 MG: 100 CAPSULE ORAL at 08:23

## 2024-03-02 RX ADMIN — METHOCARBAMOL 500 MG: 500 TABLET ORAL at 08:23

## 2024-03-02 RX ADMIN — ROSUVASTATIN CALCIUM 10 MG: 10 TABLET, COATED ORAL at 08:23

## 2024-03-02 ASSESSMENT — ACTIVITIES OF DAILY LIVING (ADL)
BED_CHAIR_WHEELCHAIR_TRANSFER_DESCRIPTION: ADAPTIVE EQUIPMENT;INCREASED TIME;SET-UP OF EQUIPMENT;SUPERVISION FOR SAFETY
TUB_SHOWER_TRANSFER_DESCRIPTION: INCREASED TIME
TOILETING: INDEPENDENT
BED_CHAIR_WHEELCHAIR_TRANSFER_DESCRIPTION: ADAPTIVE EQUIPMENT;INCREASED TIME;SUPERVISION FOR SAFETY
TOILET_TRANSFER_DESCRIPTION: GRAB BAR;INCREASED TIME
TOILETING_LEVEL_OF_ASSIST_DESCRIPTION: ASSIST TO PULL PANTS UP;ASSIST TO PULL PANTS DOWN;ASSIST FOR STANDING BALANCE;GRAB BAR;INCREASED TIME

## 2024-03-02 ASSESSMENT — BRIEF INTERVIEW FOR MENTAL STATUS (BIMS)
INITIAL REPETITION OF BED BLUE SOCK - FIRST ATTEMPT: 3
BIMS SUMMARY SCORE: 13
WHAT DAY OF THE WEEK IS IT: CORRECT
WHAT MONTH IS IT: ACCURATE WITHIN 5 DAYS
ASKED TO RECALL BLUE: YES, NO CUE REQUIRED
ASKED TO RECALL SOCK: NO, COULD NOT RECALL
WHAT YEAR IS IT: CORRECT
ASKED TO RECALL BED: YES, NO CUE REQUIRED

## 2024-03-02 ASSESSMENT — ENCOUNTER SYMPTOMS
FEVER: 0
NAUSEA: 0
SHORTNESS OF BREATH: 0
BACK PAIN: 1
EYES NEGATIVE: 1
COUGH: 0
CHILLS: 0
ABDOMINAL PAIN: 0
VOMITING: 0
POLYDIPSIA: 0
BRUISES/BLEEDS EASILY: 0
PALPITATIONS: 0

## 2024-03-02 ASSESSMENT — PAIN DESCRIPTION - PAIN TYPE: TYPE: ACUTE PAIN;SURGICAL PAIN

## 2024-03-02 ASSESSMENT — GAIT ASSESSMENTS
ASSISTIVE DEVICE: FRONT WHEEL WALKER
DISTANCE (FEET): 65
GAIT LEVEL OF ASSIST: CONTACT GUARD ASSIST
DISTANCE (FEET): 50
ASSISTIVE DEVICE: FRONT WHEEL WALKER
DEVIATION: TRENDELENBERG;DECREASED BASE OF SUPPORT;BRADYKINETIC;DECREASED HEEL STRIKE;DECREASED TOE OFF
DEVIATION: TRENDELENBERG;DECREASED BASE OF SUPPORT;BRADYKINETIC;DECREASED HEEL STRIKE
GAIT LEVEL OF ASSIST: CONTACT GUARD ASSIST

## 2024-03-02 NOTE — ASSESSMENT & PLAN NOTE
Hba1c: 6.4  Cont Metformin  Off accuchecks  Note: home meds include Metformin 500 mg daily usually, but pt reports she takes twice daily as needed if FSBS >150

## 2024-03-02 NOTE — THERAPY
"Occupational Therapy  Daily Treatment     Patient Name: Mariana Jett  Age:  89 y.o., Sex:  female  Medical Record #: [id number]  Today's Date: 3/2/2024     Precautions  Precautions: Fall Risk, Spinal / Back Precautions          Subjective    \"I don't need that stuff.\" Pt very resistant to AE for LBD/LBB and not wanting to use sock aid or LH sponge- pt issued reacher and dressing stick in room. Pt has reacher at home that she uses.      Objective       03/02/24 1331   OT Charge Group   OT Cognitive Skill Development First 15 Minutes (Units) 1   OT Cognitive Skill Development Additional 15 Minutes (Units) 1   OT Total Time Spent   OT Individual Total Time Spent (Mins) 30   Cognition    Level of Consciousness Alert   Interdisciplinary Plan of Care Collaboration   Patient Position at End of Therapy In Bed;Bed Alarm On;Call Light within Reach;Tray Table within Reach;Phone within Reach     SLUMS ([facility] Mental Status) Examination performed To screen individual to look for the presence of cognitive deficits or dementia. The SLUMS examines: attention, immediate recall, orientation, delayed recall with interference, numeric calculation and registration, memory: immediate recall with interference (time constraint), Registration and digit span, Visual spatial, Visual spatial and executive function, and Executive function plus extrapolation     Assessment    Pt tolerated session  fair. Pt very resistant to AE for LBD- issued pt reacher and dressing stick and left near window to use next session during dressing tasks. Pt has reacher at home. SLUMS performed with pt while sitting up in bed. Pt scored 22/30 on exam with deficits in Memory: immediate recall with interference (time constraint)- naming as many animals as possible in 1 minute; Delayed recall with interference- recalling objects given a couple minutes before; Registration and digit span- saying digits backwards; Visual spatial- drawing a clock and putting the " time in at 10 minutes to 11 oclock- pt was able to draw clock correctly but put time in incorrectly. Pt might benefit from SLP eval for cognitive deficits.     Strengths: Able to follow instructions, Effective communication skills, Good insight into deficits/needs, Independent prior level of function, Motivated for self care and independence, Pleasant and cooperative, Willingly participates in therapeutic activities  Barriers: Generalized weakness, Impaired balance, Impaired activity tolerance, Pain, Decreased endurance, Bladder incontinence    Plan    ADLs with use of AE as needed, standing bal/tolerance, pain mgmt, functional mobility, strengthening/endurance     Occupational Therapy Goals (Active)       Problem: Dressing       Dates: Start:  03/02/24         Goal: STG-Within one week, patient will dress LB       Dates: Start:  03/02/24       Description: At a Min A level with AE/AD PRN and ongoing adherence to spinal precautions.            Problem: IADL's       Dates: Start:  03/02/24         Goal: STG-Within one week, patient will access kitchen area       Dates: Start:  03/02/24       Description: At a SUP level with LRD and seated RB as needed.             Problem: OT Long Term Goals       Dates: Start:  03/02/24         Goal: LTG-By discharge, patient will complete basic self care tasks       Dates: Start:  03/02/24       Description: At a Min A to Mod I level with AE/AD/DME PRN.          Goal: LTG-By discharge, patient will perform bathroom transfers       Dates: Start:  03/02/24       Description: At a SUP to Mod I level with AE/AD/DME PRN.             Problem: Toileting       Dates: Start:  03/02/24         Goal: STG-Within one week, patient will complete toileting tasks       Dates: Start:  03/02/24       Description: a Min A level with AE/AD PRN and ongoing adherence to spinal precautions.

## 2024-03-02 NOTE — PROGRESS NOTES
4 Eyes Skin Assessment Completed by JATINDER Mulligan and JATINDER Lopez.    Head WDL  Ears WDL  Nose WDL  Mouth WDL  Neck WDL  Breast/Chest WDL  Shoulder Blades WDL  Spine Puncture site  (R) Arm/Elbow/Hand WDL  (L) Arm/Elbow/Hand WDL  Abdomen WDL  Groin WDL  Scrotum/Coccyx/Buttocks WDL  (R) Leg WDL  (L) Leg WDL  (R) Heel/Foot/Toe Redness  (L) Heel/Foot/Toe Redness          Devices In Places None      Interventions In Place Waffle Overlay    Possible Skin Injury Yes    Pictures Uploaded Into Epic Yes  Wound Consult Placed Yes  RN Wound Prevention Protocol Ordered Yes

## 2024-03-02 NOTE — THERAPY
"Occupational Therapy   Initial Evaluation     Patient Name: Mariana Jett  Age:  89 y.o., Sex:  female  Medical Record #: [id number]  Today's Date: 3/2/2024     Subjective    \"We just moved down to [place] a couple of weeks ago.  I was staying at [facility], but my daughter is taking me to her house.  The food was terrible.\"      Objective       03/02/24 0831   OT Charge Group   Charges Yes   OT Self Care / ADL (Units) 1   OT Evaluation OT Evaluation Mod   OT Total Time Spent   OT Individual Total Time Spent (Mins) 60   Prior Living Situation   Prior Services Continuous (24 Hour) Care Giving Per Service  (Assisted living facility)   Housing / Facility Assisted Living Residence   Steps Into Home 0  (Patient reports her daughter's home has 1-2 steps)   Steps In Home 0   Elevator Yes   Bathroom Set up Walk In Shower   Equipment Owned Front-Wheel Walker;4-Wheel Walker   Lives with - Patient's Self Care Capacity Unrelated Adult  (Patient residing on [facility] but reports she will be discharging to her daughter's home)   Prior Level of ADL Function   Self Feeding Independent   Grooming / Hygiene Independent   Bathing Independent   Dressing Independent   Toileting Independent   Comments Per patient report   Prior Level of IADL Function   Medication Management Requires Assist   Laundry Requires Assist   Kitchen Mobility Independent   Finances Requires Assist   Home Management Requires Assist   Shopping Requires Assist   Prior Level Of Mobility Independent With Device in Home   Driving / Transportation Relatives / Others Provide Transportation   Occupation (Pre-Hospital Vocational) Retired Due To Disability   Leisure Interests Family   Comments Patient reports that she [facility] living at an prison that she had moved into within the month from her home in [place], [place]   Prior Functioning: Everyday Activities   Self Care Needed some help   Indoor Mobility (Ambulation) Independent   Stairs Needed some help   Functional " "Cognition Needed some help   Prior Device Use Walker   Pain   Intervention Declines   Cognition    Cognition / Consciousness X   Level of Consciousness Alert   Comments Fair overall safety awareness and able to recall spinal precautions, incidental impulsivity with bathing activity.   Cognitive Pattern Assessment   Cognitive Pattern Assessment Used BIMS   Brief Interview for Mental Status (BIMS)   Repetition of Three Words (First Attempt) 3   Temporal Orientation: Year Correct   Temporal Orientation: Month Accurate within 5 days   Temporal Orientation: Day Correct   Recall: \"Sock\" No, could not recall  (\"shoe\")   Recall: \"Blue\" Yes, no cue required   Recall: \"Bed\" Yes, no cue required   BIMS Summary Score 13   Confusion Assessment Method (CAM)   Is there evidence of an acute change in mental status from the patient's baseline? No   Inattention Behavior not present   Disorganized thinking Behavior not present   Altered level of consciousness Behavior not present   Vision Screen   Vision Not tested  (Patient reporst that she has macular degeneration in both eyes and wears corrective lenses)   Balance Assessment   Sitting Balance (Static) Good   Sitting Balance (Dynamic) Good   Standing Balance (Static) Poor +   Standing Balance (Dynamic) Poor -   Weight Shift Sitting Good   Weight Shift Standing Poor   Bed Mobility    Supine to Sit Minimal Assist   Sit to Supine Minimal Assist   Sit to Stand Contact Guard Assist   Rolling Minimal Assist to Rt.;Minimum Assist to Lt.   Coordination Upper Body   Coordination WDL   Eating   Assistance Needed Set-up / clean-up   CARE Score - Eating 5   Eating Discharge Goal   Discharge Goal 6   Oral Hygiene   Assistance Needed Set-up / clean-up   CARE Score - Oral Hygiene 5   Oral Hygiene Discharge Goal   Discharge Goal 6   Shower/Bathe Self   Assistance Needed Physical assistance   Physical Assistance Level 25% or less   CARE Score - Shower/Bathe Self 3   Shower/Bathe Self Discharge Goal "   Discharge Goal 6   Upper Body Dressing   Assistance Needed Physical assistance   Physical Assistance Level 25% or less   CARE Score - Upper Body Dressing 3   Upper Body Dressing Discharge Goal   Discharge Goal 6   Lower Body Dressing   Assistance Needed Physical assistance   Physical Assistance Level 76% or more   CARE Score - Lower Body Dressing 2   Lower Body Dressing Discharge Goal   Discharge Goal 4   Putting On/Taking Off Footwear   Assistance Needed Physical assistance   Physical Assistance Level Total assistance   CARE Score - Putting On/Taking Off Footwear 1   Putting On/Taking Off Footwear Discharge Goal   Discharge Goal 4   Toileting Hygiene   Assistance Needed Physical assistance   Physical Assistance Level 25% or less   CARE Score - Toileting Hygiene 3   Toileting Hygiene Discharge Goal   Discharge Goal 6   Toilet Transfer   Assistance Needed Incidental touching   Physical Assistance Level 25% or less   CARE Score - Toilet Transfer 3   Toilet Transfer Discharge Goal   Discharge Goal 6   Hearing, Speech, and Vision   Ability to Hear Adequate   Ability to See in Adequate Light Impaired   Expression of Ideas and Wants Without difficulty   Understanding Verbal and Non-Verbal Content Understands   Functional Level of Assist   Eating Supervision   Eating Description Increased time;Set-up of equipment or meal/tube feeding;Supervision for safety   Grooming Standing;Standby Assist   Grooming Description Standing at sink   Bathing Minimal Assist   Bathing Description Grab bar;Hand held shower;Tub bench;Assit wtih lower extremities;Increased time   Upper Body Dressing Minimal Assist   Upper Body Dressing Description Assist with closures  (Assist with bra clasp; Mod I with sweater)   Lower Body Dressing Maximal Assist   Lower Body Dressing Description Assist with threading into pant leg;Increased time;Cues for spinal precautions   Toileting Contact Guard Assist   Toileting Description Assist to pull pants  "up;Assist to pull pants down;Assist for standing balance;Grab bar;Increased time   Bed, Chair, Wheelchair Transfer Contact Guard Assist   Bed Chair Wheelchair Transfer Description Adaptive equipment;Initial preparation for task;Increased time   Toilet Transfers Contact Guard Assist   Toilet Transfer Description Grab bar;Increased time   Tub / Shower Transfers Contact Guard Assist   Tub Shower Transfer Description Increased time   Precautions   Precautions Fall Risk   Current Discharge Plan   Current Discharge Plan Temporarily go to Family /  Friend's Home   Benefit    Therapy Benefit Patient Would Benefit from Inpatient Rehab Occupational Therapy to Maximize [place] with ADLs, IADLs and Functional Mobility.   Interdisciplinary Plan of Care Collaboration   IDT Collaboration with  Physical Therapist   Patient Position at End of Therapy In Bed;Phone within Reach;Call Light within Reach;Tray Table within Reach   Strengths & Barriers   Strengths Able to follow instructions;Effective communication skills;Good insight into deficits/needs;Independent prior level of function;Motivated for self care and independence;Pleasant and cooperative;Willingly participates in therapeutic activities   Barriers Generalized weakness;Impaired balance;Impaired activity tolerance;Pain;Decreased endurance;Bladder incontinence       Assessment  Patient is 89 y.o. female with a diagnosis of closed compression fracture of L2 lumbar vertebra.  Additional factors influencing patient status / progress (ie: cognitive factors, co-morbidities, social support, etc):  \"Mariana Jett is an 89-year-old female who has a past medical history significant for falls s/p kyphoplasties, hypertension, hyperlipidemia, type 2 diabetes mellitus who was residing at her assisted living facility when she fell trying to hang her clothes 2/25/2024.  She developed back pain and subsequently became more unable to ambulate and then ultimately unable to get out of her bed.  " "Her daughter brought her into the emergency department.  She was found to have an L2 compression fracture.  She was set up for kyphoplasty.  She underwent kyphoplasty on 2/29/2024.  Her hospital course has also been complicated by urinary tract infection with elevated white count.  She continues on antibiotics.  Patient was functioning far below her baseline prior to kyphoplasty.  Afterwards she did make progress and went from a max assist x 2 people up to approximately min assist.  Of note, prior to kyphoplasty patient was having progressively worse pain and MRI of the lumbar spine was performed.  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    Passport items to be completed:  Perform bathroom transfers, complete dressing, complete feeding, get ready for the day, prepare a simple meal, participate in household tasks, adapt home for safety needs, demonstrate home exercise program, complete caregiver training     Goals:  Long term and short term goals have been discussed with patient and they are in agreement.    Occupational Therapy Goals (Active)       Problem: Dressing       Dates: Start:  03/02/24         Goal: STG-Within one week, patient will dress LB       Dates: Start:  03/02/24       Description: At a Min A level with AE/AD PRN and ongoing adherence to spinal precautions.            Problem: IADL's       Dates: Start:  03/02/24         Goal: STG-Within one week, patient will access kitchen area       Dates: Start:  03/02/24       Description: At a SUP level with LRD and seated RB as needed.             Problem: OT Long Term Goals       Dates: " Start:  03/02/24         Goal: LTG-By discharge, patient will complete basic self care tasks       Dates: Start:  03/02/24       Description: At a Min A to Mod I level with AE/AD/DME PRN.          Goal: LTG-By discharge, patient will perform bathroom transfers       Dates: Start:  03/02/24       Description: At a SUP to Mod I level with AE/AD/DME PRN.             Problem: Toileting       Dates: Start:  03/02/24         Goal: STG-Within one week, patient will complete toileting tasks       Dates: Start:  03/02/24       Description: a Min A level with AE/AD PRN and ongoing adherence to spinal precautions.

## 2024-03-02 NOTE — THERAPY
"Physical Therapy   Daily Treatment     Patient Name: Mariana Jett  Age:  89 y.o., Sex:  female  Medical Record #: [id number]  Today's Date: 3/2/2024     Precautions  Precautions: Fall Risk, Spinal / Back Precautions     Subjective    Patient reports increased back \"soreness\" this afternoon but still willing to participate.      Objective       03/02/24 1501   PT Charge Group   PT Gait Training (Units) 1   PT Therapeutic Exercise (Units) 1   PT Total Time Spent   PT Individual Total Time Spent (Mins) 30   Gait Functional Level of Assist    Gait Level Of Assist Contact Guard Assist   Assistive Device Front Wheel Walker   Distance (Feet) 50   # of Times Distance was Traveled 2   Deviation Trendelenberg;Decreased Base Of Support;Bradykinetic;Decreased Heel Strike  (L hip IR, R trendelenberg)   Transfer Functional Level of Assist   Bed, Chair, Wheelchair Transfer Contact Guard Assist   Bed Chair Wheelchair Transfer Description Adaptive equipment;Increased time;Set-up of equipment;Supervision for safety  (Stand step with FWW)   Sitting Lower Body Exercises   Hip Abduction 3 sets of 15;Bilateral;Light Resistance Theraband   Hip Adduction 3 sets of 15;Bilateral  (isometric ball squeeze)   Bed Mobility    Sit to Supine Minimal Assist   Sit to Stand Contact Guard Assist   Neuro-Muscular Treatments   Comments   Standing balance tasks in // bars:  -static standing 2 x 15 seconds  -static standing in narrow LISETH 2 x 15 seconds  -static standing in narrow LISETH with eyes closed 2 x 10 seconds (min A for balance)  -static standing on Airex 2 x 10 seconds with min A for balance     Interdisciplinary Plan of Care Collaboration   Patient Position at End of Therapy In Bed;Bed Alarm On;Call Light within Reach;Phone within Reach;Tray Table within Reach         Assessment    Patient with good tolerance to activity despite reports of fatigue this afternoon. She demonstrates impaired standing balance, with nearly absent balance reactions with minor " LOB. She required some cuing for spinal precautions during bed mobility, but able to perform with min A.     Strengths: Able to follow instructions, Alert and oriented, Manages pain appropriately, Motivated for self care and independence, Pleasant and cooperative, Supportive family, Willingly participates in therapeutic activities  Barriers: Decreased endurance, Fatigue, Impaired activity tolerance, Impaired balance, Pain, Generalized weakness    Plan    Gait training with LRAD, balance training, stair training, overall activity tolerance/endurance    DME       Passport items to be completed:  Get in/out of bed safely, in/out of a vehicle, safely use mobility device, walk or wheel around home/community, navigate up and down stairs, show how to get up/down from the ground, ensure home is accessible, demonstrate HEP, complete caregiver training    Physical Therapy Problems (Active)       Problem: Balance       Dates: Start:  03/02/24         Goal: STG-Within one week, patient will maintain static standing balance with narrow LISETH and no UE support, for at least 30 seconds.        Dates: Start:  03/02/24               Problem: Mobility       Dates: Start:  03/02/24         Goal: STG-Within one week, patient will ambulate at least 100 feet with LRAD and CGA.        Dates: Start:  03/02/24            Goal: STG-Within one week, patient will ambulate up/down a curb with LRAD and min A.        Dates: Start:  03/02/24            Goal: STG-Within one week, patient will ascend and descend four to six stairs with rails and CGA.        Dates: Start:  03/02/24               Problem: PT-Long Term Goals       Dates: Start:  03/02/24         Goal: LTG-By discharge, patient will ambulate at least 150 feet with LRAD and supervision.        Dates: Start:  03/02/24            Goal: LTG-By discharge, patient will transfer one surface to another with LRAD and supervision.        Dates: Start:  03/02/24            Goal: LTG-By discharge,  patient will ambulate up/down 4-6 stairs with B rails and SBA.        Dates: Start:  03/02/24            Goal: LTG-By discharge, patient will transfer in/out of a car with LRAD and SBA.        Dates: Start:  03/02/24

## 2024-03-02 NOTE — FLOWSHEET NOTE
03/01/24 1744   Protocol Assessment   Initial Assessment Yes   Patient History   Pulmonary Diagnosis None   Procedures Relevant to Respiratory Status None   Home O2 No   Nocturnal CPAP No   Home Treatments/Frequency No   Protocol Pathways   Protocol Pathways None

## 2024-03-02 NOTE — PROGRESS NOTES
NURSING DAILY NOTE    Name: Mariana Jett   Date of Admission: 3/1/2024   Admitting Diagnosis: Closed compression fracture of L2 lumbar vertebra, initial encounter ([facility])  Attending Physician: Wendy Pride D.o.  Allergies: Patient has no known allergies.    Safety  Patient Assist     Patient Precautions     Precaution Comments     Bed Transfer Status     Toilet Transfer Status      Assistive Devices  Rails  Oxygen  None - Room Air  Diet/Therapeutic Dining  Current Diet Order   Procedures    Diet Order Diet: Consistent CHO (Diabetic)     Pill Administration  whole  Agitated Behavioral Scale     ABS Level of Severity       Fall Risk  Has the patient had a fall this admission?   No  Hanh Brennan Fall Risk Scoring  18, HIGH RISK  Fall Risk Safety Measures  bed alarm and chair alarm    Vitals  Temperature: 36.3 °C (97.4 °F)  Temp src: Oral  Pulse: 70  Respiration: 18  Blood Pressure : 139/64  Blood Pressure MAP (Calculated): 89 MM HG  BP Location: Left, Upper Arm  Patient BP Position: Supine     Oxygen  Pulse Oximetry: 90 %  O2 Delivery Device: None - Room Air    Bowel and Bladder  Last Bowel Movement  03/01/24  Stool Type     Bowel Device     Continent  Bladder: Did not void   Bowel: No movement  Bladder Function  Urine Void (mL):  (large void)  Number of Times Voided: 1  Genitourinary Assessment   Bladder Assessment (WDL):  Within Defined Limits  Time Void: Yes  Bladder Scan: Post Void  $ Bladder Scan Results (mL): 1    Skin  Michael Score   18  Sensory Interventions   Bed Types: Standard/Trauma Mattress  Skin Preventative Measures: Pillows in Use for Support / Positioning, Waffle Overlay  Moisture Interventions         Pain  Pain Rating Scale     Pain Location     Pain Location Orientation     Pain Interventions   Declines    ADLs    Bathing      Linen Change      Personal Hygiene     Chlorhexidine Bath      Oral Care     Teeth/Dentures     Shave     Nutrition  Percentage Eaten     Environmental Precautions     Patient Turns/Positioning     Patient Turns Assistance/Tolerance     Bed Positions     Head of Bed Elevated         Psychosocial/Neurologic Assessment  Psychosocial Assessment     Neurologic Assessment  Neuro (WDL): Exceptions to WDL  Level of Consciousness: Alert  Orientation Level: Oriented to place, Oriented to person  Cognition: Follows commands, Memory Loss  Speech: Clear  Muscle Strength Right Arm: Good Strength Against Gravity and Moderate Resistance  Muscle Strength Left Arm: Good Strength Against Gravity and Moderate Resistance  Muscle Strength Right Leg: Good Strength Against Gravity and Moderate Resistance  Muscle Strength Left Leg: Good Strength Against Gravity and Moderate Resistance       Cardio/Pulmonary Assessment  Edema      Respiratory Breath Sounds  RUL Breath Sounds: Clear  RML Breath Sounds: Clear  RLL Breath Sounds: Clear  IRAJ Breath Sounds: Clear  LLL Breath Sounds: Clear  Cardiac Assessment   Cardiac (WDL):  Within Defined Limits

## 2024-03-02 NOTE — CONSULTS
HOSPITAL MEDICINE CONSULTATION    Requesting Physician:  Dr. Wen    Reason for Consult:  Hypertension, Diabetes    History of Present Illness:  The patient is an 89-year-old  female with past medical history significant for hypertension, non-insulin dependent diabetes mellitus, and dyslipidemia.  She was admitted fo [facility] on 2/27/24 as a transfer from [facility] for subspecialty consultation following a ground level fall with L2 compression fracture.  She underwent kyphoplasty on 2/29/24 per Interventional Radiology.  She is also completing antimicrobial therapy for urinary tract infection.  Due to her ongoing functional debility, the patient was transferred to [facility] on 3/1/24.  Hospital Medicine consultation is requested to assist in the management of this patient's HTN and DM.    Review of Systems:  Review of Systems   Constitutional:  Negative for chills and fever.   HENT: Negative.     Eyes: Negative.    Respiratory:  Negative for cough and shortness of breath.    Cardiovascular:  Negative for chest pain and palpitations.   Gastrointestinal:  Negative for abdominal pain, nausea and vomiting.   Genitourinary: Negative.    Musculoskeletal:  Positive for back pain.   Skin:  Negative for itching and rash.   Endo/Heme/Allergies:  Negative for polydipsia. Does not bruise/bleed easily.   All other systems reviewed and are negative.      Allergies:  No Known Allergies    Medications:    Current Facility-Administered Medications:     insulin regular (HumuLIN R,NovoLIN R) injection, 2-12 Units, Subcutaneous, 4X/DAY ACHS **AND** POC blood glucose manual result, , , Q AC AND BEDTIME(S) **AND** NOTIFY MD and PharmD, , , Once **AND** Administer 20 grams of glucose (approximately 8 ounces of fruit juice) every 15 minutes PRN FSBG less than 70 mg/dL, , , PRN **AND** dextrose 50% (D50W) injection 25 g, 25 g, Intravenous, Q15 MIN PRN, Darling De Luna,  M.D.    Respiratory Therapy Consult, , Nebulization, Continuous RT, Wendy Pride D.O.    Pharmacy Consult Request ...Pain Management Review 1 Each, 1 Each, Other, PHARMACY TO DOSE, Wendy Pride D.O.    omeprazole (PriLOSEC) capsule 20 mg, 20 mg, Oral, DAILY, Wendy Pride D.O., 20 mg at 03/02/24 0823    hydrALAZINE (Apresoline) tablet 25 mg, 25 mg, Oral, Q8HRS PRN, Wendy Pride D.O.    carboxymethylcellulose (Refresh Tears) 0.5 % ophthalmic drops 1 Drop, 1 Drop, Both Eyes, PRN, Wendy Pride D.O.    benzocaine-menthol (Cepacol) lozenge 1 Lozenge, 1 Lozenge, Mouth/Throat, Q2HRS PRN, Wendy Pride D.O.    mag hydrox-al hydrox-simeth (Maalox Plus Es Or Mylanta Ds) suspension 20 mL, 20 mL, Oral, Q2HRS PRN, Wendy Pride D.O.    ondansetron (Zofran ODT) dispertab 4 mg, 4 mg, Oral, 4X/DAY PRN **OR** ondansetron (Zofran) syringe/vial injection 4 mg, 4 mg, Intramuscular, 4X/DAY PRN, Wendy Pride D.O.    traZODone (Desyrel) tablet 50 mg, 50 mg, Oral, QHS PRN, Wendy Pride D.O.    sodium chloride (Ocean) 0.65 % nasal spray 2 Spray, 2 Spray, Nasal, PRN, Wendy Pride D.O.    amLODIPine (Norvasc) tablet 5 mg, 5 mg, Oral, DAILY, Wendy Pride D.O., 5 mg at 03/02/24 0604    ciprofloxacin (Cipro) tablet 500 mg, 500 mg, Oral, Q12HRS, Wendy Pride D.O., 500 mg at 03/02/24 0823    enoxaparin (Lovenox) inj 30 mg, 30 mg, Subcutaneous, DAILY AT 1800, Wendy Pride D.O., 30 mg at 03/01/24 1709    gabapentin (Neurontin) capsule 100 mg, 100 mg, Oral, TID, QUIN LandO., 100 mg at 03/02/24 1436    irbesartan (Avapro) tablet 300 mg, 300 mg, Oral, DAILY, QUIN LandO., 300 mg at 03/02/24 0604    lidocaine (Asperflex) 4 % patch 1 Patch, 1 Patch, Transdermal, Q24HR, Wendy Pride D.O., 1 Patch at 03/02/24 0603    metFORMIN (Glucophage) tablet 500 mg, 500 mg, Oral, DAILY, QUIN LandO., 500 mg at 03/02/24 0823    methocarbamol (Robaxin)  tablet 500 mg, 500 mg, Oral, 4X/DAY, Wendy Pride D.O., 500 mg at 03/02/24 1436    rosuvastatin (Crestor) tablet 10 mg, 10 mg, Oral, DAILY, Wendy Pride D.O., 10 mg at 03/02/24 0823    vitamin D3 (Cholecalciferol) tablet 1,000 Units, 1,000 Units, Oral, DAILY, Wendy Pride D.O., 1,000 Units at 03/02/24 0823    acetaminophen (Tylenol) tablet 650 mg, 650 mg, Oral, Q6HRS PRN, Wendy Pride D.O.    oxyCODONE immediate-release (Roxicodone) tablet 2.5 mg, 2.5 mg, Oral, Q4HRS PRN **OR** oxyCODONE immediate-release (Roxicodone) tablet 5 mg, 5 mg, Oral, Q4HRS PRN, Wendy Pride D.O.    polyethylene glycol/lytes (Miralax) Packet 1 Packet, 1 Packet, Oral, BID PRN, Wendy Pride D.O.    Past Medical/Surgical History:  Past Medical History:   Diagnosis Date    DMII (diabetes mellitus, type 2) (HCC)     High cholesterol     HTN (hypertension)      No past surgical history on file.    Social History:  Social History     Socioeconomic History    Marital status:      Spouse name: Not on file    Number of children: Not on file    Years of education: Not on file    Highest education level: Not on file   Occupational History    Not on file   Tobacco Use    Smoking status: Never    Smokeless tobacco: Never   Vaping Use    Vaping Use: Never used   Substance and Sexual Activity    Alcohol use: Not Currently    Drug use: Never    Sexual activity: Not on file   Other Topics Concern    Not on file   Social History Narrative    Not on file     Social Determinants of Health     Financial Resource Strain: Not on file   Food Insecurity: Not on file   Transportation Needs: Not on file   Physical Activity: Not on file   Stress: Not on file   Social Connections: Not on file   Intimate Partner Violence: Not on file   Housing Stability: Not on file       Family History:  No family history on file.    Physical Examination:   Vitals:    03/01/24 1500 03/01/24 1743 03/02/24 0604 03/02/24 1023   BP: 139/64  135/68  134/77   Pulse: 68 70 78 73   Resp: 17 18 18 16   Temp: 36.3 °C (97.4 °F)  36.9 °C (98.5 °F) 36.6 °C (97.9 °F)   TempSrc: Oral  Oral Oral   SpO2: 92% 90% 100% 93%   Weight: 47.9 kg (105 lb 9.6 oz)          Physical Exam  Vitals reviewed.   Constitutional:       General: She is not in acute distress.     Appearance: Normal appearance. She is not ill-appearing.   HENT:      Head: Normocephalic and atraumatic.      Right Ear: External ear normal.      Left Ear: External ear normal.      Nose: Nose normal.      Mouth/Throat:      Pharynx: Oropharynx is clear.   Eyes:      General:         Right eye: No discharge.         Left eye: No discharge.      Extraocular Movements: Extraocular movements intact.      Conjunctiva/sclera: Conjunctivae normal.   Cardiovascular:      Rate and Rhythm: Normal rate and regular rhythm.   Pulmonary:      Effort: Pulmonary effort is normal. No respiratory distress.      Breath sounds: Normal breath sounds. No wheezing.   Abdominal:      General: Bowel sounds are normal. There is no distension.      Palpations: Abdomen is soft.      Tenderness: There is no abdominal tenderness.   Musculoskeletal:      Cervical back: Normal range of motion and neck supple.      Right lower leg: No edema.      Left lower leg: No edema.   Skin:     General: Skin is warm and dry.   Neurological:      Mental Status: She is alert and oriented to person, place, and time.         Laboratory Data:  Recent Labs     02/29/24  0330 03/01/24 0226 03/02/24  0545   WBC 13.0* 11.3* 8.5   RBC 3.84* 3.37* 4.19*   HEMOGLOBIN 12.1 10.6* 13.1   HEMATOCRIT 36.1* 30.8* 39.7   MCV 94.0 91.4 94.7   MCH 31.5 31.5 31.3   MCHC 33.5 34.4 33.0   RDW 44.0 41.8 44.4   PLATELETCT 319 321 443   MPV 8.0* 8.1* 8.1*     Recent Labs     02/29/24  0330 03/01/24 0226 03/02/24  0545   SODIUM 132* 131* 135   POTASSIUM 4.0 4.5 4.3   CHLORIDE 96 96 96   CO2 24 24 26   GLUCOSE 125* 168* 116*   BUN 13 16 11   CREATININE 0.27* 0.29* 0.51   CALCIUM 8.5  8.5 9.1       Imaging:  No orders to display       Impressions/Recommendations:  Dyslipidemia  On Crestor    Lumbar compression fracture, closed, initial encounter ([facility])  2/2 GLF  S/P L2 kyphoplasty on 2/29/24 by IR  Wound care and pain control per Physiatry    UTI (urinary tract infection)  UCx PSAR  Complete Cipro course    Hypertension  On Norvasc and Irbesartan  Observe blood pressure trends    Diabetes mellitus type II, controlled ([facility])  HbA1c 6.4  Increase SSI  Observe serum glucose trends  Outpt meds include Metformin    DNR    Thank you for the opportunity to assist in this patient's care.  We will continue to follow along with you.

## 2024-03-02 NOTE — CARE PLAN
"  Problem: Fall Risk - Rehab  Goal: Patient will remain free from falls  Note: Hanh Brennan Fall risk Assessment Score: 18    High fall risk Interventions   - Alarming seatbelt  - Wander guard  - Bed and strip alarm   - Yellow sign by the door   - Yellow wrist band \"Fall risk\"  - Room near to the nurse station  - Do not leave patient unattended in the bathroom  - Fall risk education provided      Problem: Bladder / Voiding  Goal: Patient will establish and maintain regular urinary output  Note: ABT for UTI. Encouraged increase fluids intake. Will monitor.     Problem: Diabetes Management  Goal: Patient's ability to maintain appropriate glucose levels will be maintained or improve  Note: FS ac and hs.         The patient is Watcher - Medium risk of patient condition declining or worsening    Shift Goals  Clinical Goals: Safety        "

## 2024-03-02 NOTE — PROGRESS NOTES
NURSING DAILY NOTE    Name: Mariana Jett   Date of Admission: 3/1/2024   Admitting Diagnosis: Closed compression fracture of L2 lumbar vertebra, initial encounter ([facility])  Attending Physician: Wendy Pride D.o.  Allergies: Patient has no known allergies.    Safety  Patient Assist     Patient Precautions     Precaution Comments     Bed Transfer Status     Toilet Transfer Status      Assistive Devices  Wheelchair  Oxygen  None - Room Air  Diet/Therapeutic Dining  Current Diet Order   Procedures    Diet Order Diet: Consistent CHO (Diabetic)     Pill Administration  whole  Agitated Behavioral Scale     ABS Level of Severity       Fall Risk  Has the patient had a fall this admission?   No  Hanh Brennan Fall Risk Scoring  18, HIGH RISK  Fall Risk Safety Measures  bed alarm and chair alarm    Vitals  Temperature: 36.3 °C (97.4 °F)  Temp src: Oral  Pulse: 68  Respiration: 17  Blood Pressure : 139/64  Blood Pressure MAP (Calculated): 89 MM HG  BP Location: Left, Upper Arm  Patient BP Position: Supine     Oxygen  Pulse Oximetry: 92 %  O2 Delivery Device: None - Room Air    Bowel and Bladder  Last Bowel Movement  03/01/24  Stool Type     Bowel Device     Continent  Bladder: Did not void   Bowel: No movement  Bladder Function  Number of Times Voided: 1  Genitourinary Assessment   Bladder Assessment (WDL):  Within Defined Limits  $ Bladder Scan Results (mL): 1    Skin  Michael Score   18  Sensory Interventions   Bed Types: Standard/Trauma Mattress  Moisture Interventions         Pain  Pain Rating Scale     Pain Location     Pain Location Orientation     Pain Interventions        ADLs    Bathing      Linen Change      Personal Hygiene     Chlorhexidine Bath      Oral Care     Teeth/Dentures     Shave     Nutrition Percentage Eaten     Environmental Precautions     Patient Turns/Positioning     Patient Turns Assistance/Tolerance     Bed Positions     Head of Bed Elevated          Psychosocial/Neurologic Assessment  Psychosocial Assessment     Neurologic Assessment  Neuro (WDL): Exceptions to WDL  Level of Consciousness: Alert  Orientation Level: Oriented to place, Oriented to person  Cognition: Follows commands, Memory Loss  Speech: Clear  Muscle Strength Right Arm: Good Strength Against Gravity and Moderate Resistance  Muscle Strength Left Arm: Good Strength Against Gravity and Moderate Resistance  Muscle Strength Right Leg: Good Strength Against Gravity and Moderate Resistance  Muscle Strength Left Leg: Good Strength Against Gravity and Moderate Resistance       Cardio/Pulmonary Assessment  Edema      Respiratory Breath Sounds     Cardiac Assessment   Cardiac (WDL):  Within Defined Limits

## 2024-03-02 NOTE — PROGRESS NOTES
Physical Medicine & Rehabilitation Progress Note    Encounter Date: 3/2/2024    Chief Complaint: Weakness    Interval Events (Subjective):  Seen in bed. Pain controlled. Slept well last night. Reviewed labs    Objective:  VITAL SIGNS: /77   Pulse 73   Temp 36.6 °C (97.9 °F) (Oral)   Resp 16   Wt 47.9 kg (105 lb 9.6 oz)   SpO2 93%   BMI 17.18 kg/m²   Gen: No acute distress, well developed well nourished adult  HEENT: Normal Cephalic Atraumatic, Normal conjunctiva.   CV: warm extremities, well perfused, no edema  Resp: symmetric chest rise, breathing comfortably on room air  Abd: Soft, Non distended  Extremities: normal bulk, no atrophy  Skin: no visible rashes or lesions.   Neuro: alert, awake  Psych: Mood and affect appropriate and congruent    Laboratory Values:  Recent Results (from the past 72 hour(s))   POCT glucose device results    Collection Time: 02/28/24 12:05 PM   Result Value Ref Range    POC Glucose, Blood 115 (H) 65 - 99 mg/dL   POCT glucose device results    Collection Time: 02/28/24  5:02 PM   Result Value Ref Range    POC Glucose, Blood 213 (H) 65 - 99 mg/dL   POCT glucose device results    Collection Time: 02/28/24  9:17 PM   Result Value Ref Range    POC Glucose, Blood 128 (H) 65 - 99 mg/dL   CBC WITHOUT DIFFERENTIAL    Collection Time: 02/29/24  3:30 AM   Result Value Ref Range    WBC 13.0 (H) 4.8 - 10.8 K/uL    RBC 3.84 (L) 4.20 - 5.40 M/uL    Hemoglobin 12.1 12.0 - 16.0 g/dL    Hematocrit 36.1 (L) 37.0 - 47.0 %    MCV 94.0 81.4 - 97.8 fL    MCH 31.5 27.0 - 33.0 pg    MCHC 33.5 32.2 - 35.5 g/dL    RDW 44.0 35.9 - 50.0 fL    Platelet Count 319 164 - 446 K/uL    MPV 8.0 (L) 9.0 - 12.9 fL   Basic Metabolic Panel    Collection Time: 02/29/24  3:30 AM   Result Value Ref Range    Sodium 132 (L) 135 - 145 mmol/L    Potassium 4.0 3.6 - 5.5 mmol/L    Chloride 96 96 - 112 mmol/L    Co2 24 20 - 33 mmol/L    Glucose 125 (H) 65 - 99 mg/dL    Bun 13 8 - 22 mg/dL    Creatinine 0.27 (L) 0.50 - 1.40  mg/dL    Calcium 8.5 8.5 - 10.5 mg/dL    Anion Gap 12.0 7.0 - 16.0   ESTIMATED GFR    Collection Time: 24  3:30 AM   Result Value Ref Range    GFR (CKD-EPI) 104 >60 mL/min/1.73 m 2   POCT glucose device results    Collection Time: 24  7:46 AM   Result Value Ref Range    POC Glucose, Blood 115 (H) 65 - 99 mg/dL   POCT glucose device results    Collection Time: 24  1:37 PM   Result Value Ref Range    POC Glucose, Blood 235 (H) 65 - 99 mg/dL   EKG (IP)    Collection Time: 24  3:53 PM   Result Value Ref Range    Report       [facility]    Test Date:  2024  Pt Name:    JUNIE RIBERA                    Department: 131  MRN:        [id number]                      Room:       [facility]  Gender:     Female                       Technician: [id number]  :        [id number]                   Requested By:NURA TORRES  Order #:    [id number]                    Reading MD: Zack Lewis MD    Measurements  Intervals                                Axis  Rate:       79                           P:          50  MD:         162                          QRS:        34  QRSD:       96                           T:          92  QT:         411  QTc:        472    Interpretive Statements  Sinus rhythm  Low voltage, precordial leads  Nonspecific T abnormalities, lateral leads  No previous ECG available for comparison  Electronically Signed On 2024 15:56:53 PST by Zack Lewis MD     POCT glucose device results    Collection Time: 24  5:08 PM   Result Value Ref Range    POC Glucose, Blood 239 (H) 65 - 99 mg/dL   POCT glucose device results    Collection Time: 24  8:48 PM   Result Value Ref Range    POC Glucose, Blood 209 (H) 65 - 99 mg/dL   CBC WITHOUT DIFFERENTIAL    Collection Time: 24  2:26 AM   Result Value Ref Range    WBC 11.3 (H) 4.8 - 10.8 K/uL    RBC 3.37 (L) 4.20 - 5.40 M/uL    Hemoglobin 10.6 (L) 12.0 - 16.0 g/dL    Hematocrit 30.8 (L) 37.0 - 47.0 %    MCV 91.4 81.4 - 97.8  fL    MCH 31.5 27.0 - 33.0 pg    MCHC 34.4 32.2 - 35.5 g/dL    RDW 41.8 35.9 - 50.0 fL    Platelet Count 321 164 - 446 K/uL    MPV 8.1 (L) 9.0 - 12.9 fL   Basic Metabolic Panel    Collection Time: 03/01/24  2:26 AM   Result Value Ref Range    Sodium 131 (L) 135 - 145 mmol/L    Potassium 4.5 3.6 - 5.5 mmol/L    Chloride 96 96 - 112 mmol/L    Co2 24 20 - 33 mmol/L    Glucose 168 (H) 65 - 99 mg/dL    Bun 16 8 - 22 mg/dL    Creatinine 0.29 (L) 0.50 - 1.40 mg/dL    Calcium 8.5 8.5 - 10.5 mg/dL    Anion Gap 11.0 7.0 - 16.0   ESTIMATED GFR    Collection Time: 03/01/24  2:26 AM   Result Value Ref Range    GFR (CKD-EPI) 102 >60 mL/min/1.73 m 2   POCT glucose device results    Collection Time: 03/01/24  9:43 AM   Result Value Ref Range    POC Glucose, Blood 153 (H) 65 - 99 mg/dL   POCT glucose device results    Collection Time: 03/01/24 12:49 PM   Result Value Ref Range    POC Glucose, Blood 123 (H) 65 - 99 mg/dL   POCT glucose device results    Collection Time: 03/01/24  5:07 PM   Result Value Ref Range    POC Glucose, Blood 122 (H) 65 - 99 mg/dL   POCT glucose device results    Collection Time: 03/01/24  8:26 PM   Result Value Ref Range    POC Glucose, Blood 134 (H) 65 - 99 mg/dL   CBC with Differential    Collection Time: 03/02/24  5:45 AM   Result Value Ref Range    WBC 8.5 4.8 - 10.8 K/uL    RBC 4.19 (L) 4.20 - 5.40 M/uL    Hemoglobin 13.1 12.0 - 16.0 g/dL    Hematocrit 39.7 37.0 - 47.0 %    MCV 94.7 81.4 - 97.8 fL    MCH 31.3 27.0 - 33.0 pg    MCHC 33.0 32.2 - 35.5 g/dL    RDW 44.4 35.9 - 50.0 fL    Platelet Count 443 164 - 446 K/uL    MPV 8.1 (L) 9.0 - 12.9 fL    Neutrophils-Polys 59.00 44.00 - 72.00 %    Lymphocytes 27.20 22.00 - 41.00 %    Monocytes 11.00 0.00 - 13.40 %    Eosinophils 1.60 0.00 - 6.90 %    Basophils 0.60 0.00 - 1.80 %    Immature Granulocytes 0.60 0.00 - 0.90 %    Nucleated RBC 0.00 0.00 - 0.20 /100 WBC    Neutrophils (Absolute) 5.03 1.82 - 7.42 K/uL    Lymphs (Absolute) 2.32 1.00 - 4.80 K/uL     Monos (Absolute) 0.94 (H) 0.00 - 0.85 K/uL    Eos (Absolute) 0.14 0.00 - 0.51 K/uL    Baso (Absolute) 0.05 0.00 - 0.12 K/uL    Immature Granulocytes (abs) 0.05 0.00 - 0.11 K/uL    NRBC (Absolute) 0.00 K/uL   Comp Metabolic Panel (CMP)    Collection Time: 03/02/24  5:45 AM   Result Value Ref Range    Sodium 135 135 - 145 mmol/L    Potassium 4.3 3.6 - 5.5 mmol/L    Chloride 96 96 - 112 mmol/L    Co2 26 20 - 33 mmol/L    Anion Gap 13.0 7.0 - 16.0    Glucose 116 (H) 65 - 99 mg/dL    Bun 11 8 - 22 mg/dL    Creatinine 0.51 0.50 - 1.40 mg/dL    Calcium 9.1 8.5 - 10.5 mg/dL    Correct Calcium 9.0 8.5 - 10.5 mg/dL    AST(SGOT) 19 12 - 45 U/L    ALT(SGPT) 13 2 - 50 U/L    Alkaline Phosphatase 137 (H) 30 - 99 U/L    Total Bilirubin 0.5 0.1 - 1.5 mg/dL    Albumin 4.1 3.2 - 4.9 g/dL    Total Protein 6.9 6.0 - 8.2 g/dL    Globulin 2.8 1.9 - 3.5 g/dL    A-G Ratio 1.5 g/dL   Magnesium    Collection Time: 03/02/24  5:45 AM   Result Value Ref Range    Magnesium 2.0 1.5 - 2.5 mg/dL   Phosphorus    Collection Time: 03/02/24  5:45 AM   Result Value Ref Range    Phosphorus 2.8 2.5 - 4.5 mg/dL   TSH with Reflex to FT4    Collection Time: 03/02/24  5:45 AM   Result Value Ref Range    TSH 2.170 0.380 - 5.330 uIU/mL   Vitamin D, 25-hydroxy (blood)    Collection Time: 03/02/24  5:45 AM   Result Value Ref Range    25-Hydroxy   Vitamin D 25 61 30 - 100 ng/mL   ESTIMATED GFR    Collection Time: 03/02/24  5:45 AM   Result Value Ref Range    GFR (CKD-EPI) 89 >60 mL/min/1.73 m 2   POCT glucose device results    Collection Time: 03/02/24  7:20 AM   Result Value Ref Range    POC Glucose, Blood 127 (H) 65 - 99 mg/dL   POCT glucose device results    Collection Time: 03/02/24 11:32 AM   Result Value Ref Range    POC Glucose, Blood 118 (H) 65 - 99 mg/dL       Medications:  Scheduled Medications   Medication Dose Frequency    Pharmacy Consult Request  1 Each PHARMACY TO DOSE    omeprazole  20 mg DAILY    amLODIPine  5 mg DAILY    ciprofloxacin  500 mg  Q12HRS    enoxaparin (LOVENOX) injection  30 mg DAILY AT 1800    gabapentin  100 mg TID    insulin regular  1-6 Units 4X/DAY ACHS    irbesartan  300 mg DAILY    lidocaine  1 Patch Q24HR    metFORMIN  500 mg DAILY    methocarbamol  500 mg 4X/DAY    rosuvastatin  10 mg DAILY    vitamin D3  1,000 Units DAILY     PRN medications: Respiratory Therapy Consult, hydrALAZINE, carboxymethylcellulose, benzocaine-menthol, mag hydrox-al hydrox-simeth, ondansetron **OR** ondansetron, traZODone, sodium chloride, insulin regular **AND** POC blood glucose manual result **AND** NOTIFY MD and PharmD **AND** Administer 20 grams of glucose (approximately 8 ounces of fruit juice) every 15 minutes PRN FSBG less than 70 mg/dL **AND** dextrose bolus, acetaminophen, oxyCODONE immediate-release **OR** oxyCODONE immediate-release, polyethylene glycol/lytes    Diet:  Current Diet Order   Procedures    Diet Order Diet: Consistent CHO (Diabetic)       Medical Decision Making and Plan:  L2 compression fracture secondary to fall at [facility] 2/27/2024  S/p L2 Cementoplasty 2/29/2024 Dr. Sawyer  PT and OT for mobility and ADLs. Per guidelines, 15 hours per week between PT, OT and/or SLP.  Follow-up with PCP  Restrictions: No strenuous activity, heavy lifting, twisting for 1-2 weeks.  Do not lift anything greater than 7 pounds for 1-2 weeks.     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 Chew  Follow-up orthospine as an outpatient     Hyponatremia  Mild, monitor     Leukocytosis  Being treated for UTI, follow-up a.m. labs     Anemia  Drop from 12.1 down to 10.6 3/1     Thrombocytosis  Resolved prior to admission     Hypertension  Continue amlodipine 5 mg daily  Continue Avapro 300 mg daily  -IM onboard     Hyperlipidemia  Continue rosuvastatin 10 mg nightly     Type 2 diabetes mellitus with hyperglycemia  Sliding scale insulin  Continue metformin 500 mg  daily   -IM onboard    Pain  As needed Tylenol  As needed oxycodone  As needed ice  Scheduled Robaxin 500 mg 4 times daily  Scheduled lidocaine patch every 24 hours  Scheduled gabapentin 100 mg 3 times daily  -continue oxycodone prn     Skin  Patient at risk for skin breakdown due to debility in areas including sacrum, achilles, elbows and head in addition to other sites. Nursing to assess skin daily.      GI Ppx  Patient on Prilosec for GERD prophylaxis.      Bowel   Continue MiraLAX 1 packet twice daily PRN     Bladder  Pseudomonas UTI (onset prior to admission)  TV/PVR/BS PRN  Continue ciprofloxacin 500 mg twice daily for 5-day course        Upcoming Labs/imaging: Admission labs     DVT PROPHYLAXIS: continue lovenox    ____________________________________    Abhay Wen MD  Physical Medicine & Rehabilitation   Brain Injury Medicine   ____________________________________

## 2024-03-02 NOTE — PROGRESS NOTES
Patient admitted to facility at 1440 via transport; accompanied by hospital transport.  Patient assisted to room and positioned in bed for comfort and safety; call light within reach.  Patient assisted with stowing belongings and oriented to room and facility.  Admission assessment performed and documented in computer.  Admission paperwork completed; signed copies placed in chart.  Will continue to monitor.

## 2024-03-02 NOTE — PROGRESS NOTES
NURSING DAILY NOTE    Name: Mariana Jett   Date of Admission: 3/1/2024   Admitting Diagnosis: Closed compression fracture of L2 lumbar vertebra, initial encounter ([facility])  Attending Physician: Wendy Pride D.o.  Allergies: Patient has no known allergies.    Safety  Patient Assist     Patient Precautions     Precaution Comments     Bed Transfer Status     Toilet Transfer Status      Assistive Devices  Wheelchair  Oxygen  None - Room Air  Diet/Therapeutic Dining  Current Diet Order   Procedures    Diet Order Diet: Consistent CHO (Diabetic)     Pill Administration  whole  Agitated Behavioral Scale     ABS Level of Severity       Fall Risk  Has the patient had a fall this admission?   No  Hanh Brennan Fall Risk Scoring  14, MODERATE RISK  Fall Risk Safety Measures  bed alarm, chair alarm, and low vision/ hearing    Vitals  Temperature: 36.6 °C (97.9 °F)  Temp src: Oral  Pulse: 73  Respiration: 16  Blood Pressure : 134/77  Blood Pressure MAP (Calculated): 96 MM HG  BP Location: Right, Upper Arm  Patient BP Position: Supine     Oxygen  Pulse Oximetry: 93 %  O2 Delivery Device: None - Room Air    Bowel and Bladder  Last Bowel Movement  03/01/24  Stool Type     Bowel Device     Continent  Bladder: Did not void   Bowel: No movement  Bladder Function  Urine Void (mL):  (large void)  Number of Times Voided: 1  Urine Color: Unable To Evaluate  Genitourinary Assessment   Bladder Assessment (WDL):  Within Defined Limits  Urine Color: Unable To Evaluate  Time Void: Yes  Bladder Scan: Post Void  $ Bladder Scan Results (mL): 1    Skin  Michael Score   18  Sensory Interventions   Bed Types: Standard/Trauma Mattress with Overlay  Skin Preventative Measures: Pillows in Use for Support / Positioning  Moisture Interventions         Pain  Pain Rating Scale     Pain Location     Pain Location Orientation     Pain Interventions   Declines    ADLs    Bathing   Shower, Staff (Shower  with Ot )  Linen Change      Personal Hygiene     Chlorhexidine Bath      Oral Care     Teeth/Dentures     Shave     Nutrition Percentage Eaten  *  * Meal *  *, Breakfast, Between 25-50% Consumed (45%240  glucerna)  Environmental Precautions     Patient Turns/Positioning     Patient Turns Assistance/Tolerance     Bed Positions     Head of Bed Elevated         Psychosocial/Neurologic Assessment  Psychosocial Assessment     Neurologic Assessment  Neuro (WDL): Exceptions to WDL  Level of Consciousness: Alert  Orientation Level: Oriented to place, Oriented to person  Cognition: Follows commands, Memory Loss  Speech: Clear  Muscle Strength Right Arm: Good Strength Against Gravity and Moderate Resistance  Muscle Strength Left Arm: Good Strength Against Gravity and Moderate Resistance  Muscle Strength Right Leg: Good Strength Against Gravity and Moderate Resistance  Muscle Strength Left Leg: Good Strength Against Gravity and Moderate Resistance       Cardio/Pulmonary Assessment  Edema      Respiratory Breath Sounds  RUL Breath Sounds: Clear  RML Breath Sounds: Clear  RLL Breath Sounds: Clear  IRAJ Breath Sounds: Clear  LLL Breath Sounds: Clear  Cardiac Assessment   Cardiac (WDL):  Within Defined Limits

## 2024-03-02 NOTE — THERAPY
Physical Therapy   Initial Evaluation     Patient Name: Mariana Jett  Age:  89 y.o., Sex:  female  Medical Record #: [id number]  Today's Date: 3/2/2024     Subjective    Patient pleasant and motivated to participate. Reports mild back pain but willing to participate.      Objective       03/02/24 1031   PT Charge Group   PT Neuromuscular Re-Education / Balance (Units) 1   PT Evaluation PT Evaluation Mod   PT Total Time Spent   PT Individual Total Time Spent (Mins) 60   Prior Living Situation   Prior Services Continuous (24 Hour) Care Giving Per Service  (Assisted living facility)   Housing / Facility Assisted Living Residence   Steps Into Home 0  (Patient reports her daughter's home has 1-2 steps)   Steps In Home 0   Elevator Yes   Lives with - Patient's Self Care Capacity   (Patient residing on [facility] but reports she will be discharging to her daughter's home)   Prior Level of Functional Mobility   Bed Mobility Independent   Transfer Status Independent   Ambulation Independent   Distance Ambulation (Feet)   (Patient reports largely limited to household distances)   Assistive Devices Used 4-Wheel Walker   Stairs Required Assist  (patient reports her grandson assists her with stairs)   Prior Functioning: Everyday Activities   Self Care Needed some help   Indoor Mobility (Ambulation) Independent   Stairs Needed some help   Functional Cognition Needed some help   Prior Device Use Walker   Strength Lower Body   Comments Strength grossly 4/5 in B LE   Sensation Lower Body   Lower Extremity Sensation   WDL   Balance Assessment   Sitting Balance (Static) Good   Sitting Balance (Dynamic) Good   Standing Balance (Static) Poor +   Standing Balance (Dynamic) Poor -   Weight Shift Sitting Good   Weight Shift Standing Poor   Bed Mobility    Supine to Sit Minimal Assist   Sit to Supine Minimal Assist   Sit to Stand Contact Guard Assist   Rolling Minimal Assist to Rt.;Minimum Assist to Lt.   Roll Left and Right   Physical Assistance Level  25% or less   CARE Score - Roll Left and Right 3   Roll Left and Right Discharge Goal   Discharge Goal 6   Sit to Lying   Physical Assistance Level 25% or less   CARE Score - Sit to Lying 3   Sit to Lying Discharge Goal   Discharge Goal 6   Lying to Sitting on Side of Bed   Physical Assistance Level 25% or less   CARE Score - Lying to Sitting on Side of Bed 3   Lying to Sitting on Side of Bed Discharge Goal   Discharge Goal 6   Sit to Stand   Assistance Needed Incidental touching   CARE Score - Sit to Stand 4   Sit to Stand Discharge Goal   Discharge Goal 6   Chair/Bed-to-Chair Transfer   Assistance Needed Incidental touching;Adaptive equipment   CARE Score - Chair/Bed-to-Chair Transfer 4   Chair/Bed-to-Chair Transfer Discharge Goal   Discharge Goal 6   Car Transfer   Reason if not Attempted Safety concerns   CARE Score - Car Transfer 88   Car Transfer Discharge Goal   Discharge Goal 4   Walk 10 Feet   Assistance Needed Incidental touching;Adaptive equipment   CARE Score - Walk 10 Feet 4   Walk 10 Feet Discharge Goal   Discharge Goal 4   Walk 50 Feet with Two Turns   Assistance Needed Incidental touching;Adaptive equipment   CARE Score - Walk 50 Feet with Two Turns 4   Walk 50 Feet with Two Turns Discharge Goal   Discharge Goal 4   Walk 150 Feet   Reason if not Attempted Safety concerns   CARE Score - Walk 150 Feet 88   Walk 150 Feet Discharge Goal   Discharge Goal 4   Walking 10 Feet on Uneven Surfaces   Reason if not Attempted Safety concerns   CARE Score - Walking 10 Feet on Uneven Surfaces 88   Walking 10 Feet on Uneven Surfaces Discharge Goal   Discharge Goal 3   1 Step (Curb)   Physical Assistance Level 25% or less   CARE Score - 1 Step (Curb) 3   1 Step (Curb) Discharge Goal   Discharge Goal 4   4 Steps   Reason if not Attempted Safety concerns   CARE Score - 4 Steps 88   4 Steps Discharge Goal   Discharge Goal 4   12 Steps   Reason if not Attempted Safety concerns   CARE Score - 12 Steps 88   12 Steps  Discharge Goal   Discharge Goal 4   Picking Up Object   Reason if not Attempted Safety concerns   CARE Score - Picking Up Object 88   Picking Up Object Discharge Goal   Discharge Goal 4   Change Reason using a reacher   Wheel 50 Feet with Two Turns   Reason if not Attempted Activity not applicable   CARE Score - Wheel 50 Feet with Two Turns 9   Wheel 50 Feet with Two Turns Discharge Goal   Discharge Goal 9   Wheel 150 Feet   Reason if not Attempted Activity not applicable   CARE Score - Wheel 150 Feet 9   Wheel 150 Feet Discharge Goal   Discharge Goal 9   Gait Functional Level of Assist    Gait Level Of Assist Contact Guard Assist   Assistive Device Front Wheel Walker   Distance (Feet) 65   # of Times Distance was Traveled 2   Deviation Trendelenberg;Decreased Base Of Support;Bradykinetic;Decreased Heel Strike;Decreased Toe Off  (L hip IR, R trendelenberg)   Stairs Functional Level of Assist   Level of Assist with Stairs Contact Guard Assist   # of Stairs Climbed 1  (up/down 4inch step in // bars x 6 repetitions)   Stairs Description Extra time;Hand rails;Limited by fatigue;Supervision for safety   Transfer Functional Level of Assist   Bed, Chair, Wheelchair Transfer Contact Guard Assist   Bed Chair Wheelchair Transfer Description Adaptive equipment;Increased time;Supervision for safety  (Stand step with FWW)   Problem List    Problems Pain;Impaired Bed Mobility;Impaired Ambulation;Functional Strength Deficit;Impaired Balance;Impaired Coordination;Decreased Activity Tolerance;Motor Planning / Sequencing   Precautions   Precautions Fall Risk;Spinal / Back Precautions    Current Discharge Plan   Current Discharge Plan Temporarily go to Family /  Friend's Home   Interdisciplinary Plan of Care Collaboration   IDT Collaboration with  Occupational Therapist   Patient Position at End of Therapy Seated;Chair Alarm On  (in dining room for lunch)   Collaboration Comments Collaborated with OT re: CLOF and POC   Benefit    Therapy Benefit Patient Would Benefit from Inpatient Rehabilitation Physical Therapy to Maximize Functional [place] with ADLs, IADLs and Mobility.   Strengths & Barriers   Strengths Able to follow instructions;Alert and oriented;Manages pain appropriately;Motivated for self care and independence;Pleasant and cooperative;Supportive family;Willingly participates in therapeutic activities   Barriers Decreased endurance;Fatigue;Impaired activity tolerance;Impaired balance;Pain;Generalized weakness       Assessment  Patient is 89 y.o. female with a diagnosis of L2 compression fracture after GLF at Assisted Living Facility. She underwent kyphoplasty on 2/29/24. 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 She has history of frequent falls with previous kyphoplasties and reports B hip fractures after falling. She reports being largely independent with mobility, using a 4WW, and residing at an [facility].     She presents to rehab with deficits in functional mobility due to pain, impaired balance and decreased activity/tolerance. She has significant balance impairments, particularly with narrow LISETH and on compliant surfaces. Patient pleasant and very motivated to participate.     Plan  Recommend Physical Therapy  minutes per day 5-7 days per week for 7-10 days for the following treatments:  PT Gait Training, PT Therapeutic Exercises, PT Neuro Re-Ed/Balance, and PT Therapeutic Activity.    Passport items to be completed:  Get in/out of bed safely, in/out of a vehicle, safely use mobility device, walk or wheel around home/community, navigate up and down stairs, show how to get up/down from the ground, ensure home is accessible, demonstrate HEP, complete caregiver training    Goals:  Long term and short term goals have been discussed with patient and they are in agreement.    Physical Therapy Problems (Active)       Problem:  Balance       Dates: Start:  03/02/24         Goal: STG-Within one week, patient will maintain static standing balance with narrow LISETH and no UE support, for at least 30 seconds.        Dates: Start:  03/02/24               Problem: Mobility       Dates: Start:  03/02/24         Goal: STG-Within one week, patient will ambulate at least 100 feet with LRAD and CGA.        Dates: Start:  03/02/24            Goal: STG-Within one week, patient will ambulate up/down a curb with LRAD and min A.        Dates: Start:  03/02/24            Goal: STG-Within one week, patient will ascend and descend four to six stairs with rails and CGA.        Dates: Start:  03/02/24               Problem: PT-Long Term Goals       Dates: Start:  03/02/24         Goal: LTG-By discharge, patient will ambulate at least 150 feet with LRAD and supervision.        Dates: Start:  03/02/24            Goal: LTG-By discharge, patient will transfer one surface to another with LRAD and supervision.        Dates: Start:  03/02/24            Goal: LTG-By discharge, patient will ambulate up/down 4-6 stairs with B rails and SBA.        Dates: Start:  03/02/24            Goal: LTG-By discharge, patient will transfer in/out of a car with LRAD and SBA.        Dates: Start:  03/02/24

## 2024-03-02 NOTE — FLOWSHEET NOTE
03/01/24 1743   Events/Summary/Plan   Events/Summary/Plan RT Assessment   Vital Signs   Pulse 70   Respiration 18   Pulse Oximetry 90 %   $ Pulse Oximetry (Spot Check) Yes   Respiratory Assessment   Respiratory Pattern Within Normal Limits   Level of Consciousness Alert   Chest Exam   Work Of Breathing / Effort Within Normal Limits   Breath Sounds   RUL Breath Sounds Clear   RML Breath Sounds Clear   RLL Breath Sounds Clear   IRAJ Breath Sounds Clear   LLL Breath Sounds Clear   Secretions   Cough Strong;Non Productive   Oxygen   O2 Delivery Device None - Room Air

## 2024-03-02 NOTE — CARE PLAN
"  Problem: Fall Risk - Rehab  Goal: Patient will remain free from falls  Note: Hanh Brennan Fall risk Assessment Score: 18    High fall risk Interventions   - Bed and strip alarm   - Yellow sign by the door   - Yellow wrist band \"Fall risk\"  - Room near to the nurse station  - Do not leave patient unattended in the bathroom  - Fall risk education provided     The patient is Stable - Low risk of patient condition declining or worsening    Shift Goals  Clinical Goals: Safety  "

## 2024-03-02 NOTE — PROGRESS NOTES
Patient admitted to facility at  via 1440; accompanied by hospital transport.  Patient assisted to room and positioned in bed for comfort and safety; call light within reach.  Patient assisted with stowing belongings and oriented to room and facility.  Admission assessment performed and documented in computer.  Admission paperwork completed; signed copies placed in chart.  Will continue to monitor.

## 2024-03-03 LAB — GLUCOSE BLD STRIP.AUTO-MCNC: 147 MG/DL (ref 65–99)

## 2024-03-03 PROCEDURE — 82962 GLUCOSE BLOOD TEST: CPT

## 2024-03-03 PROCEDURE — 700101 HCHG RX REV CODE 250: Performed by: PHYSICAL MEDICINE & REHABILITATION

## 2024-03-03 PROCEDURE — 700111 HCHG RX REV CODE 636 W/ 250 OVERRIDE (IP): Performed by: PHYSICAL MEDICINE & REHABILITATION

## 2024-03-03 PROCEDURE — 99232 SBSQ HOSP IP/OBS MODERATE 35: CPT | Performed by: HOSPITALIST

## 2024-03-03 PROCEDURE — A9270 NON-COVERED ITEM OR SERVICE: HCPCS | Performed by: PHYSICAL MEDICINE & REHABILITATION

## 2024-03-03 PROCEDURE — 770010 HCHG ROOM/CARE - REHAB SEMI PRIVAT*

## 2024-03-03 PROCEDURE — 700102 HCHG RX REV CODE 250 W/ 637 OVERRIDE(OP): Performed by: PHYSICAL MEDICINE & REHABILITATION

## 2024-03-03 RX ORDER — AMLODIPINE BESYLATE 5 MG/1
10 TABLET ORAL DAILY
Status: DISCONTINUED | OUTPATIENT
Start: 2024-03-04 | End: 2024-03-10 | Stop reason: HOSPADM

## 2024-03-03 RX ADMIN — METHOCARBAMOL 500 MG: 500 TABLET ORAL at 14:25

## 2024-03-03 RX ADMIN — GABAPENTIN 100 MG: 100 CAPSULE ORAL at 09:24

## 2024-03-03 RX ADMIN — CIPROFLOXACIN 500 MG: 500 TABLET, FILM COATED ORAL at 09:24

## 2024-03-03 RX ADMIN — METHOCARBAMOL 500 MG: 500 TABLET ORAL at 17:48

## 2024-03-03 RX ADMIN — GABAPENTIN 100 MG: 100 CAPSULE ORAL at 21:49

## 2024-03-03 RX ADMIN — ENOXAPARIN SODIUM 30 MG: 100 INJECTION SUBCUTANEOUS at 17:48

## 2024-03-03 RX ADMIN — METHOCARBAMOL 500 MG: 500 TABLET ORAL at 09:24

## 2024-03-03 RX ADMIN — LIDOCAINE 1 PATCH: 4 PATCH TOPICAL at 05:06

## 2024-03-03 RX ADMIN — GABAPENTIN 100 MG: 100 CAPSULE ORAL at 14:25

## 2024-03-03 RX ADMIN — METFORMIN HYDROCHLORIDE 500 MG: 500 TABLET ORAL at 09:24

## 2024-03-03 RX ADMIN — HYDRALAZINE HYDROCHLORIDE 25 MG: 25 TABLET ORAL at 05:05

## 2024-03-03 RX ADMIN — IRBESARTAN 300 MG: 150 TABLET ORAL at 05:06

## 2024-03-03 RX ADMIN — OMEPRAZOLE 20 MG: 20 CAPSULE, DELAYED RELEASE ORAL at 09:24

## 2024-03-03 RX ADMIN — AMLODIPINE BESYLATE 5 MG: 5 TABLET ORAL at 05:06

## 2024-03-03 RX ADMIN — ACETAMINOPHEN 650 MG: 325 TABLET ORAL at 17:51

## 2024-03-03 RX ADMIN — Medication 1000 UNITS: at 09:24

## 2024-03-03 RX ADMIN — METHOCARBAMOL 500 MG: 500 TABLET ORAL at 21:49

## 2024-03-03 RX ADMIN — ROSUVASTATIN CALCIUM 10 MG: 10 TABLET, COATED ORAL at 09:24

## 2024-03-03 RX ADMIN — CIPROFLOXACIN 500 MG: 500 TABLET, FILM COATED ORAL at 21:49

## 2024-03-03 SDOH — ECONOMIC STABILITY: TRANSPORTATION INSECURITY
IN THE PAST 12 MONTHS, HAS LACK OF RELIABLE TRANSPORTATION KEPT YOU FROM MEDICAL APPOINTMENTS, MEETINGS, WORK OR FROM GETTING THINGS NEEDED FOR DAILY LIVING?: NO

## 2024-03-03 SDOH — ECONOMIC STABILITY: TRANSPORTATION INSECURITY
IN THE PAST 12 MONTHS, HAS THE LACK OF TRANSPORTATION KEPT YOU FROM MEDICAL APPOINTMENTS OR FROM GETTING MEDICATIONS?: NO

## 2024-03-03 ASSESSMENT — ENCOUNTER SYMPTOMS
FEVER: 0
ABDOMINAL PAIN: 0
SHORTNESS OF BREATH: 0
PALPITATIONS: 0
POLYDIPSIA: 0
BRUISES/BLEEDS EASILY: 0
EYES NEGATIVE: 1
COUGH: 0
VOMITING: 0
NAUSEA: 0
CHILLS: 0
BACK PAIN: 1

## 2024-03-03 ASSESSMENT — PAIN DESCRIPTION - PAIN TYPE
TYPE: ACUTE PAIN
TYPE: ACUTE PAIN

## 2024-03-03 NOTE — CARE PLAN
"  Problem: Fall Risk - Rehab  Goal: Patient will remain free from falls  Note: Hanh Brennna Fall risk Assessment Score: 14      Moderate fall risk Interventions  - Bed and strip alarm   - Yellow sign by the door   - Yellow wrist band \"Fall risk\"  - Room near to the nurse station  - Do not leave patient unattended in the bathroom  - Fall risk education provided      Problem: Infection  Goal: Patient will remain free from infection  Note: ABT for UTI. Encouraged increase fluids intake. No adverse reaction. Will monitor.         The patient is Stable - Low risk of patient condition declining or worsening    Shift Goals  Clinical Goals: safety        "

## 2024-03-03 NOTE — PROGRESS NOTES
NURSING DAILY NOTE    Name: Mariana Jett   Date of Admission: 3/1/2024   Admitting Diagnosis: Closed compression fracture of L2 lumbar vertebra, initial encounter ([facility])  Attending Physician: Wendy Pride D.o.  Allergies: Patient has no known allergies.    Safety  Patient Assist     Patient Precautions  Fall Risk, Spinal / Back Precautions   Precaution Comments     Bed Transfer Status  Contact Guard Assist  Toilet Transfer Status   Contact Guard Assist  Assistive Devices  Walker - front wheel  Oxygen  None - Room Air  Diet/Therapeutic Dining  Current Diet Order   Procedures    Diet Order Diet: Consistent CHO (Diabetic)     Pill Administration  whole  Agitated Behavioral Scale     ABS Level of Severity       Fall Risk  Has the patient had a fall this admission?   No  Hanh Brennan Fall Risk Scoring  15, HIGH RISK  Fall Risk Safety Measures  bed alarm, chair alarm, poor balance, and low vision/ hearing    Vitals  Temperature: 36.6 °C (97.8 °F)  Temp src: Oral  Pulse: 87  Respiration: 16  Blood Pressure : (!) 87/59  Blood Pressure MAP (Calculated): 68 MM HG  BP Location: Left, Upper Arm  Patient BP Position: Sitting     Oxygen  Pulse Oximetry: 94 %  O2 (LPM): 0  O2 Delivery Device: None - Room Air    Bowel and Bladder  Last Bowel Movement  03/01/24  Stool Type     Bowel Device     Continent  Bladder: Did not void   Bowel: No movement  Bladder Function  Urine Void (mL):  (large)  Number of Times Voided: 1  Urine Color: Yellow  Genitourinary Assessment   Bladder Assessment (WDL):  Within Defined Limits  Urine Color: Yellow  Bladder Device: Bathroom, Absorbent Brief (Pull Up)  Time Void: Yes  Bladder Scan: Post Void  $ Bladder Scan Results (mL): 1    Skin  Michael Score   18  Sensory Interventions   Bed Types: Standard/Trauma Mattress  Skin Preventative Measures: Waffle Overlay, Pillows in Use to Float Heels, Pillows in Use for Support / Positioning  Moisture  Interventions         Pain  Pain Rating Scale     Pain Location     Pain Location Orientation     Pain Interventions   Declines    ADLs    Bathing   Shower, Staff (Shower with Ot )  Linen Change      Personal Hygiene     Chlorhexidine Bath      Oral Care  Brushed Teeth  Teeth/Dentures     Shave     Nutrition Percentage Eaten  *  * Meal *  *, Less than 25% Consumed, Lunch  Environmental Precautions     Patient Turns/Positioning  Patient Turns Self from Side to Side  Patient Turns Assistance/Tolerance     Bed Positions     Head of Bed Elevated         Psychosocial/Neurologic Assessment  Psychosocial Assessment  Psychosocial (WDL):  Within Defined Limits  Neurologic Assessment  Neuro (WDL): Exceptions to WDL  Level of Consciousness: Alert  Orientation Level: Oriented to place, Oriented to situation, Oriented to person  Cognition: Appropriate judgement, Appropriate attention/concentration  Speech: Clear  Muscle Strength Right Arm: Good Strength Against Gravity and Moderate Resistance  Muscle Strength Left Arm: Good Strength Against Gravity and Moderate Resistance  Muscle Strength Right Leg: Good Strength Against Gravity and Moderate Resistance  Muscle Strength Left Leg: Good Strength Against Gravity and Moderate Resistance       Cardio/Pulmonary Assessment  Edema      Respiratory Breath Sounds  RUL Breath Sounds: Clear  RML Breath Sounds: Clear  RLL Breath Sounds: Diminished  IRAJ Breath Sounds: Clear  LLL Breath Sounds: Diminished  Cardiac Assessment   Cardiac (WDL):  Within Defined Limits

## 2024-03-03 NOTE — CARE PLAN
The patient is Stable - Low risk of patient condition declining or worsening    Shift Goals  Clinical Goals: safety    Progress made toward(s) clinical / shift goals:  2    Problem: Mobility  Goal: Patient's capacity to carry out activities will improve  Outcome: Progressing: Pt's ambulates w/ FWW, CGA, w/ gait belt to and from bathroom. Participating in therapies.      Problem: Diabetes Management  Goal: Patient's ability to maintain appropriate glucose levels will be maintained or improve  Outcome: Progressing: FSBG today = 127, 118, and 111.

## 2024-03-03 NOTE — DISCHARGE PLANNING
CASE MANAGEMENT INITIAL ASSESSMENT    Admit Date:  3/1/2024     I met with patient to discuss role of case management / discharge planning / team conference.   Patient is a  89 y.o. female transferred from [facility].    Attending physician: Dr. Pride  PCP: Patient just moved from [place] she does not have a pcp at this time.  IR: Dr. Sawyer   NSG: Dr. Chew     Patient will be moving back to her jeromy house at discharge. Patient was at [facility] 3 days prior to fall and daughter would like her back home. Patient has 4ww, fww, spc and w/c. Jeromy cottrell is a 1  with 2 ivana.     Diagnosis: Lumbar compression fracture, closed, initial encounter ([facility]) [S32.000A]    Co-morbidities:   Patient Active Problem List    Diagnosis Date Noted    Fall at home, initial encounter 02/29/2024    UTI (urinary tract infection) 02/27/2024    Advance care planning 02/27/2024    Lumbar compression fracture, closed, initial encounter ([facility]) 02/27/2024    Closed compression fracture of L2 lumbar vertebra, initial encounter ([facility]) 02/25/2024    Leukocytosis 02/25/2024    Hyponatremia 02/25/2024    Elevated alkaline phosphatase level 02/25/2024    Thrombocytosis 02/25/2024    Hypertension 02/25/2024    Diabetes mellitus type II, controlled ([facility]) 02/25/2024    Dyslipidemia 02/25/2024    DNR (do not resuscitate) 02/25/2024     Prior Living Situation:  Housing / Facility: Assisted Living Residence  Lives with - Patient's Self Care Capacity:  (Patient residing on [facility] but reports she will be discharging to her daughter's home)    Prior Level of Function:  Medication Management: Requires Assist  Finances: Requires Assist  Home Management: Requires Assist  Shopping: Requires Assist  Prior Level Of Mobility: Independent With Device in Home  Driving / Transportation: Relatives / Others Provide Transportation    Support Systems:  Primary : Adriana Wilkins (daughter)    Previous Services Utilized:   Equipment Owned: 4-Wheel Walker,  Front-Wheel Walker  Prior Services: Continuous (24 Hour) Care Giving Per Service (Assisted living facility)    Other Information:  Occupation (Pre-Hospital Vocational): Retired Due To Disability     Primary Payor Source: Medicare A, Medicare B  Secondary Payor Source: Other (Comments) ([email or web address])  Other MDs: Dr. Sawyer    Patient / Family Goal:       Plan:  1. Continue to follow patient through hospitalization and provide discharge planning in collaboration with patient, family, physicians and ancillary services.     2. Utilize community resources to ensure a safe discharge.

## 2024-03-03 NOTE — PROGRESS NOTES
Hospital Medicine Daily Progress Note        Chief Complaint  Hypertension  Diabetes    Interval Problem Update  Pt denies new complaints.  Last 24 hours BP range (134-172/61-77) and FSBS range (111-142) reviewed and discussed.    Review of Systems  Review of Systems   Constitutional:  Negative for chills and fever.   HENT: Negative.     Eyes: Negative.    Respiratory:  Negative for cough and shortness of breath.    Cardiovascular:  Negative for chest pain and palpitations.   Gastrointestinal:  Negative for abdominal pain, nausea and vomiting.   Musculoskeletal:  Positive for back pain.   Skin:  Negative for itching and rash.   Endo/Heme/Allergies:  Negative for polydipsia. Does not bruise/bleed easily.        Physical Exam  Temp:  [36.3 °C (97.4 °F)-37 °C (98.6 °F)] 36.6 °C (97.8 °F)  Pulse:  [69-87] 87  Resp:  [16-18] 16  BP: ()/(59-68) 87/59  SpO2:  [91 %-94 %] 94 %    Physical Exam  Vitals reviewed.   Constitutional:       General: She is not in acute distress.     Appearance: Normal appearance. She is not ill-appearing.   HENT:      Head: Normocephalic and atraumatic.      Right Ear: External ear normal.      Left Ear: External ear normal.      Nose: Nose normal.      Mouth/Throat:      Pharynx: Oropharynx is clear.   Eyes:      General:         Right eye: No discharge.         Left eye: No discharge.      Extraocular Movements: Extraocular movements intact.      Conjunctiva/sclera: Conjunctivae normal.   Cardiovascular:      Rate and Rhythm: Normal rate and regular rhythm.   Pulmonary:      Effort: Pulmonary effort is normal. No respiratory distress.      Breath sounds: Normal breath sounds. No wheezing.   Abdominal:      General: Bowel sounds are normal. There is no distension.      Palpations: Abdomen is soft.      Tenderness: There is no abdominal tenderness.   Musculoskeletal:      Cervical back: Normal range of motion and neck supple.      Right lower leg: No edema.      Left lower leg: No edema.    Skin:     General: Skin is warm and dry.   Neurological:      Mental Status: She is alert and oriented to person, place, and time.         Fluids    Intake/Output Summary (Last 24 hours) at 3/3/2024 1024  Last data filed at 3/2/2024 2127  Gross per 24 hour   Intake 360 ml   Output --   Net 360 ml       Laboratory  Recent Labs     03/01/24 0226 03/02/24  0545   WBC 11.3* 8.5   RBC 3.37* 4.19*   HEMOGLOBIN 10.6* 13.1   HEMATOCRIT 30.8* 39.7   MCV 91.4 94.7   MCH 31.5 31.3   MCHC 34.4 33.0   RDW 41.8 44.4   PLATELETCT 321 443   MPV 8.1* 8.1*     Recent Labs     03/01/24 0226 03/02/24  0545   SODIUM 131* 135   POTASSIUM 4.5 4.3   CHLORIDE 96 96   CO2 24 26   GLUCOSE 168* 116*   BUN 16 11   CREATININE 0.29* 0.51   CALCIUM 8.5 9.1                   Assessment/Plan  Lumbar compression fracture, closed, initial encounter ([facility])- (present on admission)  Assessment & Plan  2/2 GLF  S/P L2 kyphoplasty on 2/29/24 by IR  Wound care and pain control per Physiatry    UTI (urinary tract infection)- (present on admission)  Assessment & Plan  UCx PSAR  Complete Cipro course    Dyslipidemia- (present on admission)  Assessment & Plan  On Crestor    Diabetes mellitus type II, controlled ([facility])- (present on admission)  Assessment & Plan  HbA1c 6.4  Serum glucose well controlled on Metformin  Will discontinue FSBS and SSI  Outpt meds include Metformin 500 mg daily usually, but pt reports she takes twice daily as needed if FSBS >150    Hypertension- (present on admission)  Assessment & Plan  On Norvasc and Irbesartan  Will increase Norvasc to optimize blood pressure control  Check F/U labs in AM      Discussed w/ pt and RN (Jaylyn)     DNR

## 2024-03-03 NOTE — PROGRESS NOTES
NURSING DAILY NOTE    Name: Mariana Jett   Date of Admission: 3/1/2024   Admitting Diagnosis: Closed compression fracture of L2 lumbar vertebra, initial encounter ([facility])  Attending Physician: Wendy Pride D.o.  Allergies: Patient has no known allergies.    Safety  Patient Assist     Patient Precautions  Fall Risk, Spinal / Back Precautions   Precaution Comments     Bed Transfer Status  Contact Guard Assist  Toilet Transfer Status   Contact Guard Assist  Assistive Devices  Walker - four wheel  Oxygen  None - Room Air  Diet/Therapeutic Dining  Current Diet Order   Procedures    Diet Order Diet: Consistent CHO (Diabetic)     Pill Administration  whole  Agitated Behavioral Scale     ABS Level of Severity       Fall Risk  Has the patient had a fall this admission?   No  Hanh Brennan Fall Risk Scoring  14, MODERATE RISK  Fall Risk Safety Measures  bed alarm, chair alarm, and low vision/ hearing    Vitals  Temperature: 36.3 °C (97.4 °F)  Temp src: Oral  Pulse: 78  Respiration: 18  Blood Pressure : (!) 163/68  Blood Pressure MAP (Calculated): 100 MM HG  BP Location: Right, Upper Arm  Patient BP Position: Supine     Oxygen  Pulse Oximetry: 91 %  O2 Delivery Device: None - Room Air    Bowel and Bladder  Last Bowel Movement  03/01/24  Stool Type     Bowel Device     Continent  Bladder: Did not void   Bowel: No movement  Bladder Function  Urine Void (mL):  (large)  Number of Times Voided: 1  Urine Color: Yellow  Genitourinary Assessment   Bladder Assessment (WDL):  Within Defined Limits  Urine Color: Yellow  Bladder Device: Bathroom, Absorbent Brief (Pull Up)  Time Void: Yes  Bladder Scan: Post Void  $ Bladder Scan Results (mL): 1    Skin  Michael Score   18  Sensory Interventions   Bed Types: Standard/Trauma Mattress  Skin Preventative Measures: Pillows in Use for Support / Positioning, Waffle Overlay  Moisture Interventions         Pain  Pain Rating Scale     Pain  Location     Pain Location Orientation     Pain Interventions   Declines    ADLs    Bathing   Shower, Staff (Shower with Ot )  Linen Change      Personal Hygiene     Chlorhexidine Bath      Oral Care  Brushed Teeth  Teeth/Dentures     Shave     Nutrition Percentage Eaten  *  * Meal *  *, Less than 25% Consumed, Lunch  Environmental Precautions     Patient Turns/Positioning  Patient Turns Self from Side to Side  Patient Turns Assistance/Tolerance     Bed Positions     Head of Bed Elevated         Psychosocial/Neurologic Assessment  Psychosocial Assessment  Psychosocial (WDL):  Within Defined Limits  Neurologic Assessment  Neuro (WDL): Exceptions to WDL  Level of Consciousness: Alert  Orientation Level: Oriented to place, Oriented to person  Cognition: Follows commands, Memory Loss  Speech: Clear  Muscle Strength Right Arm: Good Strength Against Gravity and Moderate Resistance  Muscle Strength Left Arm: Good Strength Against Gravity and Moderate Resistance  Muscle Strength Right Leg: Good Strength Against Gravity and Moderate Resistance  Muscle Strength Left Leg: Good Strength Against Gravity and Moderate Resistance       Cardio/Pulmonary Assessment  Edema      Respiratory Breath Sounds  RUL Breath Sounds: Clear  RML Breath Sounds: Clear  RLL Breath Sounds: Clear  IRAJ Breath Sounds: Clear  LLL Breath Sounds: Clear  Cardiac Assessment   Cardiac (WDL):  Within Defined Limits

## 2024-03-04 ENCOUNTER — APPOINTMENT (OUTPATIENT)
Dept: OCCUPATIONAL THERAPY | Facility: REHABILITATION | Age: 89
DRG: 560 | End: 2024-03-04
Attending: PHYSICAL MEDICINE & REHABILITATION
Payer: MEDICARE

## 2024-03-04 ENCOUNTER — APPOINTMENT (OUTPATIENT)
Dept: PHYSICAL THERAPY | Facility: REHABILITATION | Age: 89
DRG: 560 | End: 2024-03-04
Attending: PHYSICAL MEDICINE & REHABILITATION
Payer: MEDICARE

## 2024-03-04 LAB
ANION GAP SERPL CALC-SCNC: 12 MMOL/L (ref 7–16)
BUN SERPL-MCNC: 11 MG/DL (ref 8–22)
CALCIUM SERPL-MCNC: 8.6 MG/DL (ref 8.5–10.5)
CHLORIDE SERPL-SCNC: 97 MMOL/L (ref 96–112)
CO2 SERPL-SCNC: 23 MMOL/L (ref 20–33)
CREAT SERPL-MCNC: 0.39 MG/DL (ref 0.5–1.4)
ERYTHROCYTE [DISTWIDTH] IN BLOOD BY AUTOMATED COUNT: 43.6 FL (ref 35.9–50)
GFR SERPLBLD CREATININE-BSD FMLA CKD-EPI: 95 ML/MIN/1.73 M 2
GLUCOSE SERPL-MCNC: 148 MG/DL (ref 65–99)
HCT VFR BLD AUTO: 35.8 % (ref 37–47)
HGB BLD-MCNC: 11.9 G/DL (ref 12–16)
MCH RBC QN AUTO: 31 PG (ref 27–33)
MCHC RBC AUTO-ENTMCNC: 33.2 G/DL (ref 32.2–35.5)
MCV RBC AUTO: 93.2 FL (ref 81.4–97.8)
PLATELET # BLD AUTO: 380 K/UL (ref 164–446)
PMV BLD AUTO: 8.1 FL (ref 9–12.9)
POTASSIUM SERPL-SCNC: 4.3 MMOL/L (ref 3.6–5.5)
RBC # BLD AUTO: 3.84 M/UL (ref 4.2–5.4)
SODIUM SERPL-SCNC: 132 MMOL/L (ref 135–145)
WBC # BLD AUTO: 7.3 K/UL (ref 4.8–10.8)

## 2024-03-04 PROCEDURE — 85027 COMPLETE CBC AUTOMATED: CPT

## 2024-03-04 PROCEDURE — 97129 THER IVNTJ 1ST 15 MIN: CPT

## 2024-03-04 PROCEDURE — 36415 COLL VENOUS BLD VENIPUNCTURE: CPT

## 2024-03-04 PROCEDURE — 770010 HCHG ROOM/CARE - REHAB SEMI PRIVAT*

## 2024-03-04 PROCEDURE — A9270 NON-COVERED ITEM OR SERVICE: HCPCS | Performed by: PHYSICAL MEDICINE & REHABILITATION

## 2024-03-04 PROCEDURE — 700111 HCHG RX REV CODE 636 W/ 250 OVERRIDE (IP): Performed by: PHYSICAL MEDICINE & REHABILITATION

## 2024-03-04 PROCEDURE — 80048 BASIC METABOLIC PNL TOTAL CA: CPT

## 2024-03-04 PROCEDURE — 97110 THERAPEUTIC EXERCISES: CPT

## 2024-03-04 PROCEDURE — 700102 HCHG RX REV CODE 250 W/ 637 OVERRIDE(OP): Performed by: HOSPITALIST

## 2024-03-04 PROCEDURE — 97602 WOUND(S) CARE NON-SELECTIVE: CPT

## 2024-03-04 PROCEDURE — A9270 NON-COVERED ITEM OR SERVICE: HCPCS | Performed by: HOSPITALIST

## 2024-03-04 PROCEDURE — 97116 GAIT TRAINING THERAPY: CPT

## 2024-03-04 PROCEDURE — 97112 NEUROMUSCULAR REEDUCATION: CPT

## 2024-03-04 PROCEDURE — 700102 HCHG RX REV CODE 250 W/ 637 OVERRIDE(OP): Performed by: PHYSICAL MEDICINE & REHABILITATION

## 2024-03-04 PROCEDURE — 99232 SBSQ HOSP IP/OBS MODERATE 35: CPT | Performed by: HOSPITALIST

## 2024-03-04 PROCEDURE — 700101 HCHG RX REV CODE 250: Performed by: PHYSICAL MEDICINE & REHABILITATION

## 2024-03-04 PROCEDURE — 99232 SBSQ HOSP IP/OBS MODERATE 35: CPT | Performed by: PHYSICAL MEDICINE & REHABILITATION

## 2024-03-04 PROCEDURE — 97535 SELF CARE MNGMENT TRAINING: CPT

## 2024-03-04 RX ORDER — CIPROFLOXACIN 500 MG/1
250 TABLET, FILM COATED ORAL EVERY 12 HOURS
Status: COMPLETED | OUTPATIENT
Start: 2024-03-04 | End: 2024-03-05

## 2024-03-04 RX ADMIN — ENOXAPARIN SODIUM 30 MG: 100 INJECTION SUBCUTANEOUS at 17:32

## 2024-03-04 RX ADMIN — IRBESARTAN 300 MG: 150 TABLET ORAL at 05:23

## 2024-03-04 RX ADMIN — OMEPRAZOLE 20 MG: 20 CAPSULE, DELAYED RELEASE ORAL at 08:37

## 2024-03-04 RX ADMIN — CIPROFLOXACIN 250 MG: 500 TABLET, FILM COATED ORAL at 21:11

## 2024-03-04 RX ADMIN — Medication 1000 UNITS: at 08:37

## 2024-03-04 RX ADMIN — METHOCARBAMOL 500 MG: 500 TABLET ORAL at 17:32

## 2024-03-04 RX ADMIN — LIDOCAINE 1 PATCH: 4 PATCH TOPICAL at 05:23

## 2024-03-04 RX ADMIN — CIPROFLOXACIN 500 MG: 500 TABLET, FILM COATED ORAL at 08:37

## 2024-03-04 RX ADMIN — METHOCARBAMOL 500 MG: 500 TABLET ORAL at 08:37

## 2024-03-04 RX ADMIN — ROSUVASTATIN CALCIUM 10 MG: 10 TABLET, COATED ORAL at 08:37

## 2024-03-04 RX ADMIN — AMLODIPINE BESYLATE 10 MG: 5 TABLET ORAL at 05:23

## 2024-03-04 RX ADMIN — GABAPENTIN 100 MG: 100 CAPSULE ORAL at 21:11

## 2024-03-04 RX ADMIN — OXYCODONE 2.5 MG: 5 TABLET ORAL at 13:04

## 2024-03-04 RX ADMIN — METHOCARBAMOL 500 MG: 500 TABLET ORAL at 21:11

## 2024-03-04 RX ADMIN — GABAPENTIN 100 MG: 100 CAPSULE ORAL at 08:37

## 2024-03-04 RX ADMIN — ACETAMINOPHEN 650 MG: 325 TABLET ORAL at 17:30

## 2024-03-04 RX ADMIN — METHOCARBAMOL 500 MG: 500 TABLET ORAL at 13:01

## 2024-03-04 RX ADMIN — METFORMIN HYDROCHLORIDE 500 MG: 500 TABLET ORAL at 08:37

## 2024-03-04 RX ADMIN — GABAPENTIN 100 MG: 100 CAPSULE ORAL at 14:18

## 2024-03-04 RX ADMIN — OXYCODONE 5 MG: 5 TABLET ORAL at 21:10

## 2024-03-04 ASSESSMENT — PAIN DESCRIPTION - PAIN TYPE
TYPE: ACUTE PAIN

## 2024-03-04 ASSESSMENT — ENCOUNTER SYMPTOMS
ABDOMINAL PAIN: 0
VOMITING: 0
COUGH: 0
SHORTNESS OF BREATH: 0
POLYDIPSIA: 0
PALPITATIONS: 0
EYES NEGATIVE: 1
BACK PAIN: 1
NAUSEA: 0
CHILLS: 0
BRUISES/BLEEDS EASILY: 0
FEVER: 0

## 2024-03-04 ASSESSMENT — GAIT ASSESSMENTS
DISTANCE (FEET): 80
ASSISTIVE DEVICE: FRONT WHEEL WALKER
ASSISTIVE DEVICE: FRONT WHEEL WALKER
DISTANCE (FEET): 50
GAIT LEVEL OF ASSIST: CONTACT GUARD ASSIST
DEVIATION: DECREASED BASE OF SUPPORT;BRADYKINETIC
GAIT LEVEL OF ASSIST: CONTACT GUARD ASSIST
DEVIATION: DECREASED BASE OF SUPPORT;BRADYKINETIC

## 2024-03-04 ASSESSMENT — ACTIVITIES OF DAILY LIVING (ADL)
BED_CHAIR_WHEELCHAIR_TRANSFER_DESCRIPTION: ADAPTIVE EQUIPMENT;INCREASED TIME;SET-UP OF EQUIPMENT;SUPERVISION FOR SAFETY
TOILET_TRANSFER_DESCRIPTION: ADAPTIVE EQUIPMENT;GRAB BAR;INCREASED TIME;SET-UP OF EQUIPMENT

## 2024-03-04 NOTE — CARE PLAN
The patient is Stable - Low risk of patient condition declining or worsening    Shift Goals  Clinical Goals: safety    Progress made toward(s) clinical / shift goals:  2    Problem: Skin Integrity  Goal: Patient's skin integrity will be maintained or improve  Outcome: Progressing: updated photos of heels and kyphoplasty site taken.       Problem: Diabetes Management  Goal: Patient's ability to maintain appropriate glucose levels will be maintained or improve  Outcome: Progressing: daily metformin 500 mg administered per MAR. ACHS FSBG d/c'd per MD.

## 2024-03-04 NOTE — CARE PLAN
"The patient is Stable - Low risk of patient condition declining or worsening    Shift Goals  Clinical Goals: safety  Patient Goals: Sleep well    Progress made toward(s) clinical / shift goals:  2    Patient is not progressing towards the following goals:    Problem: Knowledge Deficit - Standard  Goal: Patient and family/care givers will demonstrate understanding of plan of care, disease process/condition, diagnostic tests and medications  Outcome: Progressing: Pt given verbal education regarding POC, medications, therapy schedule, falls risk and prevention of falls, and verbalizes understanding.      Problem: Fall Risk - Rehab  Goal: Patient will remain free from falls  Outcome: Progressing  Note: Hanh Brennan Fall risk Assessment Score: 19    High fall risk Interventions   - Bed and strip alarm   - Yellow sign by the door   - Yellow wrist band \"Fall risk\"  - Do not leave patient unattended in the bathroom  - Fall risk education provided  Pt using call light appropriately for assist with transfers and personal needs, remains free from falls.       "

## 2024-03-04 NOTE — PROGRESS NOTES
Physical Medicine & Rehabilitation Progress Note    Encounter Date: 3/4/2024    Chief Complaint: Back is hurting    Interval Events (Subjective):  Vital signs: Labile 87 systolic to 163 systolic  Bowel movement 3/3  Voiding volitionally    Patient seen and examined in her room.  She states that her back is hurting a little bit.  She is wondering why she is sore.  She does have a lidocaine patch on it.  It is not limiting her from doing therapy.  She denies any new numbness or tingling.  Denies any changes in her bowel or bladder function.    ROS: 14 point ROS negative unless otherwise specified in the HPI    Objective:  VITAL SIGNS: BP (!) 158/74   Pulse 76   Temp 36.2 °C (97.1 °F) (Oral)   Resp 18   Wt 47.9 kg (105 lb 9.6 oz)   SpO2 94%   BMI 17.18 kg/m²     GEN: No apparent distress  HEENT: Head normocephalic, atraumatic.  Sclera nonicteric bilaterally, no ocular discharge appreciated bilaterally.  CV: Extremities warm and well-perfused, no peripheral edema appreciated bilaterally.  PULMONARY: Breathing nonlabored on room air, no respiratory accessory muscle use.  Not requiring supplemental oxygen.  SKIN: Blanching erythema bilateral heels.  PSYCH: Mood and affect within normal limits.  NEURO: Awake alert.  Conversational. Logical thought content.  Antigravity strength in bilateral lower extremities, can lift them up off of her wheelchair and extend and hold in extension for 45 seconds bilaterally.      Laboratory Values:  Recent Results (from the past 72 hour(s))   POCT glucose device results    Collection Time: 03/01/24 12:49 PM   Result Value Ref Range    POC Glucose, Blood 123 (H) 65 - 99 mg/dL   POCT glucose device results    Collection Time: 03/01/24  5:07 PM   Result Value Ref Range    POC Glucose, Blood 122 (H) 65 - 99 mg/dL   POCT glucose device results    Collection Time: 03/01/24  8:26 PM   Result Value Ref Range    POC Glucose, Blood 134 (H) 65 - 99 mg/dL   CBC with Differential    Collection  Time: 03/02/24  5:45 AM   Result Value Ref Range    WBC 8.5 4.8 - 10.8 K/uL    RBC 4.19 (L) 4.20 - 5.40 M/uL    Hemoglobin 13.1 12.0 - 16.0 g/dL    Hematocrit 39.7 37.0 - 47.0 %    MCV 94.7 81.4 - 97.8 fL    MCH 31.3 27.0 - 33.0 pg    MCHC 33.0 32.2 - 35.5 g/dL    RDW 44.4 35.9 - 50.0 fL    Platelet Count 443 164 - 446 K/uL    MPV 8.1 (L) 9.0 - 12.9 fL    Neutrophils-Polys 59.00 44.00 - 72.00 %    Lymphocytes 27.20 22.00 - 41.00 %    Monocytes 11.00 0.00 - 13.40 %    Eosinophils 1.60 0.00 - 6.90 %    Basophils 0.60 0.00 - 1.80 %    Immature Granulocytes 0.60 0.00 - 0.90 %    Nucleated RBC 0.00 0.00 - 0.20 /100 WBC    Neutrophils (Absolute) 5.03 1.82 - 7.42 K/uL    Lymphs (Absolute) 2.32 1.00 - 4.80 K/uL    Monos (Absolute) 0.94 (H) 0.00 - 0.85 K/uL    Eos (Absolute) 0.14 0.00 - 0.51 K/uL    Baso (Absolute) 0.05 0.00 - 0.12 K/uL    Immature Granulocytes (abs) 0.05 0.00 - 0.11 K/uL    NRBC (Absolute) 0.00 K/uL   Comp Metabolic Panel (CMP)    Collection Time: 03/02/24  5:45 AM   Result Value Ref Range    Sodium 135 135 - 145 mmol/L    Potassium 4.3 3.6 - 5.5 mmol/L    Chloride 96 96 - 112 mmol/L    Co2 26 20 - 33 mmol/L    Anion Gap 13.0 7.0 - 16.0    Glucose 116 (H) 65 - 99 mg/dL    Bun 11 8 - 22 mg/dL    Creatinine 0.51 0.50 - 1.40 mg/dL    Calcium 9.1 8.5 - 10.5 mg/dL    Correct Calcium 9.0 8.5 - 10.5 mg/dL    AST(SGOT) 19 12 - 45 U/L    ALT(SGPT) 13 2 - 50 U/L    Alkaline Phosphatase 137 (H) 30 - 99 U/L    Total Bilirubin 0.5 0.1 - 1.5 mg/dL    Albumin 4.1 3.2 - 4.9 g/dL    Total Protein 6.9 6.0 - 8.2 g/dL    Globulin 2.8 1.9 - 3.5 g/dL    A-G Ratio 1.5 g/dL   Magnesium    Collection Time: 03/02/24  5:45 AM   Result Value Ref Range    Magnesium 2.0 1.5 - 2.5 mg/dL   Phosphorus    Collection Time: 03/02/24  5:45 AM   Result Value Ref Range    Phosphorus 2.8 2.5 - 4.5 mg/dL   TSH with Reflex to FT4    Collection Time: 03/02/24  5:45 AM   Result Value Ref Range    TSH 2.170 0.380 - 5.330 uIU/mL   Vitamin D, 25-hydroxy  (blood)    Collection Time: 03/02/24  5:45 AM   Result Value Ref Range    25-Hydroxy   Vitamin D 25 61 30 - 100 ng/mL   ESTIMATED GFR    Collection Time: 03/02/24  5:45 AM   Result Value Ref Range    GFR (CKD-EPI) 89 >60 mL/min/1.73 m 2   POCT glucose device results    Collection Time: 03/02/24  7:20 AM   Result Value Ref Range    POC Glucose, Blood 127 (H) 65 - 99 mg/dL   POCT glucose device results    Collection Time: 03/02/24 11:32 AM   Result Value Ref Range    POC Glucose, Blood 118 (H) 65 - 99 mg/dL   POCT glucose device results    Collection Time: 03/02/24  5:33 PM   Result Value Ref Range    POC Glucose, Blood 111 (H) 65 - 99 mg/dL   POCT glucose device results    Collection Time: 03/02/24  9:26 PM   Result Value Ref Range    POC Glucose, Blood 142 (H) 65 - 99 mg/dL   POCT glucose device results    Collection Time: 03/03/24  7:18 AM   Result Value Ref Range    POC Glucose, Blood 147 (H) 65 - 99 mg/dL   CBC WITHOUT DIFFERENTIAL    Collection Time: 03/04/24  5:57 AM   Result Value Ref Range    WBC 7.3 4.8 - 10.8 K/uL    RBC 3.84 (L) 4.20 - 5.40 M/uL    Hemoglobin 11.9 (L) 12.0 - 16.0 g/dL    Hematocrit 35.8 (L) 37.0 - 47.0 %    MCV 93.2 81.4 - 97.8 fL    MCH 31.0 27.0 - 33.0 pg    MCHC 33.2 32.2 - 35.5 g/dL    RDW 43.6 35.9 - 50.0 fL    Platelet Count 380 164 - 446 K/uL    MPV 8.1 (L) 9.0 - 12.9 fL   Basic Metabolic Panel    Collection Time: 03/04/24  5:57 AM   Result Value Ref Range    Sodium 132 (L) 135 - 145 mmol/L    Potassium 4.3 3.6 - 5.5 mmol/L    Chloride 97 96 - 112 mmol/L    Co2 23 20 - 33 mmol/L    Glucose 148 (H) 65 - 99 mg/dL    Bun 11 8 - 22 mg/dL    Creatinine 0.39 (L) 0.50 - 1.40 mg/dL    Calcium 8.6 8.5 - 10.5 mg/dL    Anion Gap 12.0 7.0 - 16.0   ESTIMATED GFR    Collection Time: 03/04/24  5:57 AM   Result Value Ref Range    GFR (CKD-EPI) 95 >60 mL/min/1.73 m 2       Medications:  Scheduled Medications   Medication Dose Frequency    ciprofloxacin  250 mg Q12HRS    amLODIPine  10 mg DAILY     Pharmacy Consult Request  1 Each PHARMACY TO DOSE    omeprazole  20 mg DAILY    enoxaparin (LOVENOX) injection  30 mg DAILY AT 1800    gabapentin  100 mg TID    irbesartan  300 mg DAILY    lidocaine  1 Patch Q24HR    metFORMIN  500 mg DAILY    methocarbamol  500 mg 4X/DAY    rosuvastatin  10 mg DAILY    vitamin D3  1,000 Units DAILY     PRN medications: Respiratory Therapy Consult, hydrALAZINE, carboxymethylcellulose, benzocaine-menthol, mag hydrox-al hydrox-simeth, ondansetron **OR** ondansetron, traZODone, sodium chloride, acetaminophen, oxyCODONE immediate-release **OR** oxyCODONE immediate-release, polyethylene glycol/lytes    Diet:  Current Diet Order   Procedures    Diet Order Diet: Consistent CHO (Diabetic)       Medical Decision Making and Plan:  L2 compression fracture secondary to fall at [facility] 2/27/2024  S/p L2 Cementoplasty 2/29/2024 Dr. Sawyer  PT and OT for mobility and ADLs. Per guidelines, 15 hours per week between PT, OT and/or SLP.  Follow-up with PCP  Restrictions: No strenuous activity, heavy lifting, twisting for 1-2 weeks.  Do not lift anything greater than 7 pounds for 1-2 weeks.     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 Chew  Follow-up orthospine as an outpatient     Hyponatremia  Fluctuates greater than 130 3/4      Leukocytosis  Being treated for UTI  3/4 resolved     Anemia  3/4 fluctuant from 10.6 up to 13.1 down to 11.9 today  Monitor     Thrombocytosis  Resolved prior to admission  Resolved 3/4     Hypertension  Continue amlodipine 5 mg daily --> 10 mg daily 3/4  Continue Avapro 300 mg daily  3/4 blood pressure elevated, amlodipine increased per hospitalist.     Hyperlipidemia  Continue rosuvastatin 10 mg nightly     Type 2 diabetes mellitus with hyperglycemia  Sliding scale insulin discontinued.  Continue metformin 500 mg daily     Pain  As needed Tylenol  As needed oxycodone  As  needed ice  Scheduled Robaxin 500 mg 4 times daily  Scheduled lidocaine patch every 24 hours  Scheduled gabapentin 100 mg 3 times daily     Skin  Patient at risk for skin breakdown due to debility in areas including sacrum, achilles, elbows and head in addition to other sites. Nursing to assess skin daily.      GI Ppx  Patient on Prilosec for GERD prophylaxis.      Bowel   Continue MiraLAX 1 packet twice daily PRN     Bladder  Pseudomonas UTI (onset prior to admission)  TV/PVR/BS PRN  3/4 Ciprofloxacin 500 mg twice daily for 5-day course through 3/5.         Upcoming Labs/imaging: 3/7     DVT PROPHYLAXIS: Lovenox 30 mg subcutaneously nightly (adjusted for weight)     HOSPITALIST FOLLOWING: Discussed with hospitalist 3/4     CODE STATUS: DNR/DNI     DISPO: Home with spouse     JAKE: TBD     MEDS SENT TO: TBD     DISCHARGE SPECIALIST FOLLOW UP: Orthospine     Patient to scheduled follow up with their PCP within 2 weeks from discharge from the [facility].      ____________________________________    Dr. Wendy Pride DO, MS  [facility] - Physical Medicine & Rehabilitation   ____________________________________

## 2024-03-04 NOTE — THERAPY
Physical Therapy   Daily Treatment     Patient Name: Mariana Jett  Age:  89 y.o., Sex:  female  Medical Record #: [id number]  Today's Date: 3/4/2024     Precautions  Precautions: Fall Risk, Spinal / Back Precautions     Subjective    Pt up in wc, willing to participate but reports increased back pain after am session.     Objective       03/04/24 1001   PT Charge Group   PT Gait Training (Units) 1   PT Therapeutic Exercise (Units) 1   PT Total Time Spent   PT Individual Total Time Spent (Mins) 30   Gait Functional Level of Assist    Gait Level Of Assist Contact Guard Assist   Assistive Device Front Wheel Walker   Distance (Feet) 80   # of Times Distance was Traveled 2   Deviation Decreased Base Of Support;Bradykinetic   Sitting Lower Body Exercises   Sitting Lower Body Exercises Yes   Ankle Pumps 3 sets of 10   Hip Flexion 3 sets of 10   Hip Abduction 3 sets of 10   Hip Adduction 3 sets of 10   Long Arc Quad 3 sets of 10   Hamstring Curl 3 sets of 10     Disposable hot pack to low back during seated exercises between bouts of ambulation.     Assessment    Tolerated gait endurance and light strength training well despite increased back pain this session, pleasant and cooperative throughout therapies.     Strengths: Able to follow instructions, Alert and oriented, Manages pain appropriately, Motivated for self care and independence, Pleasant and cooperative, Supportive family, Willingly participates in therapeutic activities  Barriers: Decreased endurance, Fatigue, Impaired activity tolerance, Impaired balance, Pain, Generalized weakness    Plan    Gait training with FWW, balance training, stair training, overall activity tolerance/endurance/ strength training.    DME       Passport items to be completed:  Get in/out of bed safely, in/out of a vehicle, safely use mobility device, walk or wheel around home/community, navigate up and down stairs, show how to get up/down from the ground, ensure home is accessible, demonstrate  HEP, complete caregiver training    Physical Therapy Problems (Active)       Problem: Balance       Dates: Start:  03/02/24         Goal: STG-Within one week, patient will maintain static standing balance with narrow LISETH and no UE support, for at least 30 seconds.        Dates: Start:  03/02/24               Problem: Mobility       Dates: Start:  03/02/24         Goal: STG-Within one week, patient will ambulate at least 100 feet with LRAD and CGA.        Dates: Start:  03/02/24            Goal: STG-Within one week, patient will ambulate up/down a curb with LRAD and min A.        Dates: Start:  03/02/24            Goal: STG-Within one week, patient will ascend and descend four to six stairs with rails and CGA.        Dates: Start:  03/02/24               Problem: PT-Long Term Goals       Dates: Start:  03/02/24         Goal: LTG-By discharge, patient will ambulate at least 150 feet with LRAD and supervision.        Dates: Start:  03/02/24            Goal: LTG-By discharge, patient will transfer one surface to another with LRAD and supervision.        Dates: Start:  03/02/24            Goal: LTG-By discharge, patient will ambulate up/down 4-6 stairs with B rails and SBA.        Dates: Start:  03/02/24            Goal: LTG-By discharge, patient will transfer in/out of a car with LRAD and SBA.        Dates: Start:  03/02/24

## 2024-03-04 NOTE — PROGRESS NOTES
NURSING DAILY NOTE    Name: Mariana Jett   Date of Admission: 3/1/2024   Admitting Diagnosis: Closed compression fracture of L2 lumbar vertebra, initial encounter ([facility])  Attending Physician: Wendy Pride D.o.  Allergies: Patient has no known allergies.    Safety  Patient Assist     Patient Precautions  Fall Risk, Spinal / Back Precautions   Precaution Comments     Bed Transfer Status  Contact Guard Assist  Toilet Transfer Status   Contact Guard Assist  Assistive Devices  Walker - front wheel, Rails  Oxygen  None - Room Air  Diet/Therapeutic Dining  Current Diet Order   Procedures    Diet Order Diet: Consistent CHO (Diabetic)     Pill Administration  whole  Agitated Behavioral Scale     ABS Level of Severity       Fall Risk  Has the patient had a fall this admission?   No  Hanh Brennan Fall Risk Scoring  15, HIGH RISK  Fall Risk Safety Measures  Bed strip alarm    Vitals  Temperature: 36.2 °C (97.1 °F)  Temp src: Oral  Pulse: 76  Respiration: 18  Blood Pressure : (!) 158/74  Blood Pressure MAP (Calculated): 102 MM HG  BP Location: Right, Upper Arm  Patient BP Position: Supine     Oxygen  Pulse Oximetry: 94 %  O2 (LPM): 0  O2 Delivery Device: None - Room Air    Bowel and Bladder  Last Bowel Movement  03/03/24  Stool Type  Type 4: Like a sausage or snake, smooth and soft  Bowel Device     Continent  Bladder: Did not void   Bowel: No movement  Bladder Function  Urine Void (mL):  (large)  Number of Times Voided: 1  Urinary Options: Yes  Urine Color: Yellow  Genitourinary Assessment   Bladder Assessment (WDL):  Within Defined Limits  Hartman Catheter: Not Applicable  Urine Color: Yellow  Bladder Device: Bathroom  Time Void: Yes  Bladder Scan: Post Void  $ Bladder Scan Results (mL): 1    Skin  Michael Score   18  Sensory Interventions   Bed Types: Standard/Trauma Mattress with Overlay  Skin Preventative Measures: Pillows in Use for Support / Positioning, Waffle  Overlay  Moisture Interventions         Pain  Pain Rating Scale  4 - Distracts me, can do usual activities  Pain Location  Back  Pain Location Orientation  Mid  Pain Interventions   Rest    ADLs    Bathing   Shower, Staff (Shower with Ot )  Linen Change      Personal Hygiene     Chlorhexidine Bath      Oral Care  Brushed Teeth  Teeth/Dentures     Shave     Nutrition Percentage Eaten  Lunch, Between 25-50% Consumed  Environmental Precautions     Patient Turns/Positioning  Patient Turns Self from Side to Side  Patient Turns Assistance/Tolerance     Bed Positions     Head of Bed Elevated         Psychosocial/Neurologic Assessment  Psychosocial Assessment  Psychosocial (WDL):  Within Defined Limits  Neurologic Assessment  Neuro (WDL): Exceptions to WDL  Level of Consciousness: Alert  Orientation Level: Oriented to place, Oriented to situation, Oriented to person  Cognition: Appropriate judgement, Appropriate attention/concentration  Speech: Clear  Muscle Strength Right Arm: Good Strength Against Gravity and Moderate Resistance  Muscle Strength Left Arm: Good Strength Against Gravity and Moderate Resistance  Muscle Strength Right Leg: Good Strength Against Gravity and Moderate Resistance  Muscle Strength Left Leg: Good Strength Against Gravity and Moderate Resistance  EENT (WDL):  WDL Except    Cardio/Pulmonary Assessment  Edema      Respiratory Breath Sounds  RUL Breath Sounds: Clear  RML Breath Sounds: Clear  RLL Breath Sounds: Clear, Diminished  IRAJ Breath Sounds: Clear  LLL Breath Sounds: Clear, Diminished  Cardiac Assessment   Cardiac (WDL):  WDL Except (H/O HTN.)

## 2024-03-04 NOTE — CARE PLAN
"  Problem: Knowledge Deficit - Standard  Goal: Patient and family/care givers will demonstrate understanding of plan of care, disease process/condition, diagnostic tests and medications  Outcome: Progressing  Note: Pt agrees with plan of care tonight regarding medications and safety.  Will continue to monitor patient.     Problem: Fall Risk - Rehab  Goal: Patient will remain free from falls  Outcome: Progressing  Note: Hanh Brennan Fall risk Assessment Score: 15    High fall risk Interventions   - Alarming seatbelt  - Wander guard  - Bed and strip alarm   - Yellow sign by the door   - Yellow wrist band \"Fall risk\"  - Room near to the nurse station  - Do not leave patient unattended in the bathroom  - Fall risk education provided         The patient is Stable - Low risk of patient condition declining or worsening    Shift Goals  Clinical Goals: Safety  Patient Goals: Sleep well    Progress made toward(s) clinical / shift goals:  progressing          "

## 2024-03-04 NOTE — PROGRESS NOTES
Received bedside shift report from Jaylyn ZAVALETA RN regarding patient and assumed care. Patient awake, calm and stable, currently positioned in bed for comfort and safety; call light within reach. Denies pain or discomfort at this time. Will continue to monitor.

## 2024-03-04 NOTE — THERAPY
Physical Therapy   Daily Treatment     Patient Name: Mariana Jett  Age:  89 y.o., Sex:  female  Medical Record #: [id number]  Today's Date: 3/4/2024     Precautions  Precautions: Fall Risk, Spinal / Back Precautions     Subjective    Pt up in wc, willing to participate.     Objective       03/04/24 0831   PT Charge Group   PT Gait Training (Units) 2   PT Therapeutic Exercise (Units) 1   PT Neuromuscular Re-Education / Balance (Units) 1   PT Total Time Spent   PT Individual Total Time Spent (Mins) 60   Gait Functional Level of Assist    Gait Level Of Assist Contact Guard Assist   Assistive Device Front Wheel Walker   Distance (Feet) 50  (in addition to 80 ft x 1 with 4WW/ CGA-min A for safety)   # of Times Distance was Traveled 2   Deviation Decreased Base Of Support;Bradykinetic   Transfer Functional Level of Assist   Toilet Transfers Supervised   Toilet Transfer Description Adaptive equipment;Grab bar;Increased time;Set-up of equipment   Sitting Lower Body Exercises   Sitting Lower Body Exercises Yes   Nustep Resistance Level 4  (5 mnutes for general strength/ endurance training.)   Neuro-Muscular Treatments   Neuro-Muscular Treatments Anterior weight shift;Postural Facilitation;Sequencing;Tactile Cuing;Verbal Cuing;Weight Shift Right;Weight Shift Left   Comments Static and dynamic tanding balance activity.     Dynamic balance activity with UE support at // bars for anterior/ posterior and lateral wt shifting activity stepping forward/back and side stepping over 2 dowels with target dots for increased step length all planes x 10 reps.    Static standing balance in stable stance / arms crossed x 30 sec, head turns/ head nods x 10 reps with SBA. Repeated in Airex foam pad with close SBA / light CGA but no LOB noted.       Assessment    Pt tolerated gait endurance and standing balance well, remains with narrow LISETH during gait despite verbal cues. Pt reported mild increased back pain with standing balance/ posture training.      Strengths: Able to follow instructions, Alert and oriented, Manages pain appropriately, Motivated for self care and independence, Pleasant and cooperative, Supportive family, Willingly participates in therapeutic activities  Barriers: Decreased endurance, Fatigue, Impaired activity tolerance, Impaired balance, Pain, Generalized weakness    Plan    Gait training with FWW, balance training, stair training, overall activity tolerance/endurance/ strength training.    DME       Passport items to be completed:  Get in/out of bed safely, in/out of a vehicle, safely use mobility device, walk or wheel around home/community, navigate up and down stairs, show how to get up/down from the ground, ensure home is accessible, demonstrate HEP, complete caregiver training    Physical Therapy Problems (Active)       Problem: Balance       Dates: Start:  03/02/24         Goal: STG-Within one week, patient will maintain static standing balance with narrow LISETH and no UE support, for at least 30 seconds.        Dates: Start:  03/02/24               Problem: Mobility       Dates: Start:  03/02/24         Goal: STG-Within one week, patient will ambulate at least 100 feet with LRAD and CGA.        Dates: Start:  03/02/24            Goal: STG-Within one week, patient will ambulate up/down a curb with LRAD and min A.        Dates: Start:  03/02/24            Goal: STG-Within one week, patient will ascend and descend four to six stairs with rails and CGA.        Dates: Start:  03/02/24               Problem: PT-Long Term Goals       Dates: Start:  03/02/24         Goal: LTG-By discharge, patient will ambulate at least 150 feet with LRAD and supervision.        Dates: Start:  03/02/24            Goal: LTG-By discharge, patient will transfer one surface to another with LRAD and supervision.        Dates: Start:  03/02/24            Goal: LTG-By discharge, patient will ambulate up/down 4-6 stairs with B rails and SBA.        Dates: Start:   03/02/24            Goal: LTG-By discharge, patient will transfer in/out of a car with LRAD and SBA.        Dates: Start:  03/02/24

## 2024-03-04 NOTE — THERAPY
Occupational Therapy  Daily Treatment     Patient Name: Mariana Jett  Age:  89 y.o., Sex:  female  Medical Record #: [id number]  Today's Date: 3/4/2024     Precautions  Precautions: Fall Risk, Spinal / Back Precautions          Subjective  Pt supine in bed upon arrival, agreeable to OT session with encouragement.      Objective     03/04/24 1401   OT Charge Group   OT Therapeutic Exercise (Units) 2   OT Total Time Spent   OT Individual Total Time Spent (Mins) 30   Functional Level of Assist   Lower Body Dressing Standby Assist  (shoes with SBA and use of shoe horn while seated EOB)   Lower Body Dressing Description Shoe horn;Verbal cueing   Bed, Chair, Wheelchair Transfer Standby Assist  (EOB <> w/c via FWW)   Bed Chair Wheelchair Transfer Description Adaptive equipment;Increased time;Set-up of equipment;Supervision for safety   Bed Mobility    Supine to Sit Standby Assist   Sit to Supine Standby Assist   Interdisciplinary Plan of Care Collaboration   IDT Collaboration with  Nursing   Patient Position at End of Therapy Seated;Bed Alarm On;Call Light within Reach;Tray Table within Reach   Collaboration Comments pt declined Prevalon boots     Pt completed balance and fxl mobility activity in // bars with external cues to promote step through gait pattern. Pt completed 2x bouts in // bars across agility ladder with step-to pattern and 2x bouts with step through and verbal cueing. Pt stepped over objects in small obstacle course in // bars. Required SBA for all // bar mobility.     Pt informed on benefits of Prevalon boot for pressure wound protection. She refused to don boots in bed.     Assessment  Pt is resistant to using Prevalon boot for pressure wound protection on heels despite encouragement and education, assisted to bed with heels floated on pillow. She benefited from external cueing to advance gait. She continues to present with some impaired insight with d/c plan and POC.   Strengths: Able to follow instructions,  Effective communication skills, Good insight into deficits/needs, Independent prior level of function, Motivated for self care and independence, Pleasant and cooperative, Willingly participates in therapeutic activities  Barriers: Generalized weakness, Impaired balance, Impaired activity tolerance, Pain, Decreased endurance, Bladder incontinence    Plan  Shower tomorrow 3/5  ADLs  Standing tolerance/balance  Strengthening, endurance    DME       Passport items to be completed:  Perform bathroom transfers, complete dressing, complete feeding, get ready for the day, prepare a simple meal, participate in household tasks, adapt home for safety needs, demonstrate home exercise program, complete caregiver training     Occupational Therapy Goals (Active)       Problem: Dressing       Dates: Start:  03/02/24         Goal: STG-Within one week, patient will dress LB       Dates: Start:  03/02/24       Description: At a Min A level with AE/AD PRN and ongoing adherence to spinal precautions.            Problem: IADL's       Dates: Start:  03/02/24         Goal: STG-Within one week, patient will access kitchen area       Dates: Start:  03/02/24       Description: At a SUP level with LRD and seated RB as needed.             Problem: OT Long Term Goals       Dates: Start:  03/02/24         Goal: LTG-By discharge, patient will complete basic self care tasks       Dates: Start:  03/02/24       Description: At a Min A to Mod I level with AE/AD/DME PRN.          Goal: LTG-By discharge, patient will perform bathroom transfers       Dates: Start:  03/02/24       Description: At a SUP to Mod I level with AE/AD/DME PRN.             Problem: Toileting       Dates: Start:  03/02/24         Goal: STG-Within one week, patient will complete toileting tasks       Dates: Start:  03/02/24       Description: a Min A level with AE/AD PRN and ongoing adherence to spinal precautions.

## 2024-03-04 NOTE — PROGRESS NOTES
NURSING DAILY NOTE    Name: Mariana Jett   Date of Admission: 3/1/2024   Admitting Diagnosis: Closed compression fracture of L2 lumbar vertebra, initial encounter ([facility])  Attending Physician: Wendy Pride D.o.  Allergies: Patient has no known allergies.    Safety  Patient Assist     Patient Precautions  Fall Risk, Spinal / Back Precautions   Precaution Comments     Bed Transfer Status  Contact Guard Assist  Toilet Transfer Status   Supervised  Assistive Devices  Walker - front wheel, Rails  Oxygen  None - Room Air  Diet/Therapeutic Dining  Current Diet Order   Procedures    Diet Order Diet: Consistent CHO (Diabetic)     Pill Administration  whole  Agitated Behavioral Scale     ABS Level of Severity       Fall Risk  Has the patient had a fall this admission?   No  Hanh Brennan Fall Risk Scoring  19, HIGH RISK  Fall Risk Safety Measures  bed alarm, chair alarm, poor balance, and low vision/ hearing    Vitals  Temperature: 36.4 °C (97.5 °F)  Temp src: Temporal  Pulse: 82  Respiration: 18  Blood Pressure : 111/65  Blood Pressure MAP (Calculated): 80 MM HG  BP Location: Right, Upper Arm  Patient BP Position: Sitting     Oxygen  Pulse Oximetry: 98 %  O2 (LPM): 0  O2 Delivery Device: None - Room Air    Bowel and Bladder  Last Bowel Movement  03/03/24  Stool Type  Type 4: Like a sausage or snake, smooth and soft  Bowel Device     Continent  Bladder: Did not void   Bowel: No movement  Bladder Function  Urine Void (mL):  (large)  Number of Times Voided: 1  Urinary Options: Yes  Urine Color: Yellow  Genitourinary Assessment   Bladder Assessment (WDL):  Within Defined Limits  Hartman Catheter: Not Applicable  Urine Color: Yellow  Bladder Device: Bathroom  Time Void: Yes  Bladder Scan: Post Void  $ Bladder Scan Results (mL): 1    Skin  Michael Score   18  Sensory Interventions   Bed Types: Standard/Trauma Mattress  Skin Preventative Measures: Heel Float Boots in Use    Moisture Interventions         Pain  Pain Rating Scale  10 - As bad as it could be, nothing else matters  Pain Location  Back  Pain Location Orientation  Lower  Pain Interventions   Food, Medication (see MAR) (scheduled gabapentin and robaxin, declines prn)    ADLs    Bathing   Shower, Staff (Shower with Ot )  Linen Change      Personal Hygiene     Chlorhexidine Bath      Oral Care  Brushed Teeth  Teeth/Dentures     Shave     Nutrition Percentage Eaten  Lunch, Between 25-50% Consumed  Environmental Precautions     Patient Turns/Positioning  Patient Turns Self from Side to Side  Patient Turns Assistance/Tolerance     Bed Positions     Head of Bed Elevated         Psychosocial/Neurologic Assessment  Psychosocial Assessment  Psychosocial (WDL):  Within Defined Limits  Neurologic Assessment  Neuro (WDL): Exceptions to WDL  Level of Consciousness: Alert  Orientation Level: Oriented to place, Oriented to situation, Oriented to person  Cognition: Appropriate judgement, Appropriate attention/concentration  Speech: Clear  Muscle Strength Right Arm: Good Strength Against Gravity and Moderate Resistance  Muscle Strength Left Arm: Good Strength Against Gravity and Moderate Resistance  Muscle Strength Right Leg: Good Strength Against Gravity and Moderate Resistance  Muscle Strength Left Leg: Good Strength Against Gravity and Moderate Resistance  EENT (WDL):  WDL Except    Cardio/Pulmonary Assessment  Edema      Respiratory Breath Sounds  RUL Breath Sounds: Clear  RML Breath Sounds: Clear  RLL Breath Sounds: Clear  IRAJ Breath Sounds: Clear  LLL Breath Sounds: Clear  Cardiac Assessment   Cardiac (WDL):  Within Defined Limits

## 2024-03-04 NOTE — PROGRESS NOTES
Hospital Medicine Daily Progress Note        Chief Complaint  Hypertension  Diabetes    Interval Problem Update  Blood pressures still high; pt asymptomatic.  Labs reviewed and discussed.    Review of Systems  Review of Systems   Constitutional:  Negative for chills and fever.   HENT: Negative.     Eyes: Negative.    Respiratory:  Negative for cough and shortness of breath.    Cardiovascular:  Negative for chest pain and palpitations.   Gastrointestinal:  Negative for abdominal pain, nausea and vomiting.   Musculoskeletal:  Positive for back pain.   Skin:  Negative for itching and rash.   Endo/Heme/Allergies:  Negative for polydipsia. Does not bruise/bleed easily.        Physical Exam  Temp:  [36.2 °C (97.1 °F)-36.6 °C (97.9 °F)] 36.2 °C (97.1 °F)  Pulse:  [76-77] 76  Resp:  [16-18] 18  BP: (158-160)/(73-74) 158/74  SpO2:  [94 %-95 %] 94 %    Physical Exam  Vitals reviewed.   Constitutional:       General: She is not in acute distress.     Appearance: Normal appearance. She is not ill-appearing.   HENT:      Head: Normocephalic and atraumatic.      Right Ear: External ear normal.      Left Ear: External ear normal.      Nose: Nose normal.      Mouth/Throat:      Pharynx: Oropharynx is clear.   Eyes:      General:         Right eye: No discharge.         Left eye: No discharge.      Extraocular Movements: Extraocular movements intact.      Conjunctiva/sclera: Conjunctivae normal.   Cardiovascular:      Rate and Rhythm: Normal rate and regular rhythm.   Pulmonary:      Effort: Pulmonary effort is normal. No respiratory distress.      Breath sounds: Normal breath sounds. No wheezing.   Abdominal:      General: Bowel sounds are normal. There is no distension.      Palpations: Abdomen is soft.      Tenderness: There is no abdominal tenderness.   Musculoskeletal:      Cervical back: Normal range of motion and neck supple.      Right lower leg: No edema.      Left lower leg: No edema.   Skin:     General: Skin is warm and  dry.   Neurological:      Mental Status: She is alert and oriented to person, place, and time.         Fluids    Intake/Output Summary (Last 24 hours) at 3/4/2024 1107  Last data filed at 3/4/2024 0840  Gross per 24 hour   Intake 530 ml   Output --   Net 530 ml       Laboratory  Recent Labs     03/02/24  0545 03/04/24  0557   WBC 8.5 7.3   RBC 4.19* 3.84*   HEMOGLOBIN 13.1 11.9*   HEMATOCRIT 39.7 35.8*   MCV 94.7 93.2   MCH 31.3 31.0   MCHC 33.0 33.2   RDW 44.4 43.6   PLATELETCT 443 380   MPV 8.1* 8.1*     Recent Labs     03/02/24  0545 03/04/24  0557   SODIUM 135 132*   POTASSIUM 4.3 4.3   CHLORIDE 96 97   CO2 26 23   GLUCOSE 116* 148*   BUN 11 11   CREATININE 0.51 0.39*   CALCIUM 9.1 8.6                   Assessment/Plan  Lumbar compression fracture, closed, initial encounter ([facility])- (present on admission)  Assessment & Plan  2/2 GLF  S/P L2 kyphoplasty on 2/29/24 by IR  Wound care and pain control per Physiatry    UTI (urinary tract infection)- (present on admission)  Assessment & Plan  UCx PSAR  Complete Cipro course    Dyslipidemia- (present on admission)  Assessment & Plan  On Crestor    Diabetes mellitus type II, controlled ([facility])- (present on admission)  Assessment & Plan  HbA1c 6.4  Serum glucose well controlled on Metformin  FSBS and SSI discontinued  Outpt meds include Metformin 500 mg daily usually, but pt reports she takes twice daily as needed if FSBS >150    Hypertension- (present on admission)  Assessment & Plan  On Norvasc and Irbesartan  Observe blood pressure trends on increased Norvasc      DNR

## 2024-03-04 NOTE — WOUND TEAM
[facility]  Inpatient Services  Wound and Skin Care Brief Evaluation    Admission Date: 3/1/2024     Last order of IP CONSULT TO WOUND CARE was found on 3/1/2024 from Hospital Encounter on 3/1/2024     HPI, PMH, SH: Reviewed    No chief complaint on file.    Diagnosis: Lumbar compression fracture, closed, initial encounter ([facility]) [S32.000A]    Unit where seen by Wound Team: 21/02     Wound consult placed regarding Left heel. Chart and images reviewed. This discussed with bedside RN Jaylyn and Dr. Pride. This clinician in to assess patient. Patient pleasant and agreeable. Bilateral heels are have blanchable erythema.    No pressure injuries or advanced wound care needs identified. Wound consult completed. No further follow up unless indicated and consulted.     Wound 03/01/24 Heel Left L Heel (Active)   Date First Assessed/Time First Assessed: 03/01/24 1500   Present on Original Admission: Yes  Location: Heel  Laterality: Left  Wound Description (Comments): L Heel      Assessments 3/4/2024  9:00 AM   Wound Image     Site Assessment Clean;Dry;Intact   Periwound Assessment Blanchable erythema   Closure Open to air   Drainage Amount None   Treatments Offloading   Wound Cleansing Approved Wound Cleanser   Periwound Protectant Not Applicable   Dressing Status Open to Air   Wound Team Following Not following   WOUND NURSE ONLY - Time Spent with Patient (mins) 20     Right heel:      PREVENTATIVE INTERVENTIONS:    Q shift Michael - performed per nursing policy  Q shift pressure point assessments - performed per nursing policy    Surface/Positioning  Waffle overlay  - Currently in Place    Offloading/Redistribution  Heel float boots (Prevalon boot) - Ordered  Heels floated with waffle overlay - Currently in Place  Float Heels off Bed with Pillows - Nursing communication placed         Mobilization      Working with therapies

## 2024-03-04 NOTE — THERAPY
"Occupational Therapy  Daily Treatment     Patient Name: Mariana Jett  Age:  89 y.o., Sex:  female  Medical Record #: [id number]  Today's Date: 3/4/2024     Precautions  Precautions: Fall Risk, Spinal / Back Precautions          Subjective  Pt retrieved in w/c, pleasant and agreeable to OT session.   Reported that she does not know where she is discharging to, but later reported that she thinks she will go to her daughters.      Objective     03/04/24 1031   OT Charge Group   OT Self Care / ADL (Units) 3   OT Cognitive Skill Development First 15 Minutes (Units) 1   OT Total Time Spent   OT Individual Total Time Spent (Mins) 60   Vitals   O2 Delivery Device None - Room Air   Interdisciplinary Plan of Care Collaboration   Patient Position at End of Therapy Seated;Chair Alarm On;Self Releasing Lap Belt Applied;Call Light within Reach;Tray Table within Reach     Pt presented with questions re: d/c, POC, and required support at home. Pt endorsed that she previously benefited from externally contracted assistance with IADLs and ADLs at home. She thinks that she is discharging with her daughter, but is unsure of how much assist her daughter can provide. Pt unable to recall daughter's bathroom set-up; will require follow-up to ensure safety with d/c planning.     Pt completed Awareness Questionnaire Patient Form. Pt endorsed \"about the same\" compared to before injury for all categories, except: \"a little worse\" for \"ability to manage money now\", \"how well you can do the things you want to do now\", and \"how well adjusted emotionally\". She reported that she \"gets frustrated that her mind and body do not work together well\".     Pt completed fxl mobility with SBA and verbal cues for proximity of FWW from back gym > bedroom with fair dual task performance AEB discussion re: d/c planning during mobility. She completed grocery retrieval at FWW level to address fxl mobility and memory deficits. She retrieved 3/3 groceries, demonstrating " fair working memory.     Pt completed mock LB dressing task with theraband loop to simulate pants from w/c level with SBA throuhgout. She did not use reacher to thread BLE into theraband loop but demonstrated good adherence to spinal precautions. She has fair seated mobility in BLE to complete figure 4 and hip flexion/march to assist with LB dressing without AE.     Assessment  Pt presented with inconsistent responses for d/c plans; is a questionable historian. She demonstrated good performance with STS at FWW level but poor balance during fxl mobility with L hip IR and narrow ILSETH.   Strengths: Able to follow instructions, Effective communication skills, Good insight into deficits/needs, Independent prior level of function, Motivated for self care and independence, Pleasant and cooperative, Willingly participates in therapeutic activities  Barriers: Generalized weakness, Impaired balance, Impaired activity tolerance, Pain, Decreased endurance, Bladder incontinence    Plan  Shower tomorrow 3/5  ADLs  Standing tolerance/balance  Strengthening, endurance    DME       Passport items to be completed:  Perform bathroom transfers, complete dressing, complete feeding, get ready for the day, prepare a simple meal, participate in household tasks, adapt home for safety needs, demonstrate home exercise program, complete caregiver training     Occupational Therapy Goals (Active)       Problem: Dressing       Dates: Start:  03/02/24         Goal: STG-Within one week, patient will dress LB       Dates: Start:  03/02/24       Description: At a Min A level with AE/AD PRN and ongoing adherence to spinal precautions.            Problem: IADL's       Dates: Start:  03/02/24         Goal: STG-Within one week, patient will access kitchen area       Dates: Start:  03/02/24       Description: At a SUP level with LRD and seated RB as needed.             Problem: OT Long Term Goals       Dates: Start:  03/02/24         Goal: LTG-By  discharge, patient will complete basic self care tasks       Dates: Start:  03/02/24       Description: At a Min A to Mod I level with AE/AD/DME PRN.          Goal: LTG-By discharge, patient will perform bathroom transfers       Dates: Start:  03/02/24       Description: At a SUP to Mod I level with AE/AD/DME PRN.             Problem: Toileting       Dates: Start:  03/02/24         Goal: STG-Within one week, patient will complete toileting tasks       Dates: Start:  03/02/24       Description: a Min A level with AE/AD PRN and ongoing adherence to spinal precautions.

## 2024-03-05 ENCOUNTER — APPOINTMENT (OUTPATIENT)
Dept: PHYSICAL THERAPY | Facility: REHABILITATION | Age: 89
DRG: 560 | End: 2024-03-05
Attending: PHYSICAL MEDICINE & REHABILITATION
Payer: MEDICARE

## 2024-03-05 ENCOUNTER — APPOINTMENT (OUTPATIENT)
Dept: OCCUPATIONAL THERAPY | Facility: REHABILITATION | Age: 89
DRG: 560 | End: 2024-03-05
Attending: PHYSICAL MEDICINE & REHABILITATION
Payer: MEDICARE

## 2024-03-05 PROCEDURE — 99232 SBSQ HOSP IP/OBS MODERATE 35: CPT | Performed by: PHYSICAL MEDICINE & REHABILITATION

## 2024-03-05 PROCEDURE — 700101 HCHG RX REV CODE 250: Performed by: PHYSICAL MEDICINE & REHABILITATION

## 2024-03-05 PROCEDURE — 700102 HCHG RX REV CODE 250 W/ 637 OVERRIDE(OP): Performed by: HOSPITALIST

## 2024-03-05 PROCEDURE — A9270 NON-COVERED ITEM OR SERVICE: HCPCS | Performed by: PHYSICAL MEDICINE & REHABILITATION

## 2024-03-05 PROCEDURE — 97112 NEUROMUSCULAR REEDUCATION: CPT

## 2024-03-05 PROCEDURE — 770010 HCHG ROOM/CARE - REHAB SEMI PRIVAT*

## 2024-03-05 PROCEDURE — 97530 THERAPEUTIC ACTIVITIES: CPT

## 2024-03-05 PROCEDURE — 99231 SBSQ HOSP IP/OBS SF/LOW 25: CPT | Performed by: HOSPITALIST

## 2024-03-05 PROCEDURE — 700102 HCHG RX REV CODE 250 W/ 637 OVERRIDE(OP): Performed by: PHYSICAL MEDICINE & REHABILITATION

## 2024-03-05 PROCEDURE — 700111 HCHG RX REV CODE 636 W/ 250 OVERRIDE (IP): Performed by: PHYSICAL MEDICINE & REHABILITATION

## 2024-03-05 PROCEDURE — 97110 THERAPEUTIC EXERCISES: CPT

## 2024-03-05 PROCEDURE — 97535 SELF CARE MNGMENT TRAINING: CPT

## 2024-03-05 PROCEDURE — A9270 NON-COVERED ITEM OR SERVICE: HCPCS | Performed by: HOSPITALIST

## 2024-03-05 PROCEDURE — 97116 GAIT TRAINING THERAPY: CPT

## 2024-03-05 RX ADMIN — METFORMIN HYDROCHLORIDE 500 MG: 500 TABLET ORAL at 08:04

## 2024-03-05 RX ADMIN — ROSUVASTATIN CALCIUM 10 MG: 10 TABLET, COATED ORAL at 08:04

## 2024-03-05 RX ADMIN — Medication 1000 UNITS: at 08:04

## 2024-03-05 RX ADMIN — GABAPENTIN 100 MG: 100 CAPSULE ORAL at 21:29

## 2024-03-05 RX ADMIN — GABAPENTIN 100 MG: 100 CAPSULE ORAL at 08:04

## 2024-03-05 RX ADMIN — AMLODIPINE BESYLATE 10 MG: 5 TABLET ORAL at 05:16

## 2024-03-05 RX ADMIN — CIPROFLOXACIN 250 MG: 500 TABLET, FILM COATED ORAL at 08:04

## 2024-03-05 RX ADMIN — IRBESARTAN 300 MG: 150 TABLET ORAL at 05:16

## 2024-03-05 RX ADMIN — METHOCARBAMOL 500 MG: 500 TABLET ORAL at 14:16

## 2024-03-05 RX ADMIN — METHOCARBAMOL 500 MG: 500 TABLET ORAL at 16:19

## 2024-03-05 RX ADMIN — METHOCARBAMOL 500 MG: 500 TABLET ORAL at 21:29

## 2024-03-05 RX ADMIN — METHOCARBAMOL 500 MG: 500 TABLET ORAL at 08:04

## 2024-03-05 RX ADMIN — OMEPRAZOLE 20 MG: 20 CAPSULE, DELAYED RELEASE ORAL at 08:04

## 2024-03-05 RX ADMIN — OXYCODONE 5 MG: 5 TABLET ORAL at 08:30

## 2024-03-05 RX ADMIN — LIDOCAINE 1 PATCH: 4 PATCH TOPICAL at 05:17

## 2024-03-05 RX ADMIN — ENOXAPARIN SODIUM 30 MG: 100 INJECTION SUBCUTANEOUS at 16:19

## 2024-03-05 RX ADMIN — OXYCODONE 5 MG: 5 TABLET ORAL at 21:29

## 2024-03-05 RX ADMIN — GABAPENTIN 100 MG: 100 CAPSULE ORAL at 14:16

## 2024-03-05 ASSESSMENT — GAIT ASSESSMENTS
ASSISTIVE DEVICE: FRONT WHEEL WALKER
DEVIATION: DECREASED BASE OF SUPPORT;BRADYKINETIC;DECREASED HEEL STRIKE;DECREASED TOE OFF
ASSISTIVE DEVICE: FRONT WHEEL WALKER
GAIT LEVEL OF ASSIST: STANDBY ASSIST
DISTANCE (FEET): 200
DEVIATION: DECREASED BASE OF SUPPORT;BRADYKINETIC
DISTANCE (FEET): 100
GAIT LEVEL OF ASSIST: STANDBY ASSIST

## 2024-03-05 ASSESSMENT — PAIN DESCRIPTION - PAIN TYPE
TYPE: ACUTE PAIN;SURGICAL PAIN
TYPE: ACUTE PAIN
TYPE: ACUTE PAIN

## 2024-03-05 ASSESSMENT — ENCOUNTER SYMPTOMS
ABDOMINAL PAIN: 0
NERVOUS/ANXIOUS: 0
VOMITING: 0
DIARRHEA: 0
NAUSEA: 0
FEVER: 0
CHILLS: 0
SHORTNESS OF BREATH: 0

## 2024-03-05 ASSESSMENT — ACTIVITIES OF DAILY LIVING (ADL)
TOILET_TRANSFER_DESCRIPTION: ADAPTIVE EQUIPMENT;GRAB BAR;INCREASED TIME;SET-UP OF EQUIPMENT
BED_CHAIR_WHEELCHAIR_TRANSFER_DESCRIPTION: ADAPTIVE EQUIPMENT;INCREASED TIME;SET-UP OF EQUIPMENT
BED_CHAIR_WHEELCHAIR_TRANSFER_DESCRIPTION: ADAPTIVE EQUIPMENT;INCREASED TIME;SET-UP OF EQUIPMENT
BED_CHAIR_WHEELCHAIR_TRANSFER_DESCRIPTION: ADAPTIVE EQUIPMENT;INCREASED TIME;SET-UP OF EQUIPMENT;VERBAL CUEING

## 2024-03-05 NOTE — CARE PLAN
Problem: Self Care  Goal: Patient will have the ability to perform ADLs independently or with assistance  Outcome: Progressing  Note: Patient able to perform regular activities this shift.  Pain controlled this shift.  Pain management includes PRN pain meds as well as non-pharmacological measures such as emotional support, rest, and repositioning.  Will continue to monitor.   The patient is Stable - Low risk of patient condition declining or worsening    Shift Goals  Clinical Goals: Safety  Patient Goals: Sleep well    Progress made toward(s) clinical / shift goals:  pt participates with therapy and is cooperative with nursing    Patient is not progressing towards the following goals:

## 2024-03-05 NOTE — THERAPY
Occupational Therapy  Daily Treatment     Patient Name: Mariana Jett  Age:  89 y.o., Sex:  female  Medical Record #: [id number]  Today's Date: 3/5/2024     Precautions  Precautions: Fall Risk, Spinal / Back Precautions          Subjective  Pt supine in bed upon arrival, agreeable to OT session.      Objective     03/05/24 0931   OT Charge Group   OT Self Care / ADL (Units) 4   OT Total Time Spent   OT Individual Total Time Spent (Mins) 60   Vitals   O2 Delivery Device None - Room Air   Functional Level of Assist   Grooming Standing;Supervision  (to brush teeth and comb hair)   Grooming Description Standing at sink;Supervision for safety   Bathing Contact Guard Assist  (CGA for standing; sup for seated)   Bathing Description Grab bar;Increased time;Supervision for safety;Verbal cueing;Set-up of equipment;Initial preparation for task;Hand held shower;Long handled bath tool   Upper Body Dressing Stand by Assist  (set-up to don/doff t shirt)   Upper Body Dressing Description Set-up of equipment   Lower Body Dressing Minimal Assist  (refused AE; required total A for sock mgmt but SBA for pants/brief with verbal cues for spinal precautions)   Lower Body Dressing Description Grab bar;Increased time;Verbal cueing;Cues for spinal precautions   Bed, Chair, Wheelchair Transfer Standby Assist  (FWW <> EOB)   Bed Chair Wheelchair Transfer Description Adaptive equipment;Increased time;Set-up of equipment   Tub / Shower Transfers Standby Assist  (w/c <> shower bench via GB)   Tub Shower Transfer Description Increased time;Verbal cueing;Supervision for safety;Set-up of equipment;Initial preparation for task;Shower bench;Grab bar   Bed Mobility    Supine to Sit Standby Assist   Sit to Supine Standby Assist   Interdisciplinary Plan of Care Collaboration   Patient Position at End of Therapy In Bed;Bed Alarm On;Call Light within Reach;Tray Table within Reach;Phone within Reach     After shower, pt completed functional mobility 250 ft with  FWW, SBA, and w/c follow. She benefited from using floor tiles as external cues for step through gait pattern with widened LISETH during ambulation.     Min verbal cues for spinal precautions during dressing     Assessment  Pt tolerated session well. She refused use of AE to support independence with LB dressing and will thus require ongoing total assist with managing socks due to spinal precautions. She can thread BLE into brief/elastic waist bottoms by lifting BLE to trunk.     Strengths: Able to follow instructions, Effective communication skills, Good insight into deficits/needs, Independent prior level of function, Motivated for self care and independence, Pleasant and cooperative, Willingly participates in therapeutic activities  Barriers: Generalized weakness, Impaired balance, Impaired activity tolerance, Pain, Decreased endurance, Bladder incontinence    Plan  ADLs  Standing tolerance/balance  Strengthening, endurance       DME       Passport items to be completed:  Perform bathroom transfers, complete dressing, complete feeding, get ready for the day, prepare a simple meal, participate in household tasks, adapt home for safety needs, demonstrate home exercise program, complete caregiver training     Occupational Therapy Goals (Active)       Problem: Dressing       Dates: Start:  03/02/24         Goal: STG-Within one week, patient will dress LB       Dates: Start:  03/02/24       Description: At a Min A level with AE/AD PRN and ongoing adherence to spinal precautions.            Problem: IADL's       Dates: Start:  03/02/24         Goal: STG-Within one week, patient will access kitchen area       Dates: Start:  03/02/24       Description: At a SUP level with LRD and seated RB as needed.             Problem: OT Long Term Goals       Dates: Start:  03/02/24         Goal: LTG-By discharge, patient will complete basic self care tasks       Dates: Start:  03/02/24       Description: At a Min A to Mod I level with  AE/AD/DME PRN.          Goal: LTG-By discharge, patient will perform bathroom transfers       Dates: Start:  03/02/24       Description: At a SUP to Mod I level with AE/AD/DME PRN.             Problem: Toileting       Dates: Start:  03/02/24         Goal: STG-Within one week, patient will complete toileting tasks       Dates: Start:  03/02/24       Description: a Min A level with AE/AD PRN and ongoing adherence to spinal precautions.

## 2024-03-05 NOTE — THERAPY
Physical Therapy   Daily Treatment     Patient Name: Mariana Jett  Age:  89 y.o., Sex:  female  Medical Record #: [id number]  Today's Date: 3/5/2024     Precautions  Precautions: Fall Risk, Spinal / Back Precautions     Subjective    Pt up in wc, willing to participate     Objective       03/05/24 0831   PT Charge Group   PT Gait Training (Units) 1   PT Therapeutic Exercise (Units) 1   PT Total Time Spent   PT Individual Total Time Spent (Mins) 30   Gait Functional Level of Assist    Gait Level Of Assist Standby Assist   Assistive Device Front Wheel Walker   Distance (Feet) 100   # of Times Distance was Traveled 2   Deviation Decreased Base Of Support;Bradykinetic   Transfer Functional Level of Assist   Bed, Chair, Wheelchair Transfer Standby Assist   Bed Chair Wheelchair Transfer Description Adaptive equipment;Increased time;Set-up of equipment;Verbal cueing   Sitting Lower Body Exercises   Ankle Pumps 2 sets of 10   Hip Flexion 2 sets of 10   Hip Abduction 2 sets of 10   Hip Adduction 2 sets of 10   Long Arc Quad 2 sets of 10   Hamstring Curl 2 sets of 10   Comments 1.5# wts/ light resist TB   Bed Mobility    Supine to Sit Standby Assist   Sit to Supine Standby Assist   Sit to Stand Standby Assist         Assessment    Pt tolerated increased gait distance without difficulty, remains with narrow LISETH but aware of safety precautions.     Strengths: Able to follow instructions, Alert and oriented, Manages pain appropriately, Motivated for self care and independence, Pleasant and cooperative, Supportive family, Willingly participates in therapeutic activities  Barriers: Decreased endurance, Fatigue, Impaired activity tolerance, Impaired balance, Pain, Generalized weakness    Plan    Gait training with FWW, balance training, stair training, overall activity tolerance/endurance/ strength training.     DME       Passport items to be completed:  Get in/out of bed safely, in/out of a vehicle, safely use mobility device, walk or  wheel around home/community, navigate up and down stairs, show how to get up/down from the ground, ensure home is accessible, demonstrate HEP, complete caregiver training    Physical Therapy Problems (Active)       Problem: Balance       Dates: Start:  03/02/24         Goal: STG-Within one week, patient will maintain static standing balance with narrow LISETH and no UE support, for at least 30 seconds.        Dates: Start:  03/02/24               Problem: Mobility       Dates: Start:  03/02/24         Goal: STG-Within one week, patient will ambulate at least 100 feet with LRAD and CGA.        Dates: Start:  03/02/24            Goal: STG-Within one week, patient will ambulate up/down a curb with LRAD and min A.        Dates: Start:  03/02/24            Goal: STG-Within one week, patient will ascend and descend four to six stairs with rails and CGA.        Dates: Start:  03/02/24               Problem: PT-Long Term Goals       Dates: Start:  03/02/24         Goal: LTG-By discharge, patient will ambulate at least 150 feet with LRAD and supervision.        Dates: Start:  03/02/24            Goal: LTG-By discharge, patient will transfer one surface to another with LRAD and supervision.        Dates: Start:  03/02/24            Goal: LTG-By discharge, patient will ambulate up/down 4-6 stairs with B rails and SBA.        Dates: Start:  03/02/24            Goal: LTG-By discharge, patient will transfer in/out of a car with LRAD and SBA.        Dates: Start:  03/02/24

## 2024-03-05 NOTE — PROGRESS NOTES
NURSING DAILY NOTE    Name: Mariana Jett   Date of Admission: 3/1/2024   Admitting Diagnosis: Closed compression fracture of L2 lumbar vertebra, initial encounter ([facility])  Attending Physician: Wendy Pride D.o.  Allergies: Patient has no known allergies.    Safety  Patient Assist     Patient Precautions  Fall Risk, Spinal / Back Precautions   Precaution Comments     Bed Transfer Status  Standby Assist (EOB <> w/c via FWW)  Toilet Transfer Status   Supervised  Assistive Devices  Walker - front wheel  Oxygen  None - Room Air  Diet/Therapeutic Dining  Current Diet Order   Procedures    Diet Order Diet: Consistent CHO (Diabetic)     Pill Administration  whole  Agitated Behavioral Scale     ABS Level of Severity       Fall Risk  Has the patient had a fall this admission?   No  Hanh Brennan Fall Risk Scoring  19, HIGH RISK  Fall Risk Safety Measures  Bed strip alarm    Vitals  Temperature: 36.6 °C (97.9 °F)  Temp src: Oral  Pulse: 76  Respiration: 18  Blood Pressure : 130/64  Blood Pressure MAP (Calculated): 86 MM HG  BP Location: Right, Upper Arm  Patient BP Position: Supine     Oxygen  Pulse Oximetry: 93 %  O2 (LPM): 0  O2 Delivery Device: None - Room Air    Bowel and Bladder  Last Bowel Movement  03/03/24  Stool Type  Type 4: Like a sausage or snake, smooth and soft  Bowel Device     Continent  Bladder: Did not void   Bowel: No movement  Bladder Function  Urine Void (mL):  (large)  Number of Times Voided: 1  Urinary Options: Yes  Urine Color: Unable To Evaluate  Genitourinary Assessment   Bladder Assessment (WDL):  Within Defined Limits  Hartman Catheter: Not Applicable  Urine Color: Unable To Evaluate  Bladder Device: Bathroom  Time Void: Yes  Bladder Scan: Post Void  $ Bladder Scan Results (mL): 1    Skin  Michael Score   18  Sensory Interventions   Bed Types: Standard/Trauma Mattress with Overlay  Skin Preventative Measures: Pillows in Use for Support /  Positioning, Waffle Overlay  Moisture Interventions         Pain  Pain Rating Scale  7 - Focus of attention, prevents doing daily activities  Pain Location  Back  Pain Location Orientation  Lower  Pain Interventions   Rest    ADLs    Bathing   Shower, Staff (Shower with Ot )  Linen Change      Personal Hygiene  Moist Priyanka Wipes  Chlorhexidine Bath      Oral Care  Brushed Teeth  Teeth/Dentures     Shave     Nutrition Percentage Eaten  Dinner, Between 25-50% Consumed  Environmental Precautions  Treaded Slipper Socks on Patient, Bed in Low Position  Patient Turns/Positioning  Patient Turns Self from Side to Side  Patient Turns Assistance/Tolerance     Bed Positions  Bed Controls On, Bed Locked  Head of Bed Elevated  Self regulated      Psychosocial/Neurologic Assessment  Psychosocial Assessment  Psychosocial (WDL):  Within Defined Limits  Neurologic Assessment  Neuro (WDL): Exceptions to WDL  Level of Consciousness: Alert  Orientation Level: Oriented to place, Oriented to situation, Oriented to person  Cognition: Appropriate judgement, Appropriate attention/concentration  Speech: Clear  Muscle Strength Right Arm: Good Strength Against Gravity and Moderate Resistance  Muscle Strength Left Arm: Good Strength Against Gravity and Moderate Resistance  Muscle Strength Right Leg: Good Strength Against Gravity and Moderate Resistance  Muscle Strength Left Leg: Good Strength Against Gravity and Moderate Resistance  EENT (WDL):  WDL Except    Cardio/Pulmonary Assessment  Edema      Respiratory Breath Sounds  RUL Breath Sounds: Clear  RML Breath Sounds: Clear  RLL Breath Sounds: Clear, Diminished  IRAJ Breath Sounds: Clear  LLL Breath Sounds: Clear, Diminished  Cardiac Assessment   Cardiac (WDL):  WDL Except (H/O HTN.)    '

## 2024-03-05 NOTE — CARE PLAN
"  Problem: Knowledge Deficit - Standard  Goal: Patient and family/care givers will demonstrate understanding of plan of care, disease process/condition, diagnostic tests and medications  Outcome: Progressing  Note: Pt agrees with plan of care tonight regarding medications and safety.  Will continue to monitor patient.     Problem: Fall Risk - Rehab  Goal: Patient will remain free from falls  Outcome: Progressing  Note: Hanh Brennan Fall risk Assessment Score: 19    High fall risk Interventions   - Alarming seatbelt  - Wander guard  - Bed and strip alarm   - Yellow sign by the door   - Yellow wrist band \"Fall risk\"  - Room near to the nurse station  - Do not leave patient unattended in the bathroom  - Fall risk education provided         The patient is Stable - Low risk of patient condition declining or worsening    Shift Goals  Clinical Goals: Safety  Patient Goals: Sleep well    Progress made toward(s) clinical / shift goals:  progressing          "

## 2024-03-05 NOTE — CARE PLAN
Problem: Dressing  Goal: STG-Within one week, patient will dress LB  Description: At a Min A level with AE/AD PRN and ongoing adherence to spinal precautions.  Outcome: Not Met     Problem: Toileting  Goal: STG-Within one week, patient will complete toileting tasks  Description: a Min A level with AE/AD PRN and ongoing adherence to spinal precautions.    Outcome: Not Met     Problem: IADL's  Goal: STG-Within one week, patient will access kitchen area  Description: At a SUP level with LRD and seated RB as needed.   Outcome: Not Met

## 2024-03-05 NOTE — THERAPY
Occupational Therapy  Daily Treatment     Patient Name: Mariana Jett  Age:  89 y.o., Sex:  female  Medical Record #: [id number]  Today's Date: 3/5/2024     Precautions  Precautions: Fall Risk, Spinal / Back Precautions          Subjective    Pt supine in bed upon arrival, pleasant and cooperative, agreeable to therapy       Objective       03/05/24 1331   OT Charge Group   OT Self Care / ADL (Units) 1   OT Therapeutic Exercise (Units) 1   OT Total Time Spent   OT Individual Total Time Spent (Mins) 30   Functional Level of Assist   Grooming Standby Assist;Standing  (with FWW)   Toileting Standby Assist   Toilet Transfers Standby Assist  (with FWW)   Bed Mobility    Supine to Sit Standby Assist   Sit to Supine Standby Assist   Interdisciplinary Plan of Care Collaboration   Patient Position at End of Therapy Call Light within Reach;Tray Table within Reach;In Bed;Bed Alarm On     Functional mobility at FWW level: CGA room<>bathroom, indoors ~200ft SBA with 1 seated RB    Assessment    Pt cont to progress with functional mobility at FWW, has narrow LISETH, but once brought to her attention she demo improvement with gait, had no LOB    Strengths: Able to follow instructions, Effective communication skills, Good insight into deficits/needs, Independent prior level of function, Motivated for self care and independence, Pleasant and cooperative, Willingly participates in therapeutic activities  Barriers: Generalized weakness, Impaired balance, Impaired activity tolerance, Pain, Decreased endurance, Bladder incontinence    Plan    Refer to Primary OT POC/goals     Occupational Therapy Goals (Active)       Problem: Dressing       Dates: Start:  03/02/24         Goal: STG-Within one week, patient will dress LB       Dates: Start:  03/02/24       Description: At a Min A level with AE/AD PRN and ongoing adherence to spinal precautions.            Problem: IADL's       Dates: Start:  03/02/24         Goal: STG-Within one week, patient will  access kitchen area       Dates: Start:  03/02/24       Description: At a SUP level with LRD and seated RB as needed.             Problem: OT Long Term Goals       Dates: Start:  03/02/24         Goal: LTG-By discharge, patient will complete basic self care tasks       Dates: Start:  03/02/24       Description: At a Min A to Mod I level with AE/AD/DME PRN.          Goal: LTG-By discharge, patient will perform bathroom transfers       Dates: Start:  03/02/24       Description: At a SUP to Mod I level with AE/AD/DME PRN.             Problem: Toileting       Dates: Start:  03/02/24         Goal: STG-Within one week, patient will complete toileting tasks       Dates: Start:  03/02/24       Description: a Min A level with AE/AD PRN and ongoing adherence to spinal precautions.

## 2024-03-05 NOTE — PROGRESS NOTES
Hospital Medicine Daily Progress Note        Chief Complaint  Hypertension  Diabetes    Interval Problem Update  No complaints.  Doing ok.    Review of Systems  Review of Systems   Constitutional:  Negative for chills and fever.   Respiratory:  Negative for shortness of breath.    Cardiovascular:  Negative for chest pain.   Gastrointestinal:  Negative for abdominal pain, diarrhea, nausea and vomiting.   Psychiatric/Behavioral:  The patient is not nervous/anxious.         Physical Exam  Temp:  [36.4 °C (97.5 °F)-36.6 °C (97.9 °F)] 36.6 °C (97.9 °F)  Pulse:  [64-82] 76  Resp:  [18] 18  BP: (111-130)/(55-65) 130/64  SpO2:  [93 %-98 %] 93 %    Physical Exam  Vitals and nursing note reviewed.   Constitutional:       Appearance: Normal appearance.   HENT:      Head: Atraumatic.   Eyes:      Conjunctiva/sclera: Conjunctivae normal.      Pupils: Pupils are equal, round, and reactive to light.   Cardiovascular:      Rate and Rhythm: Normal rate and regular rhythm.      Heart sounds: No murmur heard.  Pulmonary:      Effort: Pulmonary effort is normal.      Breath sounds: No stridor. No wheezing or rales.   Abdominal:      General: There is no distension.      Palpations: Abdomen is soft.      Tenderness: There is no abdominal tenderness.   Musculoskeletal:      Cervical back: Normal range of motion and neck supple.      Right lower leg: No edema.      Left lower leg: No edema.   Skin:     General: Skin is warm and dry.      Findings: No rash.   Neurological:      Mental Status: She is alert and oriented to person, place, and time.   Psychiatric:         Mood and Affect: Mood normal.         Behavior: Behavior normal.         Fluids    Intake/Output Summary (Last 24 hours) at 3/5/2024 0956  Last data filed at 3/5/2024 0516  Gross per 24 hour   Intake 480 ml   Output --   Net 480 ml       Laboratory  Recent Labs     03/04/24  0557   WBC 7.3   RBC 3.84*   HEMOGLOBIN 11.9*   HEMATOCRIT 35.8*   MCV 93.2   MCH 31.0   MCHC 33.2    RDW 43.6   PLATELETCT 380   MPV 8.1*     Recent Labs     03/04/24  0557   SODIUM 132*   POTASSIUM 4.3   CHLORIDE 97   CO2 23   GLUCOSE 148*   BUN 11   CREATININE 0.39*   CALCIUM 8.6                   Assessment/Plan  Lumbar compression fracture, closed, initial encounter ([facility])- (present on admission)  Assessment & Plan  2nd to GLF  S/P L2 kyphoplasty (2/29)    Dyslipidemia- (present on admission)  Assessment & Plan  Cont Crestor    Diabetes mellitus type II, controlled ([facility])- (present on admission)  Assessment & Plan  Hba1c: 6.4  Cont Metformin  Off accuchecks  Note: home meds include Metformin 500 mg daily usually, but pt reports she takes twice daily as needed if FSBS >150    Hypertension- (present on admission)  Assessment & Plan  BP better and ok after Norvasc dose change  Cont Norvasc  Cont Avapro  Cont to monitor

## 2024-03-06 ENCOUNTER — APPOINTMENT (OUTPATIENT)
Dept: OCCUPATIONAL THERAPY | Facility: REHABILITATION | Age: 89
DRG: 560 | End: 2024-03-06
Attending: PHYSICAL MEDICINE & REHABILITATION
Payer: MEDICARE

## 2024-03-06 ENCOUNTER — APPOINTMENT (OUTPATIENT)
Dept: PHYSICAL THERAPY | Facility: REHABILITATION | Age: 89
DRG: 560 | End: 2024-03-06
Attending: PHYSICAL MEDICINE & REHABILITATION
Payer: MEDICARE

## 2024-03-06 PROCEDURE — 97530 THERAPEUTIC ACTIVITIES: CPT

## 2024-03-06 PROCEDURE — 700102 HCHG RX REV CODE 250 W/ 637 OVERRIDE(OP): Performed by: HOSPITALIST

## 2024-03-06 PROCEDURE — 700102 HCHG RX REV CODE 250 W/ 637 OVERRIDE(OP): Performed by: PHYSICAL MEDICINE & REHABILITATION

## 2024-03-06 PROCEDURE — 99231 SBSQ HOSP IP/OBS SF/LOW 25: CPT | Performed by: HOSPITALIST

## 2024-03-06 PROCEDURE — 700111 HCHG RX REV CODE 636 W/ 250 OVERRIDE (IP): Performed by: PHYSICAL MEDICINE & REHABILITATION

## 2024-03-06 PROCEDURE — 97110 THERAPEUTIC EXERCISES: CPT

## 2024-03-06 PROCEDURE — 97112 NEUROMUSCULAR REEDUCATION: CPT

## 2024-03-06 PROCEDURE — 97116 GAIT TRAINING THERAPY: CPT

## 2024-03-06 PROCEDURE — 97535 SELF CARE MNGMENT TRAINING: CPT

## 2024-03-06 PROCEDURE — 99233 SBSQ HOSP IP/OBS HIGH 50: CPT | Performed by: PHYSICAL MEDICINE & REHABILITATION

## 2024-03-06 PROCEDURE — 770010 HCHG ROOM/CARE - REHAB SEMI PRIVAT*

## 2024-03-06 PROCEDURE — A9270 NON-COVERED ITEM OR SERVICE: HCPCS | Performed by: PHYSICAL MEDICINE & REHABILITATION

## 2024-03-06 PROCEDURE — A9270 NON-COVERED ITEM OR SERVICE: HCPCS | Performed by: HOSPITALIST

## 2024-03-06 PROCEDURE — 700101 HCHG RX REV CODE 250: Performed by: PHYSICAL MEDICINE & REHABILITATION

## 2024-03-06 RX ORDER — AMOXICILLIN 250 MG
2 CAPSULE ORAL EVERY EVENING
Status: DISCONTINUED | OUTPATIENT
Start: 2024-03-06 | End: 2024-03-10 | Stop reason: HOSPADM

## 2024-03-06 RX ADMIN — METHOCARBAMOL 500 MG: 500 TABLET ORAL at 14:43

## 2024-03-06 RX ADMIN — DOCUSATE SODIUM 50MG AND SENNOSIDES 8.6MG 2 TABLET: 8.6; 5 TABLET, FILM COATED ORAL at 20:48

## 2024-03-06 RX ADMIN — ENOXAPARIN SODIUM 30 MG: 100 INJECTION SUBCUTANEOUS at 17:41

## 2024-03-06 RX ADMIN — METHOCARBAMOL 500 MG: 500 TABLET ORAL at 08:06

## 2024-03-06 RX ADMIN — OMEPRAZOLE 20 MG: 20 CAPSULE, DELAYED RELEASE ORAL at 08:06

## 2024-03-06 RX ADMIN — GABAPENTIN 100 MG: 100 CAPSULE ORAL at 14:43

## 2024-03-06 RX ADMIN — GABAPENTIN 100 MG: 100 CAPSULE ORAL at 20:52

## 2024-03-06 RX ADMIN — ROSUVASTATIN CALCIUM 10 MG: 10 TABLET, COATED ORAL at 08:06

## 2024-03-06 RX ADMIN — LIDOCAINE 1 PATCH: 4 PATCH TOPICAL at 05:12

## 2024-03-06 RX ADMIN — GABAPENTIN 100 MG: 100 CAPSULE ORAL at 08:06

## 2024-03-06 RX ADMIN — METHOCARBAMOL 500 MG: 500 TABLET ORAL at 17:41

## 2024-03-06 RX ADMIN — IRBESARTAN 300 MG: 150 TABLET ORAL at 05:12

## 2024-03-06 RX ADMIN — POLYETHYLENE GLYCOL 3350 1 PACKET: 17 POWDER, FOR SOLUTION ORAL at 05:12

## 2024-03-06 RX ADMIN — METHOCARBAMOL 500 MG: 500 TABLET ORAL at 20:48

## 2024-03-06 RX ADMIN — Medication 1000 UNITS: at 08:06

## 2024-03-06 RX ADMIN — METFORMIN HYDROCHLORIDE 500 MG: 500 TABLET ORAL at 08:06

## 2024-03-06 RX ADMIN — AMLODIPINE BESYLATE 10 MG: 5 TABLET ORAL at 05:12

## 2024-03-06 ASSESSMENT — ENCOUNTER SYMPTOMS
FEVER: 0
PALPITATIONS: 0
BLURRED VISION: 0
DIZZINESS: 0
VOMITING: 0
HEADACHES: 0
NAUSEA: 0
HALLUCINATIONS: 0
SHORTNESS OF BREATH: 0

## 2024-03-06 ASSESSMENT — GAIT ASSESSMENTS
DISTANCE (FEET): 200
DEVIATION: DECREASED BASE OF SUPPORT;BRADYKINETIC
ASSISTIVE DEVICE: FRONT WHEEL WALKER
GAIT LEVEL OF ASSIST: STANDBY ASSIST

## 2024-03-06 ASSESSMENT — ACTIVITIES OF DAILY LIVING (ADL): BED_CHAIR_WHEELCHAIR_TRANSFER_DESCRIPTION: SUPERVISION FOR SAFETY;ADAPTIVE EQUIPMENT;INCREASED TIME

## 2024-03-06 NOTE — PROGRESS NOTES
Physical Medicine & Rehabilitation Progress Note    Encounter Date: 3/6/2024    Chief Complaint: Back pain controlled    Interval Events (Subjective):  Vital signs: Blood pressure largely stable with occasional elevation of systolic into the 140s  Voiding volitionally  Bowel movement 3/5    Patient seen and examined in her room.  States that therapy is going well.  She does not have any specific complaints at this time.    ROS: 14 point ROS negative unless otherwise specified in the HPI    Objective:  VITAL SIGNS: BP (!) 149/69   Pulse 80   Temp 36.5 °C (97.7 °F) (Temporal)   Resp 18   Wt 47.9 kg (105 lb 9.6 oz)   SpO2 92%   BMI 17.18 kg/m²     GEN: No apparent distress  HEENT: Head normocephalic, atraumatic.  Sclera nonicteric bilaterally, no ocular discharge appreciated bilaterally.  CV: Extremities warm and well-perfused, no peripheral edema appreciated bilaterally.  PULMONARY: Breathing nonlabored on room air, no respiratory accessory muscle use.  Not requiring supplemental oxygen.  ABD: Soft, nontender.  SKIN: No appreciable skin breakdown on exposed areas of skin.  PSYCH: Mood and affect within normal limits.  NEURO: Awake alert.  Conversational.  Logical thought content.      Laboratory Values:  Recent Results (from the past 72 hour(s))   CBC WITHOUT DIFFERENTIAL    Collection Time: 03/04/24  5:57 AM   Result Value Ref Range    WBC 7.3 4.8 - 10.8 K/uL    RBC 3.84 (L) 4.20 - 5.40 M/uL    Hemoglobin 11.9 (L) 12.0 - 16.0 g/dL    Hematocrit 35.8 (L) 37.0 - 47.0 %    MCV 93.2 81.4 - 97.8 fL    MCH 31.0 27.0 - 33.0 pg    MCHC 33.2 32.2 - 35.5 g/dL    RDW 43.6 35.9 - 50.0 fL    Platelet Count 380 164 - 446 K/uL    MPV 8.1 (L) 9.0 - 12.9 fL   Basic Metabolic Panel    Collection Time: 03/04/24  5:57 AM   Result Value Ref Range    Sodium 132 (L) 135 - 145 mmol/L    Potassium 4.3 3.6 - 5.5 mmol/L    Chloride 97 96 - 112 mmol/L    Co2 23 20 - 33 mmol/L    Glucose 148 (H) 65 - 99 mg/dL    Bun 11 8 - 22 mg/dL     Creatinine 0.39 (L) 0.50 - 1.40 mg/dL    Calcium 8.6 8.5 - 10.5 mg/dL    Anion Gap 12.0 7.0 - 16.0   ESTIMATED GFR    Collection Time: 03/04/24  5:57 AM   Result Value Ref Range    GFR (CKD-EPI) 95 >60 mL/min/1.73 m 2       Medications:  Scheduled Medications   Medication Dose Frequency    amLODIPine  10 mg DAILY    Pharmacy Consult Request  1 Each PHARMACY TO DOSE    omeprazole  20 mg DAILY    enoxaparin (LOVENOX) injection  30 mg DAILY AT 1800    gabapentin  100 mg TID    irbesartan  300 mg DAILY    lidocaine  1 Patch Q24HR    metFORMIN  500 mg DAILY    methocarbamol  500 mg 4X/DAY    rosuvastatin  10 mg DAILY    vitamin D3  1,000 Units DAILY     PRN medications: Respiratory Therapy Consult, hydrALAZINE, carboxymethylcellulose, benzocaine-menthol, mag hydrox-al hydrox-simeth, ondansetron **OR** ondansetron, traZODone, sodium chloride, acetaminophen, oxyCODONE immediate-release **OR** oxyCODONE immediate-release, polyethylene glycol/lytes    Diet:  Current Diet Order   Procedures    Diet Order Diet: Consistent CHO (Diabetic)       Medical Decision Making and Plan:  L2 compression fracture secondary to fall at [facility] 2/27/2024  S/p L2 Cementoplasty 2/29/2024 Dr. Sawyer  PT and OT for mobility and ADLs. Per guidelines, 15 hours per week between PT, OT and/or SLP.  Follow-up with PCP  Restrictions: No strenuous activity, heavy lifting, twisting for 1-2 weeks.  Do not lift anything greater than 7 pounds for 1-2 weeks.     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 Chew  Follow-up orthospine as an outpatient     Hyponatremia  Fluctuates greater than 130 3/4      Leukocytosis  Being treated for UTI  3/4 resolved     Anemia  3/4 fluctuant from 10.6 up to 13.1 down to 11.9 today  Monitor     Thrombocytosis  Resolved prior to admission  Resolved 3/4     Hypertension  Continue amlodipine 5 mg daily --> 10 mg daily  3/4  Continue Avapro 300 mg daily  3/6 blood pressure elevated, amlodipine increased per hospitalist.     Hyperlipidemia  Continue rosuvastatin 10 mg nightly     Type 2 diabetes mellitus with hyperglycemia  Sliding scale insulin discontinued.  Continue metformin 500 mg daily     Pain  As needed Tylenol  As needed oxycodone  As needed ice  Scheduled Robaxin 500 mg 4 times daily  Scheduled lidocaine patch every 24 hours  Scheduled gabapentin 100 mg 3 times daily     Skin  Patient at risk for skin breakdown due to debility in areas including sacrum, achilles, elbows and head in addition to other sites. Nursing to assess skin daily.      GI Ppx  Patient on Prilosec for GERD prophylaxis.      Bowel   Continue MiraLAX 1 packet twice daily PRN     Bladder  Pseudomonas UTI (onset prior to admission)  TV/PVR/BS PRN  3/4 Ciprofloxacin 500 mg twice daily for 5-day course through 3/5.         Upcoming Labs/imaging: 3/7     DVT PROPHYLAXIS: Lovenox 30 mg subcutaneously nightly (adjusted for weight)     HOSPITALIST FOLLOWING: Discussed with hospitalist 3/6    CODE STATUS: DNR/DNI     DISPO: Home with spouse     JAKE: 3/10/24     MEDS SENT TO: TBD     DISCHARGE SPECIALIST FOLLOW UP: Orthospine     Patient to scheduled follow up with their PCP within 2 weeks from discharge from the [facility].      ____________________________________    Dr. Wendy Pride DO, MS  [facility] - [facility] Medicine [facility]   ____________________________________    _____________________________________  Interdisciplinary Team Conference   Most recent IDT on 3/6/2024    I, Dr. Wendy Pride DO, MS, was present and led the interdisciplinary team conference on 3/6/2024.  I led the IDT conference and agree with the IDT conference documentation and plan of care as noted below.     Nursing:  Diet Current Diet Order   Procedures    Diet Order Diet: Consistent CHO (Diabetic)       Eating ADL Supervision  Increased time, Set-up of  equipment or meal/tube feeding, Supervision for safety   % of Last Meal  Oral Nutrition: Breakfast, Between % Consumed   Sleep    Bowel Last BM: 03/03/24   Bladder        Physical Therapy:  Bed Mobility    Transfers Supervised (FWW > EOB)  Adaptive equipment, Increased time, Initial preparation for task, Set-up of equipment, Supervision for safety, Non-hospital bed (standing bed used in therapy gym with use of bed rail)   Mobility Standby Assist   Stairs No stairs       Occupational Therapy:  Grooming Standby Assist, Standing (with FWW)   Bathing Contact Guard Assist (CGA for standing; sup for seated)   UB Dressing Stand by Assist (set-up to don/doff t shirt)   LB Dressing Minimal Assist (refused AE; required total A for sock mgmt but SBA for pants/brief with verbal cues for spinal precautions)   Toileting Standby Assist   Shower & Transfer        Respiratory Therapy:  O2 (LPM): 0  O2 Delivery Device: None - Room Air    Case Management:  Continues to work on disposition and DME needs.     BARRIERS TO DISCHARGE HOME:       Discharge Date/Disposition:  3/10/24  _____________________________________    Total time:  53 minutes. Time spent included pre-rounding review of vitals and tests, unit/floor time, face-to-face time with the patient including physical examination, care coordination, counseling of patient and/or family, ordering medications/procedures/tests, discussion with CM, PT, OT, SLP and/or other healthcare providers, and documentation in the electronic medical record.

## 2024-03-06 NOTE — CARE PLAN
"  Problem: Knowledge Deficit - Standard  Goal: Patient and family/care givers will demonstrate understanding of plan of care, disease process/condition, diagnostic tests and medications  Outcome: Progressing  Note: Pt agrees with plan of care tonight regarding medications and safety.  Will continue to monitor patient.     Problem: Fall Risk - Rehab  Goal: Patient will remain free from falls  Outcome: Progressing  Note: Hanh Brennan Fall risk Assessment Score: 17    High fall risk Interventions   - Alarming seatbelt  - Wander guard  - Bed and strip alarm   - Yellow sign by the door   - Yellow wrist band \"Fall risk\"  - Room near to the nurse station  - Do not leave patient unattended in the bathroom  - Fall risk education provided         The patient is Stable - Low risk of patient condition declining or worsening    Shift Goals  Clinical Goals: Safety  Patient Goals: Sleep well    Progress made toward(s) clinical / shift goals:  progressing          "

## 2024-03-06 NOTE — CARE PLAN
Problem: Mobility  Goal: STG-Within one week, patient will ascend and descend four to six stairs with rails and CGA.   Outcome: Progressing     Problem: Balance  Goal: STG-Within one week, patient will maintain static standing balance with narrow LISETH and no UE support, for at least 30 seconds.   Outcome: Met     Problem: Mobility  Goal: STG-Within one week, patient will ambulate at least 100 feet with LRAD and CGA.   Outcome: Met  Goal: STG-Within one week, patient will ambulate up/down a curb with LRAD and min A.   Outcome: Met

## 2024-03-06 NOTE — CARE PLAN
The patient is Stable - Low risk of patient condition declining or worsening    Shift Goals  Clinical Goals: safety  Patient Goals: safety    Problem: Fall Risk - Rehab  Goal: Patient will remain free from falls  Outcome: Progressing Pt uses call light consistently and appropriately. Waits for assistance does not attempt self transfer this shift. Able to verbalize needs.     Problem: Mobility  Goal: Patient's capacity to carry out activities will improve  Outcome: Progressing patient has been up participating in therapies, educated to take rest periods in between to avoid fatigue.

## 2024-03-06 NOTE — THERAPY
Physical Therapy   Daily Treatment     Patient Name: Mariana Jett  Age:  89 y.o., Sex:  female  Medical Record #: [id number]  Today's Date: 3/6/2024     Precautions  Precautions: Fall Risk, Spinal / Back Precautions     Subjective    Pt resting in bed, willing to participate.     Objective       03/06/24 0931   PT Charge Group   PT Gait Training (Units) 1   PT Therapeutic Exercise (Units) 2   PT Neuromuscular Re-Education / Balance (Units) 1   PT Total Time Spent   PT Individual Total Time Spent (Mins) 60   Gait Functional Level of Assist    Gait Level Of Assist Standby Assist   Assistive Device Front Wheel Walker   Distance (Feet) 200  (in addition to 150 ft x 1)   # of Times Distance was Traveled 1   Deviation Decreased Base Of Support;Bradykinetic   Transfer Functional Level of Assist   Bed, Chair, Wheelchair Transfer Standby Assist   Toilet Transfers Standby Assist   Sitting Lower Body Exercises   Ankle Pumps 2 sets of 10   Hip Flexion 2 sets of 10   Hip Abduction 2 sets of 10   Hip Adduction 2 sets of 10   Long Arc Quad 2 sets of 10   Hamstring Curl 2 sets of 10   Nustep Resistance Level 5;Resistance Level 2  (5 minutes at level 5/ 5 minutes at level 2 for strength and cardio/endurance training)   Comments 1.5# ankle wts/ green TB   Standing Lower Body Exercises   Standing Lower Body Exercises Yes   Hip Extension 2 sets of 10   Hip Abduction 2 sets of 10   Marching 2 sets of 10   Heel Rise 2 sets of 10   Toe Rise 2 sets of 10   Bed Mobility    Supine to Sit Supervised   Sit to Supine Supervised   Sit to Stand Supervised   Scooting Modified Independent   Rolling Modified Independent   Neuro-Muscular Treatments   Neuro-Muscular Treatments Anterior weight shift;Postural Changes;Postural Facilitation   Comments forward/ retro-ambulation with UE support at // bars for 8 steps forward/ 8 steps backward x 3 trials to increase overall stride length and glut activation/ stance stabilization   Physical Therapist Assigned    Assigned PT / Treatment Time / Comments brian 60+30         Assessment    Pt tolerated endurance and strength training well, improved symmetry with forward/backward walking today, Pt fatigue at end of session with reports of increased hip soreness/ fatigue.     Strengths: Able to follow instructions, Alert and oriented, Manages pain appropriately, Motivated for self care and independence, Pleasant and cooperative, Supportive family, Willingly participates in therapeutic activities  Barriers: Decreased endurance, Fatigue, Impaired activity tolerance, Impaired balance, Pain, Generalized weakness    Plan    Gait training with FWW, balance training, stair training, overall activity tolerance/endurance/ strength training.       DME       Passport items to be completed:  Get in/out of bed safely, in/out of a vehicle, safely use mobility device, walk or wheel around home/community, navigate up and down stairs, show how to get up/down from the ground, ensure home is accessible, demonstrate HEP, complete caregiver training    Physical Therapy Problems (Active)       Problem: Mobility       Dates: Start:  03/02/24         Goal: STG-Within one week, patient will ascend and descend four to six stairs with rails and CGA.        Dates: Start:  03/02/24               Problem: PT-Long Term Goals       Dates: Start:  03/02/24         Goal: LTG-By discharge, patient will ambulate at least 150 feet with LRAD and supervision.        Dates: Start:  03/02/24            Goal: LTG-By discharge, patient will transfer one surface to another with LRAD and supervision.        Dates: Start:  03/02/24            Goal: LTG-By discharge, patient will ambulate up/down 4-6 stairs with B rails and SBA.        Dates: Start:  03/02/24            Goal: LTG-By discharge, patient will transfer in/out of a car with LRAD and SBA.        Dates: Start:  03/02/24

## 2024-03-06 NOTE — PROGRESS NOTES
NURSING DAILY NOTE    Name: Mariana Jett   Date of Admission: 3/1/2024   Admitting Diagnosis: Closed compression fracture of L2 lumbar vertebra, initial encounter ([facility])  Attending Physician: Wendy Pride D.o.  Allergies: Patient has no known allergies.    Safety  Patient Assist  min  Patient Precautions  Fall Risk, Spinal / Back Precautions   Precaution Comments     Bed Transfer Status  Standby Assist  Toilet Transfer Status   Standby Assist  Assistive Devices  Walker - front wheel  Oxygen  None - Room Air  Diet/Therapeutic Dining  Current Diet Order   Procedures    Diet Order Diet: Consistent CHO (Diabetic)     Pill Administration  whole  Agitated Behavioral Scale     ABS Level of Severity       Fall Risk  Has the patient had a fall this admission?   No  Hanh Brennan Fall Risk Scoring  17, HIGH RISK  Fall Risk Safety Measures  bed alarm and chair alarm    Vitals  Temperature: 36.8 °C (98.2 °F)  Temp src: Oral  Pulse: 86  Respiration: 18  Blood Pressure : (!) 145/62  Blood Pressure MAP (Calculated): 90 MM HG  BP Location: Right, Upper Arm  Patient BP Position: Supine     Oxygen  Pulse Oximetry: 95 %  O2 (LPM): 0  O2 Delivery Device: None - Room Air    Bowel and Bladder  Last Bowel Movement  03/03/24  Stool Type  Type 4: Like a sausage or snake, smooth and soft  Bowel Device  Bathroom  Continent  Bladder: Did not void   Bowel: No movement  Bladder Function  Urine Void (mL):  (large)  Number of Times Voided: 1  Urinary Options: Yes  Urine Color: Yellow  Genitourinary Assessment   Bladder Assessment (WDL):  WDL Except  Hartman Catheter: Not Applicable  Urine Color: Yellow  Bladder Device: Bathroom, Absorbent Brief (Pull Up)  Time Void: Yes  Bladder Scan: Post Void  $ Bladder Scan Results (mL): 1    Skin  Michael Score   18  Sensory Interventions   Bed Types: Standard/Trauma Mattress  Skin Preventative Measures: Pillows in Use for Support / Positioning, Waffle  Overlay  Moisture Interventions         Pain  Pain Rating Scale  3 - Sometimes distracts me  Pain Location  Back  Pain Location Orientation  Lower  Pain Interventions   Medication (see MAR)    ADLs    Bathing   Shower  Linen Change      Personal Hygiene  Moist Priyanka Wipes  Chlorhexidine Bath      Oral Care  Brushed Teeth  Teeth/Dentures     Shave     Nutrition Percentage Eaten  Lunch, Between 25-50% Consumed (30%)  Environmental Precautions  Treaded Slipper Socks on Patient, Personal Belongings, Wastebasket, Call Bell etc. in Easy Reach, Transferred to Stronger Side, Report Given to Other Health Care Providers Regarding Fall Risk, Bed in Low Position  Patient Turns/Positioning  Patient Turns Self from Side to Side  Patient Turns Assistance/Tolerance     Bed Positions  Bed Controls On  Head of Bed Elevated  Self regulated      Psychosocial/Neurologic Assessment  Psychosocial Assessment  Psychosocial (WDL):  Within Defined Limits  Neurologic Assessment  Neuro (WDL): Exceptions to WDL  Level of Consciousness: Alert  Orientation Level: Oriented to place, Oriented to situation, Oriented to person  Cognition: Appropriate judgement, Appropriate attention/concentration  Speech: Clear  Pupil Assesment: No  Muscle Strength Right Arm: Good Strength Against Gravity and Moderate Resistance  Muscle Strength Left Arm: Good Strength Against Gravity and Moderate Resistance  Muscle Strength Right Leg: Good Strength Against Gravity and Moderate Resistance  Muscle Strength Left Leg: Good Strength Against Gravity and Moderate Resistance  EENT (WDL):  WDL Except    Cardio/Pulmonary Assessment  Edema      Respiratory Breath Sounds  RUL Breath Sounds: Clear  RML Breath Sounds: Clear  RLL Breath Sounds: Diminished  IRAJ Breath Sounds: Clear  LLL Breath Sounds: Diminished  Cardiac Assessment   Cardiac (WDL):  WDL Except (hx htn)

## 2024-03-06 NOTE — PROGRESS NOTES
Hospital Medicine Daily Progress Note        Chief Complaint  Hypertension  Diabetes    Interval Problem Update  No significant events overnight.    Review of Systems  Review of Systems   Constitutional:  Negative for fever.   Eyes:  Negative for blurred vision.   Respiratory:  Negative for shortness of breath.    Cardiovascular:  Negative for palpitations.   Gastrointestinal:  Negative for nausea and vomiting.   Neurological:  Negative for dizziness and headaches.   Psychiatric/Behavioral:  Negative for hallucinations.         Physical Exam  Temp:  [36.4 °C (97.6 °F)-36.8 °C (98.2 °F)] 36.5 °C (97.7 °F)  Pulse:  [80-86] 80  Resp:  [17-18] 18  BP: (117-149)/(57-69) 149/69  SpO2:  [92 %-95 %] 92 %    Physical Exam  Vitals and nursing note reviewed.   Constitutional:       General: She is not in acute distress.  HENT:      Mouth/Throat:      Mouth: Mucous membranes are moist.      Pharynx: Oropharynx is clear.   Eyes:      General: No scleral icterus.  Neck:      Vascular: No JVD.   Cardiovascular:      Rate and Rhythm: Normal rate and regular rhythm.      Heart sounds: Normal heart sounds. No murmur heard.  Pulmonary:      Effort: Pulmonary effort is normal.      Breath sounds: Normal breath sounds. No stridor.   Abdominal:      General: There is no distension.      Palpations: Abdomen is soft.      Tenderness: There is no abdominal tenderness.   Musculoskeletal:      Cervical back: No rigidity.      Right lower leg: No edema.      Left lower leg: No edema.   Skin:     General: Skin is warm and dry.      Findings: No rash.   Neurological:      Mental Status: She is alert and oriented to person, place, and time.   Psychiatric:         Mood and Affect: Mood normal.         Behavior: Behavior normal.         Fluids    Intake/Output Summary (Last 24 hours) at 3/6/2024 1022  Last data filed at 3/6/2024 0835  Gross per 24 hour   Intake 680 ml   Output --   Net 680 ml       Laboratory  Recent Labs     03/04/24  0557   WBC  7.3   RBC 3.84*   HEMOGLOBIN 11.9*   HEMATOCRIT 35.8*   MCV 93.2   MCH 31.0   MCHC 33.2   RDW 43.6   PLATELETCT 380   MPV 8.1*     Recent Labs     03/04/24  0557   SODIUM 132*   POTASSIUM 4.3   CHLORIDE 97   CO2 23   GLUCOSE 148*   BUN 11   CREATININE 0.39*   CALCIUM 8.6                   Assessment/Plan  Lumbar compression fracture, closed, initial encounter ([facility])- (present on admission)  Assessment & Plan  2nd to F  S/P L2 kyphoplasty (2/29)    Dyslipidemia- (present on admission)  Assessment & Plan  Cont Crestor    Diabetes mellitus type II, controlled (formerly [facility])- (present on admission)  Assessment & Plan  Hba1c: 6.4  Cont Metformin  Off accuchecks  Note: home meds include Metformin 500 mg daily usually, but pt reports she takes twice daily as needed if FSBS >150    Hypertension- (present on admission)  Assessment & Plan  BP better recently but a little labile  Cont Norvasc  Cont Avapro  Cont to monitor

## 2024-03-06 NOTE — PROGRESS NOTES
Received bedside shift report Cristy ESTES RN regarding patient and assumed care. Patient awake, calm and stable, currently positioned in bed for comfort and safety; call light within reach. Denies pain or discomfort at this time. Will continue to monitor.

## 2024-03-06 NOTE — PROGRESS NOTES
Confirmed with pt last bowel movement of 3/3/24.  Offered prn Miralax this morning and pt agreed with it.  Will continue to monitor patient.

## 2024-03-06 NOTE — PROGRESS NOTES
Physical Medicine & Rehabilitation Progress Note    Encounter Date: 3/5/2024    Chief Complaint: Doing okay    Interval Events (Subjective):  Vital signs stable, more normal blood pressure  BM 3/5  Voiding volitionally    Patient seen and examined in her room.  She is about to work with physical therapy.  She states that her back pain is tolerable.  It easier for her to sit with her legs bent.    ROS: 14 point ROS negative unless otherwise specified in the HPI    Objective:  VITAL SIGNS: BP (!) 145/62   Pulse 86   Temp 36.8 °C (98.2 °F) (Oral)   Resp 18   Wt 47.9 kg (105 lb 9.6 oz)   SpO2 95%   BMI 17.18 kg/m²     GEN: No apparent distress  HEENT: Head normocephalic, atraumatic.  Sclera nonicteric bilaterally, no ocular discharge appreciated bilaterally.  CV: Extremities warm and well-perfused, no peripheral edema appreciated bilaterally.  SKIN: No appreciable skin breakdown on exposed areas of skin.  PSYCH: Mood and affect within normal limits.  NEURO: Awake alert.  Conversational.  Logical thought content.        Laboratory Values:  Recent Results (from the past 72 hour(s))   POCT glucose device results    Collection Time: 03/02/24  5:33 PM   Result Value Ref Range    POC Glucose, Blood 111 (H) 65 - 99 mg/dL   POCT glucose device results    Collection Time: 03/02/24  9:26 PM   Result Value Ref Range    POC Glucose, Blood 142 (H) 65 - 99 mg/dL   POCT glucose device results    Collection Time: 03/03/24  7:18 AM   Result Value Ref Range    POC Glucose, Blood 147 (H) 65 - 99 mg/dL   CBC WITHOUT DIFFERENTIAL    Collection Time: 03/04/24  5:57 AM   Result Value Ref Range    WBC 7.3 4.8 - 10.8 K/uL    RBC 3.84 (L) 4.20 - 5.40 M/uL    Hemoglobin 11.9 (L) 12.0 - 16.0 g/dL    Hematocrit 35.8 (L) 37.0 - 47.0 %    MCV 93.2 81.4 - 97.8 fL    MCH 31.0 27.0 - 33.0 pg    MCHC 33.2 32.2 - 35.5 g/dL    RDW 43.6 35.9 - 50.0 fL    Platelet Count 380 164 - 446 K/uL    MPV 8.1 (L) 9.0 - 12.9 fL   Basic Metabolic Panel     Collection Time: 03/04/24  5:57 AM   Result Value Ref Range    Sodium 132 (L) 135 - 145 mmol/L    Potassium 4.3 3.6 - 5.5 mmol/L    Chloride 97 96 - 112 mmol/L    Co2 23 20 - 33 mmol/L    Glucose 148 (H) 65 - 99 mg/dL    Bun 11 8 - 22 mg/dL    Creatinine 0.39 (L) 0.50 - 1.40 mg/dL    Calcium 8.6 8.5 - 10.5 mg/dL    Anion Gap 12.0 7.0 - 16.0   ESTIMATED GFR    Collection Time: 03/04/24  5:57 AM   Result Value Ref Range    GFR (CKD-EPI) 95 >60 mL/min/1.73 m 2       Medications:  Scheduled Medications   Medication Dose Frequency    amLODIPine  10 mg DAILY    Pharmacy Consult Request  1 Each PHARMACY TO DOSE    omeprazole  20 mg DAILY    enoxaparin (LOVENOX) injection  30 mg DAILY AT 1800    gabapentin  100 mg TID    irbesartan  300 mg DAILY    lidocaine  1 Patch Q24HR    metFORMIN  500 mg DAILY    methocarbamol  500 mg 4X/DAY    rosuvastatin  10 mg DAILY    vitamin D3  1,000 Units DAILY     PRN medications: Respiratory Therapy Consult, hydrALAZINE, carboxymethylcellulose, benzocaine-menthol, mag hydrox-al hydrox-simeth, ondansetron **OR** ondansetron, traZODone, sodium chloride, acetaminophen, oxyCODONE immediate-release **OR** oxyCODONE immediate-release, polyethylene glycol/lytes    Diet:  Current Diet Order   Procedures    Diet Order Diet: Consistent CHO (Diabetic)       Medical Decision Making and Plan:  L2 compression fracture secondary to fall at [facility] 2/27/2024  S/p L2 Cementoplasty 2/29/2024 Dr. Sawyer  PT and OT for mobility and ADLs. Per guidelines, 15 hours per week between PT, OT and/or SLP.  Follow-up with PCP  Restrictions: No strenuous activity, heavy lifting, twisting for 1-2 weeks.  Do not lift anything greater than 7 pounds for 1-2 weeks.     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 Chew  Follow-up orthospine as an outpatient     Hyponatremia  Fluctuates greater than 130 3/4      Leukocytosis  Being  treated for UTI  3/4 resolved     Anemia  3/4 fluctuant from 10.6 up to 13.1 down to 11.9 today  Monitor     Thrombocytosis  Resolved prior to admission  Resolved 3/4     Hypertension  Continue amlodipine 5 mg daily --> 10 mg daily 3/4  Continue Avapro 300 mg daily  3/4 blood pressure elevated, amlodipine increased per hospitalist.     Hyperlipidemia  Continue rosuvastatin 10 mg nightly     Type 2 diabetes mellitus with hyperglycemia  Sliding scale insulin discontinued.  Continue metformin 500 mg daily     Pain  As needed Tylenol  As needed oxycodone  As needed ice  Scheduled Robaxin 500 mg 4 times daily  Scheduled lidocaine patch every 24 hours  Scheduled gabapentin 100 mg 3 times daily     Skin  Patient at risk for skin breakdown due to debility in areas including sacrum, achilles, elbows and head in addition to other sites. Nursing to assess skin daily.      GI Ppx  Patient on Prilosec for GERD prophylaxis.      Bowel   Continue MiraLAX 1 packet twice daily PRN     Bladder  Pseudomonas UTI (onset prior to admission)  TV/PVR/BS PRN  3/4 Ciprofloxacin 500 mg twice daily for 5-day course through 3/5.         Upcoming Labs/imaging: 3/7     DVT PROPHYLAXIS: Lovenox 30 mg subcutaneously nightly (adjusted for weight)     HOSPITALIST FOLLOWING: Discussed with hospitalist 3/5     CODE STATUS: DNR/DNI     DISPO: Home with spouse     JAKE: TBD     MEDS SENT TO: TBD     DISCHARGE SPECIALIST FOLLOW UP: Orthospine     Patient to scheduled follow up with their PCP within 2 weeks from discharge from the [facility].      ____________________________________    Dr. Wendy Pride DO, MS  [facility] - Physical Medicine & Rehabilitation   ____________________________________

## 2024-03-06 NOTE — THERAPY
"Occupational Therapy  Daily Treatment     Patient Name: Mariana Jett  Age:  89 y.o., Sex:  female  Medical Record #: [id number]  Today's Date: 3/6/2024     Precautions  Precautions: Fall Risk, Spinal / Back Precautions          Subjective    \"I'm not sure where I'm going to go after my daughters. I'll stay with her for a little while, but she does not want me to go back to that [facility] I was at.\"      Objective       03/06/24 0701   OT Charge Group   OT Self Care / ADL (Units) 1   OT Therapy Activity (Units) 3   OT Total Time Spent   OT Individual Total Time Spent (Mins) 60   Cognition    Level of Consciousness Alert   Functional Level of Assist   Bed, Chair, Wheelchair Transfer Standby Assist   Bed Chair Wheelchair Transfer Description Adaptive equipment;Increased time;Initial preparation for task;Set-up of equipment;Supervision for safety;Non-hospital bed  (standing bed used in therapy gym with use of bed rail)   IADL Treatments   IADL Treatments Kitchen mobility education   Kitchen Mobility Education Educated pt on safety within the kitchen. Cones placed around kithen in high/low cabinets, fridge, and microwave- pt able to utilize reacher and FWW to gather cones and place onto counter top- cues needed for not twisting or bending. Pt completed  at SBA level.   Balance   Sitting Balance (Static) Good   Sitting Balance (Dynamic) Good   Standing Balance (Static) Fair   Standing Balance (Dynamic) Fair -   Bed Mobility    Supine to Sit Standby Assist   Sit to Supine Standby Assist   Scooting Modified Independent   Rolling Modified Independent   Interdisciplinary Plan of Care Collaboration   Patient Position at End of Therapy Seated;Chair Alarm On;Self Releasing Lap Belt Applied;Call Light within Reach;Tray Table within Reach;Phone within Reach         Assessment    Pt tolerated session well. Pt ambulated from room <> therapy kitchen ~35 ft x 2 with SBA and slow michele- cues needed to keep feet apart 2/2 very narrow LISETH. Pt " completed kitchen mobility with SBA- cues needed for using reacher to not bend and not twisting- spinal precautions. Pt plans to d/c home with daughter for a little while. Pt engaged in getting in/out of bed using bed rail- pt stating that daughter has bed rail on bed for her at home. Pt then completed LBD- doffing/donning socks/shoes seated in w/c. Pt able to complete with SBA- cues needed for use of AE- reacher and dressing stick. Pt using figure 4 positioning to get socks on and using AE to doff socks and shoes. Pt then completed functional mobility around the gym using FWW and SBA ~130 ft with SBA. Pt demonstrated good safety awareness with w/c and making sure to lock brakes prior to standing and push up/reach back for w/c during STS's and stand to sit.     Strengths: Able to follow instructions, Effective communication skills, Good insight into deficits/needs, Independent prior level of function, Motivated for self care and independence, Pleasant and cooperative, Willingly participates in therapeutic activities  Barriers: Generalized weakness, Impaired balance, Impaired activity tolerance, Pain, Decreased endurance, Bladder incontinence    Plan    ADLs  Standing tolerance/balance  Strengthening, endurance    Occupational Therapy Goals (Active)       Problem: Dressing       Dates: Start:  03/02/24         Goal: STG-Within one week, patient will dress LB       Dates: Start:  03/02/24       Description: At a Min A level with AE/AD PRN and ongoing adherence to spinal precautions.            Problem: IADL's       Dates: Start:  03/02/24         Goal: STG-Within one week, patient will access kitchen area       Dates: Start:  03/02/24       Description: At a SUP level with LRD and seated RB as needed.             Problem: OT Long Term Goals       Dates: Start:  03/02/24         Goal: LTG-By discharge, patient will complete basic self care tasks       Dates: Start:  03/02/24       Description: At a Min A to Mod I level  with AE/AD/DME PRN.          Goal: LTG-By discharge, patient will perform bathroom transfers       Dates: Start:  03/02/24       Description: At a SUP to Mod I level with AE/AD/DME PRN.             Problem: Toileting       Dates: Start:  03/02/24         Goal: STG-Within one week, patient will complete toileting tasks       Dates: Start:  03/02/24       Description: a Min A level with AE/AD PRN and ongoing adherence to spinal precautions.

## 2024-03-06 NOTE — PROGRESS NOTES
NURSING DAILY NOTE    Name: Mariana Jett   Date of Admission: 3/1/2024   Admitting Diagnosis: Closed compression fracture of L2 lumbar vertebra, initial encounter ([facility])  Attending Physician: Wendy Pride D.o.  Allergies: Patient has no known allergies.    Safety  Patient Assist  Min Assist  Patient Precautions  Fall Risk, Spinal / Back Precautions   Precaution Comments     Bed Transfer Status  Standby Assist  Toilet Transfer Status   Standby Assist  Assistive Devices  Walker - four wheel  Oxygen  None - Room Air  Diet/Therapeutic Dining  Current Diet Order   Procedures    Diet Order Diet: Consistent CHO (Diabetic)     Pill Administration  whole  Agitated Behavioral Scale     ABS Level of Severity       Fall Risk  Has the patient had a fall this admission?   No  Hanh Brennan Fall Risk Scoring  17, HIGH RISK  Fall Risk Safety Measures  Bed strip alarm    Vitals  Temperature: 36.8 °C (98.2 °F)  Temp src: Oral  Pulse: 80  Respiration: 18  Blood Pressure : (!) 149/69  Blood Pressure MAP (Calculated): 96 MM HG  BP Location: Right, Upper Arm  Patient BP Position: Supine     Oxygen  Pulse Oximetry: 95 %  O2 (LPM): 0  O2 Delivery Device: None - Room Air    Bowel and Bladder  Last Bowel Movement  03/03/24  Stool Type  Type 4: Like a sausage or snake, smooth and soft  Bowel Device  Bathroom, Other (Comment)  Continent  Bladder: Did not void   Bowel: No movement  Bladder Function  Urine Void (mL):  (MOderate)  Number of Times Voided: 1  Urinary Options: Yes  Urine Color: Yellow  Genitourinary Assessment   Bladder Assessment (WDL):  Within Defined Limits  Hartman Catheter: Not Applicable  Urine Color: Yellow  Bladder Device: Bathroom  Time Void: Yes  Bladder Scan: Post Void  $ Bladder Scan Results (mL): 1    Skin  Michael Score   18  Sensory Interventions   Bed Types: Standard/Trauma Mattress with Overlay  Skin Preventative Measures: Pillows in Use for Support /  Positioning, Waffle Overlay  Moisture Interventions         Pain  Pain Rating Scale  7 - Focus of attention, prevents doing daily activities  Pain Location  Back  Pain Location Orientation  Lower  Pain Interventions   Rest    ADLs    Bathing   Shower  Linen Change      Personal Hygiene  Moist Priyanka Wipes  Chlorhexidine Bath      Oral Care  Brushed Teeth  Teeth/Dentures     Shave     Nutrition Percentage Eaten  Lunch, Between 25-50% Consumed (30%)  Environmental Precautions  Treaded Slipper Socks on Patient, Bed in Low Position  Patient Turns/Positioning  Patient Turns Self from Side to Side  Patient Turns Assistance/Tolerance     Bed Positions  Bed Controls On, Bed Locked  Head of Bed Elevated  Self regulated      Psychosocial/Neurologic Assessment  Psychosocial Assessment  Psychosocial (WDL):  Within Defined Limits  Neurologic Assessment  Neuro (WDL): Exceptions to WDL  Level of Consciousness: Alert  Orientation Level: Oriented to place, Oriented to situation, Oriented to person  Cognition: Appropriate judgement, Appropriate attention/concentration  Speech: Clear  Pupil Assesment: No  Muscle Strength Right Arm: Good Strength Against Gravity and Moderate Resistance  Muscle Strength Left Arm: Good Strength Against Gravity and Moderate Resistance  Muscle Strength Right Leg: Good Strength Against Gravity and Moderate Resistance  Muscle Strength Left Leg: Good Strength Against Gravity and Moderate Resistance  EENT (WDL):  WDL Except    Cardio/Pulmonary Assessment  Edema      Respiratory Breath Sounds  RUL Breath Sounds: Clear  RML Breath Sounds: Clear  RLL Breath Sounds: Clear, Diminished  IRAJ Breath Sounds: Clear  LLL Breath Sounds: Clear, Diminished  Cardiac Assessment   Cardiac (WDL):  WDL Except (H/O HTN.)

## 2024-03-06 NOTE — THERAPY
"Occupational Therapy  Daily Treatment     Patient Name: Mariana Jett  Age:  89 y.o., Sex:  female  Medical Record #: [id number]  Today's Date: 3/6/2024     Precautions  Precautions: Fall Risk, Spinal / Back Precautions          Subjective  Pt retrieved in w/c. Agreeable to OT session.   At end of session, pt reported that she does not like her FWW because \"it moves when I try to stand\".      Objective     03/06/24 0831   OT Charge Group   OT Therapy Activity (Units) 2   OT Total Time Spent   OT Individual Total Time Spent (Mins) 30   Functional Level of Assist   Bed, Chair, Wheelchair Transfer Supervised  (FWW > EOB)   Balance   Comments Pt completed balance activity in // bars, see note below for details.   Bed Mobility    Sit to Supine Supervised   Interdisciplinary Plan of Care Collaboration   Patient Position at End of Therapy Seated;Bed Alarm On;In Bed;Call Light within Reach;Tray Table within Reach;Phone within Reach   Occupational Therapist Assigned   Assigned OT / Treatment Time / Comments NV; 30-60     Recalled 2/3 spinal precautions at start of session. Re-oriented to spinal precautions. Recalled 3/3 at end of session.     Pt completed ring toss activity in // bars to improve adherence to spinal precautions during function. Pt used reacher to retrieve rings from floor. With rings placed out of midline, pt pivoted to close proximity of rings to reach.    Completed fxl mobility main gym > bedroom at FWW level with SBA    Assessment  Patient had good tolerance to session. She adhered to spinal precautions well during fxl therapy activities and self-corrected errors without external cueing.   Strengths: Able to follow instructions, Effective communication skills, Good insight into deficits/needs, Independent prior level of function, Motivated for self care and independence, Pleasant and cooperative, Willingly participates in therapeutic activities  Barriers: Generalized weakness, Impaired balance, Impaired activity " tolerance, Pain, Decreased endurance, Bladder incontinence    Plan  Blocked STS with FWW  ADLs with spinal precautions   Standing tolerance/balance  Strengthening, endurance    DME       Passport items to be completed:  Perform bathroom transfers, complete dressing, complete feeding, get ready for the day, prepare a simple meal, participate in household tasks, adapt home for safety needs, demonstrate home exercise program, complete caregiver training     Occupational Therapy Goals (Active)       Problem: Dressing       Dates: Start:  03/02/24         Goal: STG-Within one week, patient will dress LB       Dates: Start:  03/02/24       Description: At a Min A level with AE/AD PRN and ongoing adherence to spinal precautions.            Problem: IADL's       Dates: Start:  03/02/24         Goal: STG-Within one week, patient will access kitchen area       Dates: Start:  03/02/24       Description: At a SUP level with LRD and seated RB as needed.             Problem: OT Long Term Goals       Dates: Start:  03/02/24         Goal: LTG-By discharge, patient will complete basic self care tasks       Dates: Start:  03/02/24       Description: At a Min A to Mod I level with AE/AD/DME PRN.          Goal: LTG-By discharge, patient will perform bathroom transfers       Dates: Start:  03/02/24       Description: At a SUP to Mod I level with AE/AD/DME PRN.             Problem: Toileting       Dates: Start:  03/02/24         Goal: STG-Within one week, patient will complete toileting tasks       Dates: Start:  03/02/24       Description: a Min A level with AE/AD PRN and ongoing adherence to spinal precautions.

## 2024-03-06 NOTE — PROGRESS NOTES
Patient care assumed. Report received from [facility] MISBAH Griffith. Patient is alert and calm, resting in bed. Call light and bedside table within reach. Will continue to monitor.

## 2024-03-06 NOTE — THERAPY
Physical Therapy   Daily Treatment     Patient Name: Mariana Jett  Age:  89 y.o., Sex:  female  Medical Record #: [id number]  Today's Date: 3/5/2024     Precautions  Precautions: Fall Risk, Spinal / Back Precautions     Subjective    Pt resting in bed, willing to participate.     Objective       03/05/24 1501   PT Charge Group   PT Gait Training (Units) 2   PT Neuromuscular Re-Education / Balance (Units) 1   PT Therapeutic Activities (Units) 1   PT Total Time Spent   PT Individual Total Time Spent (Mins) 60   Gait Functional Level of Assist    Gait Level Of Assist Standby Assist   Assistive Device Front Wheel Walker   Distance (Feet) 200   # of Times Distance was Traveled 1   Deviation Decreased Base Of Support;Bradykinetic;Decreased Heel Strike;Decreased Toe Off   Stairs Functional Level of Assist   Level of Assist with Stairs Minimal Assist  (CGA to ascend/ min A to descend)   # of Stairs Climbed 4  (standard rise steps)   Stairs Description Extra time;Hand rails  (step to)   Transfer Functional Level of Assist   Bed, Chair, Wheelchair Transfer Standby Assist   Bed Chair Wheelchair Transfer Description Adaptive equipment;Increased time;Set-up of equipment   Toilet Transfers Standby Assist   Toilet Transfer Description Adaptive equipment;Grab bar;Increased time;Set-up of equipment   Bed Mobility    Supine to Sit Standby Assist   Sit to Supine Standby Assist   Sit to Stand Standby Assist   Scooting Modified Independent   Rolling Modified Independent   Neuro-Muscular Treatments   Neuro-Muscular Treatments Anterior weight shift;Sequencing;Tactile Cuing;Verbal Cuing;Weight Shift Left;Weight Shift Right   Comments neuro re-ed gait/ pre-gait stepping and wt shifting activity with UE support at // bars.     Pre-gait/ gait training activity in // bars to include:  Side stepping to target dots with lateral wt shifting 3 x 10, side stepping through small hurdles with manual cues for increased side stride  length, forward/backward  stepping with anterior/ posterior wt shifting to target dots, forward walking/ stride through over small hurdles, and forward/ backward walking to maintain equal strides forward/ backward.      Assessment    Pt tolerated all activity well, continues to require intermittent cues for increased step length and LISETH during ambulation with FWW but able to actively attend and increased stride length as well as hip abduction/ strength training during structured activity.     Strengths: Able to follow instructions, Alert and oriented, Manages pain appropriately, Motivated for self care and independence, Pleasant and cooperative, Supportive family, Willingly participates in therapeutic activities  Barriers: Decreased endurance, Fatigue, Impaired activity tolerance, Impaired balance, Pain, Generalized weakness    Plan    Gait training with FWW, balance training, stair training, overall activity tolerance/endurance/ strength training.      DME       Passport items to be completed:  Get in/out of bed safely, in/out of a vehicle, safely use mobility device, walk or wheel around home/community, navigate up and down stairs, show how to get up/down from the ground, ensure home is accessible, demonstrate HEP, complete caregiver training    Physical Therapy Problems (Active)       Problem: Balance       Dates: Start:  03/02/24         Goal: STG-Within one week, patient will maintain static standing balance with narrow LISETH and no UE support, for at least 30 seconds.        Dates: Start:  03/02/24               Problem: Mobility       Dates: Start:  03/02/24         Goal: STG-Within one week, patient will ambulate at least 100 feet with LRAD and CGA.        Dates: Start:  03/02/24            Goal: STG-Within one week, patient will ambulate up/down a curb with LRAD and min A.        Dates: Start:  03/02/24            Goal: STG-Within one week, patient will ascend and descend four to six stairs with rails and CGA.        Dates: Start:   03/02/24               Problem: PT-Long Term Goals       Dates: Start:  03/02/24         Goal: LTG-By discharge, patient will ambulate at least 150 feet with LRAD and supervision.        Dates: Start:  03/02/24            Goal: LTG-By discharge, patient will transfer one surface to another with LRAD and supervision.        Dates: Start:  03/02/24            Goal: LTG-By discharge, patient will ambulate up/down 4-6 stairs with B rails and SBA.        Dates: Start:  03/02/24            Goal: LTG-By discharge, patient will transfer in/out of a car with LRAD and SBA.        Dates: Start:  03/02/24

## 2024-03-07 ENCOUNTER — APPOINTMENT (OUTPATIENT)
Dept: PHYSICAL THERAPY | Facility: REHABILITATION | Age: 89
DRG: 560 | End: 2024-03-07
Attending: PHYSICAL MEDICINE & REHABILITATION
Payer: MEDICARE

## 2024-03-07 ENCOUNTER — APPOINTMENT (OUTPATIENT)
Dept: OCCUPATIONAL THERAPY | Facility: REHABILITATION | Age: 89
DRG: 560 | End: 2024-03-07
Attending: PHYSICAL MEDICINE & REHABILITATION
Payer: MEDICARE

## 2024-03-07 LAB
ALBUMIN SERPL BCP-MCNC: 4 G/DL (ref 3.2–4.9)
ALBUMIN/GLOB SERPL: 1.6 G/DL
ALP SERPL-CCNC: 133 U/L (ref 30–99)
ALT SERPL-CCNC: 14 U/L (ref 2–50)
ANION GAP SERPL CALC-SCNC: 13 MMOL/L (ref 7–16)
AST SERPL-CCNC: 19 U/L (ref 12–45)
BASOPHILS # BLD AUTO: 0.7 % (ref 0–1.8)
BASOPHILS # BLD: 0.05 K/UL (ref 0–0.12)
BILIRUB SERPL-MCNC: 0.6 MG/DL (ref 0.1–1.5)
BUN SERPL-MCNC: 9 MG/DL (ref 8–22)
CALCIUM ALBUM COR SERPL-MCNC: 9.1 MG/DL (ref 8.5–10.5)
CALCIUM SERPL-MCNC: 9.1 MG/DL (ref 8.5–10.5)
CHLORIDE SERPL-SCNC: 98 MMOL/L (ref 96–112)
CO2 SERPL-SCNC: 22 MMOL/L (ref 20–33)
CREAT SERPL-MCNC: 0.32 MG/DL (ref 0.5–1.4)
EOSINOPHIL # BLD AUTO: 0.18 K/UL (ref 0–0.51)
EOSINOPHIL NFR BLD: 2.6 % (ref 0–6.9)
ERYTHROCYTE [DISTWIDTH] IN BLOOD BY AUTOMATED COUNT: 42.5 FL (ref 35.9–50)
GFR SERPLBLD CREATININE-BSD FMLA CKD-EPI: 100 ML/MIN/1.73 M 2
GLOBULIN SER CALC-MCNC: 2.5 G/DL (ref 1.9–3.5)
GLUCOSE SERPL-MCNC: 136 MG/DL (ref 65–99)
HCT VFR BLD AUTO: 39.1 % (ref 37–47)
HGB BLD-MCNC: 13.4 G/DL (ref 12–16)
IMM GRANULOCYTES # BLD AUTO: 0.07 K/UL (ref 0–0.11)
IMM GRANULOCYTES NFR BLD AUTO: 1 % (ref 0–0.9)
LYMPHOCYTES # BLD AUTO: 1.35 K/UL (ref 1–4.8)
LYMPHOCYTES NFR BLD: 19.7 % (ref 22–41)
MCH RBC QN AUTO: 31.3 PG (ref 27–33)
MCHC RBC AUTO-ENTMCNC: 34.3 G/DL (ref 32.2–35.5)
MCV RBC AUTO: 91.4 FL (ref 81.4–97.8)
MONOCYTES # BLD AUTO: 1.05 K/UL (ref 0–0.85)
MONOCYTES NFR BLD AUTO: 15.3 % (ref 0–13.4)
NEUTROPHILS # BLD AUTO: 4.16 K/UL (ref 1.82–7.42)
NEUTROPHILS NFR BLD: 60.7 % (ref 44–72)
NRBC # BLD AUTO: 0 K/UL
NRBC BLD-RTO: 0 /100 WBC (ref 0–0.2)
PLATELET # BLD AUTO: 358 K/UL (ref 164–446)
PMV BLD AUTO: 8 FL (ref 9–12.9)
POTASSIUM SERPL-SCNC: 4.2 MMOL/L (ref 3.6–5.5)
PROT SERPL-MCNC: 6.5 G/DL (ref 6–8.2)
RBC # BLD AUTO: 4.28 M/UL (ref 4.2–5.4)
SODIUM SERPL-SCNC: 133 MMOL/L (ref 135–145)
WBC # BLD AUTO: 6.9 K/UL (ref 4.8–10.8)

## 2024-03-07 PROCEDURE — 700111 HCHG RX REV CODE 636 W/ 250 OVERRIDE (IP): Performed by: PHYSICAL MEDICINE & REHABILITATION

## 2024-03-07 PROCEDURE — 80053 COMPREHEN METABOLIC PANEL: CPT

## 2024-03-07 PROCEDURE — A9270 NON-COVERED ITEM OR SERVICE: HCPCS | Performed by: PHYSICAL MEDICINE & REHABILITATION

## 2024-03-07 PROCEDURE — 99231 SBSQ HOSP IP/OBS SF/LOW 25: CPT | Performed by: HOSPITALIST

## 2024-03-07 PROCEDURE — 700102 HCHG RX REV CODE 250 W/ 637 OVERRIDE(OP): Performed by: PHYSICAL MEDICINE & REHABILITATION

## 2024-03-07 PROCEDURE — 97110 THERAPEUTIC EXERCISES: CPT

## 2024-03-07 PROCEDURE — 700102 HCHG RX REV CODE 250 W/ 637 OVERRIDE(OP): Performed by: HOSPITALIST

## 2024-03-07 PROCEDURE — 97116 GAIT TRAINING THERAPY: CPT

## 2024-03-07 PROCEDURE — 36415 COLL VENOUS BLD VENIPUNCTURE: CPT

## 2024-03-07 PROCEDURE — 770010 HCHG ROOM/CARE - REHAB SEMI PRIVAT*

## 2024-03-07 PROCEDURE — A9270 NON-COVERED ITEM OR SERVICE: HCPCS | Performed by: HOSPITALIST

## 2024-03-07 PROCEDURE — 99232 SBSQ HOSP IP/OBS MODERATE 35: CPT | Performed by: PHYSICAL MEDICINE & REHABILITATION

## 2024-03-07 PROCEDURE — 700101 HCHG RX REV CODE 250: Performed by: PHYSICAL MEDICINE & REHABILITATION

## 2024-03-07 PROCEDURE — 97535 SELF CARE MNGMENT TRAINING: CPT

## 2024-03-07 PROCEDURE — 97112 NEUROMUSCULAR REEDUCATION: CPT

## 2024-03-07 PROCEDURE — 85025 COMPLETE CBC W/AUTO DIFF WBC: CPT

## 2024-03-07 RX ORDER — METHOCARBAMOL 500 MG/1
500 TABLET, FILM COATED ORAL 3 TIMES DAILY
Status: DISCONTINUED | OUTPATIENT
Start: 2024-03-07 | End: 2024-03-10 | Stop reason: HOSPADM

## 2024-03-07 RX ORDER — POLYETHYLENE GLYCOL 3350 17 G/17G
1 POWDER, FOR SOLUTION ORAL
Status: ACTIVE | OUTPATIENT
Start: 2024-03-07 | End: 2024-03-07

## 2024-03-07 RX ADMIN — METHOCARBAMOL 500 MG: 500 TABLET ORAL at 15:41

## 2024-03-07 RX ADMIN — OMEPRAZOLE 20 MG: 20 CAPSULE, DELAYED RELEASE ORAL at 08:54

## 2024-03-07 RX ADMIN — METHOCARBAMOL 500 MG: 500 TABLET ORAL at 20:27

## 2024-03-07 RX ADMIN — METFORMIN HYDROCHLORIDE 500 MG: 500 TABLET ORAL at 08:54

## 2024-03-07 RX ADMIN — METHOCARBAMOL 500 MG: 500 TABLET ORAL at 08:54

## 2024-03-07 RX ADMIN — DOCUSATE SODIUM 50MG AND SENNOSIDES 8.6MG 2 TABLET: 8.6; 5 TABLET, FILM COATED ORAL at 20:27

## 2024-03-07 RX ADMIN — GABAPENTIN 100 MG: 100 CAPSULE ORAL at 08:54

## 2024-03-07 RX ADMIN — OXYCODONE 5 MG: 5 TABLET ORAL at 20:25

## 2024-03-07 RX ADMIN — IRBESARTAN 300 MG: 150 TABLET ORAL at 05:31

## 2024-03-07 RX ADMIN — Medication 1000 UNITS: at 08:54

## 2024-03-07 RX ADMIN — LIDOCAINE 1 PATCH: 4 PATCH TOPICAL at 05:32

## 2024-03-07 RX ADMIN — AMLODIPINE BESYLATE 10 MG: 5 TABLET ORAL at 05:32

## 2024-03-07 RX ADMIN — ENOXAPARIN SODIUM 30 MG: 100 INJECTION SUBCUTANEOUS at 18:02

## 2024-03-07 RX ADMIN — ROSUVASTATIN CALCIUM 10 MG: 10 TABLET, COATED ORAL at 08:53

## 2024-03-07 RX ADMIN — GABAPENTIN 100 MG: 100 CAPSULE ORAL at 15:41

## 2024-03-07 RX ADMIN — GABAPENTIN 100 MG: 100 CAPSULE ORAL at 20:27

## 2024-03-07 RX ADMIN — POLYETHYLENE GLYCOL 3350 1 PACKET: 17 POWDER, FOR SOLUTION ORAL at 09:11

## 2024-03-07 ASSESSMENT — GAIT ASSESSMENTS
GAIT LEVEL OF ASSIST: STANDBY ASSIST
DEVIATION: DECREASED BASE OF SUPPORT;BRADYKINETIC
DISTANCE (FEET): 210
ASSISTIVE DEVICE: FRONT WHEEL WALKER

## 2024-03-07 ASSESSMENT — ENCOUNTER SYMPTOMS
NERVOUS/ANXIOUS: 0
DIARRHEA: 0
COUGH: 0
FEVER: 0
DIZZINESS: 0
BLURRED VISION: 0

## 2024-03-07 ASSESSMENT — ACTIVITIES OF DAILY LIVING (ADL)
BED_CHAIR_WHEELCHAIR_TRANSFER_DESCRIPTION: ADAPTIVE EQUIPMENT;SET-UP OF EQUIPMENT;VERBAL CUEING
TOILETING_LEVEL_OF_ASSIST_DESCRIPTION: SUPERVISION FOR SAFETY;VERBAL CUEING
TOILETING_LEVEL_OF_ASSIST_DESCRIPTION: SUPERVISION FOR SAFETY;INCREASED TIME;GRAB BAR
TOILET_TRANSFER_DESCRIPTION: ADAPTIVE EQUIPMENT;INCREASED TIME;SUPERVISION FOR SAFETY

## 2024-03-07 NOTE — CARE PLAN
"  Problem: Fall Risk - Rehab  Goal: Patient will remain free from falls  Outcome: Progressing  Note: Schafer Mika Fall risk Assessment Score: 17    High fall risk Interventions   - Bed and strip alarm   - Yellow sign by the door   - Yellow wrist band \"Fall risk\"  - Room near to the nurse station  - Do not leave patient unattended in the bathroom  - Fall risk education provided      Problem: Pain - Standard  Goal: Alleviation of pain or a reduction in pain to the patient’s comfort goal  Outcome: Progressing  Flowsheets (Taken 3/7/2024 0312)  OB Pain Intervention: Emotional support  Note: Assessed for pain and discomfort , , pain under control, needs anticipated and attended. Refused oral med meds ,despite explanation of it's importance,heparin sq given .[ [See mar] .     The patient is Stable - Low risk of patient condition declining or worsening    Shift Goals  Clinical Goals: safety  Patient Goals: safety    Progress made toward(s) clinical / shift goals:  Pt free from fall and injury.    "

## 2024-03-07 NOTE — PROGRESS NOTES
NURSING DAILY NOTE    Name: Mariana Jett   Date of Admission: 3/1/2024   Admitting Diagnosis: Closed compression fracture of L2 lumbar vertebra, initial encounter ([facility])  Attending Physician: Wendy Pride D.o.  Allergies: Patient has no known allergies.    Safety  Patient Assist  Min assist  Patient Precautions  Fall Risk, Spinal / Back Precautions   Precaution Comments     Bed Transfer Status  Standby Assist  Toilet Transfer Status   Supervised (FWW <> toilet)  Assistive Devices  Rails, Wheelchair  Oxygen  None - Room Air  Diet/Therapeutic Dining  Current Diet Order   Procedures    Diet Order Diet: Consistent CHO (Diabetic)     Pill Administration  whole  Agitated Behavioral Scale     ABS Level of Severity       Fall Risk  Has the patient had a fall this admission?   No  Hanh Brennan Fall Risk Scoring  17, HIGH RISK  Fall Risk Safety Measures  bed alarm, chair alarm, and poor balance    Vitals    Temperature: 36.7 °C (98 °F)  Temp src: Oral  Pulse: 74  Respiration: 16  Blood Pressure : 109/60  Blood Pressure MAP (Calculated): 76 MM HG  BP Location: Right, Upper Arm  Patient BP Position: Supine     Oxygen  Pulse Oximetry: 95 %  O2 (LPM): 0  O2 Delivery Device: None - Room Air    Bowel and Bladder  Last Bowel Movement  03/07/24  Stool Type  Type 4: Like a sausage or snake, smooth and soft  Bowel Device  Bathroom, Other (Comment)  Continent  Bladder: Did not void   Bowel: No movement  Bladder Function  Urine Void (mL):  (bathroom)  Number of Times Voided: 1  Urinary Options: Yes  Urine Color: Unable To Evaluate  Genitourinary Assessment   Bladder Assessment (WDL):  Within Defined Limits  Hartman Catheter: Not Applicable  Urine Color: Unable To Evaluate  Bladder Device: Bathroom  Time Void: Yes  Bladder Scan: Post Void  $ Bladder Scan Results (mL): 1    Skin  Michael Score   18  Sensory Interventions   Bed Types: Standard/Trauma Mattress  Skin Preventative  Measures: Pillows in Use for Support / Positioning  Moisture Interventions         Pain  Pain Rating Scale  0 - No Pain  Pain Location  Back  Pain Location Orientation  Lower  Pain Interventions   [place], Declines    ADLs    Bathing   Shower  Linen Change   Partial  Personal Hygiene  Moist Priyanka Wipes  Chlorhexidine Bath      Oral Care  Brushed Teeth  Teeth/Dentures     Shave     Nutrition Percentage Eaten  Lunch, Between % Consumed  Environmental Precautions  Treaded Slipper Socks on Patient, Bed in Low Position  Patient Turns/Positioning  Left Side  Patient Turns Assistance/Tolerance     Bed Positions  Bed Controls On, Bed Locked  Head of Bed Elevated  Self regulated      Psychosocial/Neurologic Assessment  Psychosocial Assessment  Psychosocial (WDL):  Within Defined Limits  Neurologic Assessment  Neuro (WDL): Exceptions to WDL  Level of Consciousness: Alert  Orientation Level: Oriented to place, Oriented to situation, Oriented to person  Cognition: Appropriate judgement, Appropriate attention/concentration  Speech: Clear  Pupil Assesment: No  Muscle Strength Right Arm: Good Strength Against Gravity and Moderate Resistance  Muscle Strength Left Arm: Good Strength Against Gravity and Moderate Resistance  Muscle Strength Right Leg: Good Strength Against Gravity and Moderate Resistance  Muscle Strength Left Leg: Good Strength Against Gravity and Moderate Resistance  EENT (WDL):  WDL Except    Cardio/Pulmonary Assessment  Edema      Respiratory Breath Sounds  RUL Breath Sounds: Clear  RML Breath Sounds: Clear  RLL Breath Sounds: Clear, Diminished  IRAJ Breath Sounds: Clear  LLL Breath Sounds: Clear, Diminished  Cardiac Assessment   Cardiac (WDL):  WDL Except (HTN)

## 2024-03-07 NOTE — PROGRESS NOTES
Physical Medicine & Rehabilitation Progress Note    Encounter Date: 3/7/2024    Chief Complaint: Feels well    Interval Events (Subjective):  Vital signs: Intermittent elevated blood pressures 149, 150 systolic  Has not had bowel movement recently  Voiding volitionally    Patient seen and examined in her room.  States that she needs to poop.  Has no other questions or concerns.  Not sure which pharmacy she is going to use.    ROS: 14 point ROS negative unless otherwise specified in the HPI    Objective:  VITAL SIGNS: /60   Pulse 74   Temp 36.7 °C (98 °F) (Oral)   Resp 16   Wt 47.9 kg (105 lb 9.6 oz)   SpO2 95%   BMI 17.18 kg/m²     GEN: No apparent distress  HEENT: Head normocephalic, atraumatic.  Sclera nonicteric bilaterally, no ocular discharge appreciated bilaterally.  CV: Extremities warm and well-perfused, no peripheral edema appreciated bilaterally.  PULMONARY: Breathing nonlabored on room air, no respiratory accessory muscle use.  Not requiring supplemental oxygen.  ABD: Soft, nontender.  SKIN: No appreciable skin breakdown on exposed areas of skin.  PSYCH: Mood and affect within normal limits.  NEURO: Awake alert.  Conversational.  Logical thought content.      Laboratory Values:  Recent Results (from the past 72 hour(s))   CBC WITH DIFFERENTIAL    Collection Time: 03/07/24  5:54 AM   Result Value Ref Range    WBC 6.9 4.8 - 10.8 K/uL    RBC 4.28 4.20 - 5.40 M/uL    Hemoglobin 13.4 12.0 - 16.0 g/dL    Hematocrit 39.1 37.0 - 47.0 %    MCV 91.4 81.4 - 97.8 fL    MCH 31.3 27.0 - 33.0 pg    MCHC 34.3 32.2 - 35.5 g/dL    RDW 42.5 35.9 - 50.0 fL    Platelet Count 358 164 - 446 K/uL    MPV 8.0 (L) 9.0 - 12.9 fL    Neutrophils-Polys 60.70 44.00 - 72.00 %    Lymphocytes 19.70 (L) 22.00 - 41.00 %    Monocytes 15.30 (H) 0.00 - 13.40 %    Eosinophils 2.60 0.00 - 6.90 %    Basophils 0.70 0.00 - 1.80 %    Immature Granulocytes 1.00 (H) 0.00 - 0.90 %    Nucleated RBC 0.00 0.00 - 0.20 /100 WBC    Neutrophils  (Absolute) 4.16 1.82 - 7.42 K/uL    Lymphs (Absolute) 1.35 1.00 - 4.80 K/uL    Monos (Absolute) 1.05 (H) 0.00 - 0.85 K/uL    Eos (Absolute) 0.18 0.00 - 0.51 K/uL    Baso (Absolute) 0.05 0.00 - 0.12 K/uL    Immature Granulocytes (abs) 0.07 0.00 - 0.11 K/uL    NRBC (Absolute) 0.00 K/uL   Comp Metabolic Panel    Collection Time: 03/07/24  5:54 AM   Result Value Ref Range    Sodium 133 (L) 135 - 145 mmol/L    Potassium 4.2 3.6 - 5.5 mmol/L    Chloride 98 96 - 112 mmol/L    Co2 22 20 - 33 mmol/L    Anion Gap 13.0 7.0 - 16.0    Glucose 136 (H) 65 - 99 mg/dL    Bun 9 8 - 22 mg/dL    Creatinine 0.32 (L) 0.50 - 1.40 mg/dL    Calcium 9.1 8.5 - 10.5 mg/dL    Correct Calcium 9.1 8.5 - 10.5 mg/dL    AST(SGOT) 19 12 - 45 U/L    ALT(SGPT) 14 2 - 50 U/L    Alkaline Phosphatase 133 (H) 30 - 99 U/L    Total Bilirubin 0.6 0.1 - 1.5 mg/dL    Albumin 4.0 3.2 - 4.9 g/dL    Total Protein 6.5 6.0 - 8.2 g/dL    Globulin 2.5 1.9 - 3.5 g/dL    A-G Ratio 1.6 g/dL   ESTIMATED GFR    Collection Time: 03/07/24  5:54 AM   Result Value Ref Range    GFR (CKD-EPI) 100 >60 mL/min/1.73 m 2       Medications:  Scheduled Medications   Medication Dose Frequency    senna-docusate  2 Tablet Q EVENING    amLODIPine  10 mg DAILY    Pharmacy Consult Request  1 Each PHARMACY TO DOSE    omeprazole  20 mg DAILY    enoxaparin (LOVENOX) injection  30 mg DAILY AT 1800    gabapentin  100 mg TID    irbesartan  300 mg DAILY    lidocaine  1 Patch Q24HR    metFORMIN  500 mg DAILY    methocarbamol  500 mg 4X/DAY    rosuvastatin  10 mg DAILY    vitamin D3  1,000 Units DAILY     PRN medications: Respiratory Therapy Consult, hydrALAZINE, carboxymethylcellulose, benzocaine-menthol, mag hydrox-al hydrox-simeth, ondansetron **OR** ondansetron, traZODone, sodium chloride, acetaminophen, oxyCODONE immediate-release **OR** oxyCODONE immediate-release, polyethylene glycol/lytes    Diet:  Current Diet Order   Procedures    Diet Order Diet: Consistent CHO (Diabetic)       Medical  Decision Making and Plan:  L2 compression fracture secondary to fall at [facility] 2/27/2024  S/p L2 Cementoplasty 2/29/2024 Dr. Sawyer  PT and OT for mobility and ADLs. Per guidelines, 15 hours per week between PT, OT and/or SLP.  Follow-up with PCP  Restrictions: No strenuous activity, heavy lifting, twisting for 1-2 weeks.  Do not lift anything greater than 7 pounds for 1-2 weeks.     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 Chew  Follow-up orthospine as an outpatient     Hyponatremia  Fluctuates greater than 130 3/4  3/7 stable at 133      Leukocytosis  Being treated for UTI  3/4, 3/7 resolved     Anemia  3/4 fluctuant from 10.6 up to 13.1 down to 11.9 today  3/7 resolved     Thrombocytosis  Resolved prior to admission  3/7 resolved     Hypertension  Continue amlodipine 5 mg daily --> 10 mg daily 3/4  Continue Avapro 300 mg daily  3/6 blood pressure elevated, amlodipine increased per hospitalist.  3/7 blood pressure still slightly elevated in the 140s and 150s but is otherwise normal.  Amlodipine recently increased.    Elevated alkaline phosphatase  Expected after bony trauma, improving 3/7    Hyperlipidemia  Continue rosuvastatin 10 mg nightly     Type 2 diabetes mellitus with hyperglycemia  Sliding scale insulin discontinued.  Continue metformin 500 mg daily     Pain  As needed Tylenol  As needed oxycodone  As needed ice  Scheduled Robaxin 500 mg 4 times daily--> decreased to 3 times daily  Scheduled lidocaine patch every 24 hours  Scheduled gabapentin 100 mg 3 times daily     Skin  Patient at risk for skin breakdown due to debility in areas including sacrum, achilles, elbows and head in addition to other sites. Nursing to assess skin daily.      GI Ppx  Patient on Prilosec for GERD prophylaxis.      Bowel   Constipation 3/7  Continue MiraLAX 1 packet twice daily PRN  3/7 additional dosages of MiraLAX ordered,  however, patient going to use restroom after interview.     Bladder  Pseudomonas UTI (onset prior to admission)  TV/PVR/BS PRN  3/4 Ciprofloxacin 500 mg twice daily for 5-day course through 3/5.         DVT PROPHYLAXIS: Lovenox 30 mg subcutaneously nightly (adjusted for weight)     HOSPITALIST FOLLOWING: Discussed with hospitalist 3/7    CODE STATUS: DNR/DNI     DISPO: Home with spouse     JAKE: 3/10/24     MEDS SENT TO: TBD     DISCHARGE SPECIALIST FOLLOW UP: Orthospine     Patient to scheduled follow up with their PCP within 2 weeks from discharge from the [facility].      ____________________________________    Dr. Wendy Pride DO, MS  [facility] - Physical Medicine & Rehabilitation   ____________________________________

## 2024-03-07 NOTE — THERAPY
Occupational Therapy  Daily Treatment     Patient Name: Mariana Jett  Age:  89 y.o., Sex:  female  Medical Record #: [id number]  Today's Date: 3/7/2024     Precautions  Precautions: Fall Risk, Spinal / Back Precautions          Subjective  Pt supine in bed upon arrival; requested to use bathroom. She endorsed that she feels well prepared for d/c.      Objective     03/07/24 1401   OT Charge Group   OT Self Care / ADL (Units) 2   OT Total Time Spent   OT Individual Total Time Spent (Mins) 30   Vitals   O2 Delivery Device None - Room Air   Functional Level of Assist   Lower Body Dressing Minimal Assist  (for shoe management due to spinal precautions and AE refusal; donned shoes EOB)   Toileting Supervision   Toileting Description Supervision for safety;Verbal cueing   Toilet Transfers Supervised   Toilet Transfer Description Verbal cues for spinal precautions;Supervision for safety   Bed Mobility    Supine to Sit Supervised   Interdisciplinary Plan of Care Collaboration   Patient Position at End of Therapy Seated;Call Light within Reach;Tray Table within Reach;Self Releasing Lap Belt Applied     Pt completed FWW level fxl mobility with verbal cues to increase base of support during ambulation to increase safety; sup-SBA. 121 ft x 2 with seated rest breaks between    Patient required verbal cues for spinal precautions during pant management with toileting    Assessment  Patient had good tolerance to session at FWW level and min seated rest breaks. She will benefit from supervision-standby assistance to ensure adherence to spinal precautions during ADLs.     Strengths: Able to follow instructions, Effective communication skills, Good insight into deficits/needs, Independent prior level of function, Motivated for self care and independence, Pleasant and cooperative, Willingly participates in therapeutic activities  Barriers: Generalized weakness, Impaired balance, Impaired activity tolerance, Pain, Decreased endurance, Bladder  incontinence    Plan  D/c [facility] Sat 3/9  Provide handout for walker tray with leg space for collab with daughter   ADLs with spinal precautions at FWW level   Standing tolerance/balance  Strengthening, endurance    DME       Passport items to be completed:  Perform bathroom transfers, complete dressing, complete feeding, get ready for the day, prepare a simple meal, participate in household tasks, adapt home for safety needs, demonstrate home exercise program, complete caregiver training     Occupational Therapy Goals (Active)       Problem: Dressing       Dates: Start:  03/02/24         Goal: STG-Within one week, patient will dress LB       Dates: Start:  03/02/24       Description: At a Min A level with AE/AD PRN and ongoing adherence to spinal precautions.            Problem: IADL's       Dates: Start:  03/02/24         Goal: STG-Within one week, patient will access kitchen area       Dates: Start:  03/02/24       Description: At a SUP level with LRD and seated RB as needed.             Problem: OT Long Term Goals       Dates: Start:  03/02/24         Goal: LTG-By discharge, patient will complete basic self care tasks       Dates: Start:  03/02/24       Description: At a Min A to Mod I level with AE/AD/DME PRN.          Goal: LTG-By discharge, patient will perform bathroom transfers       Dates: Start:  03/02/24       Description: At a SUP to Mod I level with AE/AD/DME PRN.             Problem: Toileting       Dates: Start:  03/02/24         Goal: STG-Within one week, patient will complete toileting tasks       Dates: Start:  03/02/24       Description: a Min A level with AE/AD PRN and ongoing adherence to spinal precautions.

## 2024-03-07 NOTE — PROGRESS NOTES
Hospital Medicine Daily Progress Note        Chief Complaint  Hypertension  Diabetes    Interval Problem Update  No complaints.  Doing ok.    Review of Systems  Review of Systems   Constitutional:  Negative for fever.   Eyes:  Negative for blurred vision.   Respiratory:  Negative for cough.    Cardiovascular:  Negative for chest pain.   Gastrointestinal:  Negative for diarrhea.   Musculoskeletal:  Negative for joint pain.   Neurological:  Negative for dizziness.   Psychiatric/Behavioral:  The patient is not nervous/anxious.         Physical Exam  Temp:  [36.4 °C (97.6 °F)-37 °C (98.6 °F)] 36.4 °C (97.6 °F)  Pulse:  [71-78] 75  Resp:  [16-18] 17  BP: (109-150)/(65-78) 134/78  SpO2:  [90 %-94 %] 92 %    Physical Exam  Vitals and nursing note reviewed.   Constitutional:       Appearance: She is not diaphoretic.   HENT:      Mouth/Throat:      Pharynx: No oropharyngeal exudate or posterior oropharyngeal erythema.   Eyes:      General: No scleral icterus.  Neck:      Vascular: No carotid bruit or JVD.   Cardiovascular:      Rate and Rhythm: Normal rate and regular rhythm.      Heart sounds: Normal heart sounds. No murmur heard.  Pulmonary:      Effort: Pulmonary effort is normal.      Breath sounds: Normal breath sounds. No stridor.   Abdominal:      General: Bowel sounds are normal.      Palpations: Abdomen is soft.   Musculoskeletal:      Right lower leg: No edema.      Left lower leg: No edema.   Skin:     General: Skin is warm and dry.      Findings: No rash.   Neurological:      Mental Status: She is alert and oriented to person, place, and time.   Psychiatric:         Mood and Affect: Mood normal.         Behavior: Behavior normal.         Fluids    Intake/Output Summary (Last 24 hours) at 3/7/2024 1000  Last data filed at 3/7/2024 0835  Gross per 24 hour   Intake 240 ml   Output --   Net 240 ml       Laboratory  Recent Labs     03/07/24  0554   WBC 6.9   RBC 4.28   HEMOGLOBIN 13.4   HEMATOCRIT 39.1   MCV 91.4    MCH 31.3   MCHC 34.3   RDW 42.5   PLATELETCT 358   MPV 8.0*     Recent Labs     03/07/24  0554   SODIUM 133*   POTASSIUM 4.2   CHLORIDE 98   CO2 22   GLUCOSE 136*   BUN 9   CREATININE 0.32*   CALCIUM 9.1                   Assessment/Plan  Lumbar compression fracture, closed, initial encounter ([facility])- (present on admission)  Assessment & Plan  2nd to GLF  S/P L2 kyphoplasty (2/29)    Dyslipidemia- (present on admission)  Assessment & Plan  Cont Crestor    Diabetes mellitus type II, controlled ([facility])- (present on admission)  Assessment & Plan  Hba1c: 6.4  Cont Metformin  Off accuchecks  Note: home meds include Metformin 500 mg daily usually, but pt reports she takes twice daily as needed if FSBS >150    Hypertension- (present on admission)  Assessment & Plan  BP better recently but a little labile  Cont Norvasc  Cont Avapro  Cont to monitor

## 2024-03-07 NOTE — THERAPY
Physical Therapy   Daily Treatment     Patient Name: Mariana Jett  Age:  89 y.o., Sex:  female  Medical Record #: [id number]  Today's Date: 3/7/2024     Precautions  Precautions: Fall Risk, Spinal / Back Precautions     Subjective    Pt up in wc, willing to participate.     Objective       03/07/24 0931   PT Charge Group   PT Gait Training (Units) 2   PT Therapeutic Exercise (Units) 1   PT Neuromuscular Re-Education / Balance (Units) 1   PT Total Time Spent   PT Individual Total Time Spent (Mins) 60   Gait Functional Level of Assist    Gait Level Of Assist Standby Assist   Assistive Device Front Wheel Walker   Distance (Feet) 210  (in addition to 110 ft x 2 with 4WW)   # of Times Distance was Traveled 2   Deviation Decreased Base Of Support;Bradykinetic   Transfer Functional Level of Assist   Bed, Chair, Wheelchair Transfer Standby Assist   Bed Chair Wheelchair Transfer Description Adaptive equipment;Set-up of equipment;Verbal cueing   Sitting Lower Body Exercises   Ankle Pumps 2 sets of 15   Hip Flexion 2 sets of 15   Hip Abduction 2 sets of 15   Hip Adduction 2 sets of 15   Long Arc Quad 2 sets of 15   Hamstring Curl 2 sets of 15   Comments 2# ankle wts/ green TB   Bed Mobility    Sit to Supine Supervised   Sit to Stand Supervised   Neuro-Muscular Treatments   Neuro-Muscular Treatments Sequencing;Tactile Cuing;Verbal Cuing;Weight Shift Right;Weight Shift Left   Comments standing balance/ wt shifting activity with UE support for lateral stepping/ wt shifting to target dots, 2 x 10 B.         Assessment    Pt improving gait endurance and maintaining improved LISETH with gait, demonstrated good use of 4WW but continue to recommend FWW for safety/ stability with standing/ ambulation at this time.   Strengths: Able to follow instructions, Alert and oriented, Manages pain appropriately, Motivated for self care and independence, Pleasant and cooperative, Supportive family, Willingly participates in therapeutic  activities  Barriers: Decreased endurance, Fatigue, Impaired activity tolerance, Impaired balance, Pain, Generalized weakness    Plan    Gait training / endurance with FWW, trial practice with 4WW, balance training, stair training, overall activity tolerance/endurance/ strength training.        DME       Passport items to be completed:  Get in/out of bed safely, in/out of a vehicle, safely use mobility device, walk or wheel around home/community, navigate up and down stairs, show how to get up/down from the ground, ensure home is accessible, demonstrate HEP, complete caregiver training    Physical Therapy Problems (Active)       Problem: Mobility       Dates: Start:  03/02/24         Goal: STG-Within one week, patient will ascend and descend four to six stairs with rails and CGA.        Dates: Start:  03/02/24               Problem: PT-Long Term Goals       Dates: Start:  03/02/24         Goal: LTG-By discharge, patient will ambulate at least 150 feet with LRAD and supervision.        Dates: Start:  03/02/24            Goal: LTG-By discharge, patient will transfer one surface to another with LRAD and supervision.        Dates: Start:  03/02/24            Goal: LTG-By discharge, patient will ambulate up/down 4-6 stairs with B rails and SBA.        Dates: Start:  03/02/24            Goal: LTG-By discharge, patient will transfer in/out of a car with LRAD and SBA.        Dates: Start:  03/02/24

## 2024-03-07 NOTE — THERAPY
Physical Therapy   Daily Treatment     Patient Name: Mariana Jett  Age:  89 y.o., Sex:  female  Medical Record #: [id number]  Today's Date: 3/6/2024     Precautions  Precautions: (P) Fall Risk, Spinal / Back Precautions     Subjective    Pt is in her bed and agreeable to participate in therapy.     Objective       03/06/24 1301   PT Charge Group   PT Neuromuscular Re-Education / Balance (Units) 1   PT Therapeutic Activities (Units) 1   PT Total Time Spent   PT Individual Total Time Spent (Mins) 30   Precautions   Precautions Fall Risk;Spinal / Back Precautions    Pain   Intervention Declines   Pain 0 - 10 Group   Location Back   Location Orientation Lower   Pain Rating Scale (NPRS) 2   Description Aching   Comfort Goal Comfort with Movement   Non Verbal Descriptors   Non Verbal Scale  Calm   Transfer Functional Level of Assist   Bed, Chair, Wheelchair Transfer Standby Assist   Bed Chair Wheelchair Transfer Description Supervision for safety;Adaptive equipment;Increased time   Bed Mobility    Supine to Sit Supervised   Sit to Supine Supervised   Sit to Stand Supervised   Scooting Modified Independent   Rolling Modified Independent   Neuro-Muscular Treatments   Neuro-Muscular Treatments Anterior weight shift;Postural Facilitation;Postural Changes;Verbal Cuing;Tactile Cuing;Sequencing   Comments Performed step up and over a 4 inch step in // bars and side steeping 10' with single hand support   Interdisciplinary Plan of Care Collaboration   Patient Position at End of Therapy In Bed;Bed Alarm On;Call Light within Reach;Tray Table within Reach;Phone within Reach         Assessment    She performed step up and over a 4 inch step in // bars with BH x 3 rds with cueing for ascending with good foot and descending with weak foot. She return demonstrated with accuracy and good recall. Pt demonstrated antalgic steps with single hand support in // bars during side stepping task. But no lob noted nor knee buckling.   Strengths: Able to  follow instructions, Alert and oriented, Manages pain appropriately, Motivated for self care and independence, Pleasant and cooperative, Supportive family, Willingly participates in therapeutic activities  Barriers: Decreased endurance, Fatigue, Impaired activity tolerance, Impaired balance, Pain, Generalized weakness    Plan    Gait training with FWW, balance training, stair training, overall activity tolerance/endurance/ strength training.          DME       Passport items to be completed:  Get in/out of bed safely, in/out of a vehicle, safely use mobility device, walk or wheel around home/community, navigate up and down stairs, show how to get up/down from the ground, ensure home is accessible, demonstrate HEP, complete caregiver training    Physical Therapy Problems (Active)       Problem: Mobility       Dates: Start:  03/02/24         Goal: STG-Within one week, patient will ascend and descend four to six stairs with rails and CGA.        Dates: Start:  03/02/24               Problem: PT-Long Term Goals       Dates: Start:  03/02/24         Goal: LTG-By discharge, patient will ambulate at least 150 feet with LRAD and supervision.        Dates: Start:  03/02/24            Goal: LTG-By discharge, patient will transfer one surface to another with LRAD and supervision.        Dates: Start:  03/02/24            Goal: LTG-By discharge, patient will ambulate up/down 4-6 stairs with B rails and SBA.        Dates: Start:  03/02/24            Goal: LTG-By discharge, patient will transfer in/out of a car with LRAD and SBA.        Dates: Start:  03/02/24

## 2024-03-07 NOTE — THERAPY
Occupational Therapy  Daily Treatment     Patient Name: Mariana Jett  Age:  89 y.o., Sex:  female  Medical Record #: [id number]  Today's Date: 3/7/2024     Precautions  Precautions: Fall Risk, Spinal / Back Precautions          Subjective  Pt supine in bed upon arrival, pleasant and agreeable to OT session.      Objective     03/07/24 0701   OT Charge Group   OT Self Care / ADL (Units) 2   OT Therapeutic Exercise (Units) 2   OT Total Time Spent   OT Individual Total Time Spent (Mins) 60   Vitals   O2 Delivery Device None - Room Air   Functional Level of Assist   Grooming Supervision;Standing  (FWW hand washing, teethbrushing, hair brushing)   Upper Body Dressing Stand by Assist  (set-up to don shirt in standing at FWW level with sup for standing balance)   Upper Body Dressing Description Set-up of equipment;Supervision for safety;Increased time   Lower Body Dressing Minimal Assist  (min A for shoes due to spinal precautions and AE refusal; set-up for pants with use of reacher)   Lower Body Dressing Description Reacher;Assistive devices;Set-up of equipment;Supervision for safety;Initial preparation for task;Increased time;Cues for spinal precautions   Toileting Supervision   Toileting Description Supervision for safety;Increased time;Grab bar   Bed, Chair, Wheelchair Transfer Standby Assist  (EOB > FWW)   Toilet Transfers Supervised  (FWW <> toilet)   Toilet Transfer Description Adaptive equipment;Increased time;Supervision for safety   Bed Mobility    Supine to Sit Minimal Assist  (with assist for trunk control)   Interdisciplinary Plan of Care Collaboration   Patient Position at End of Therapy Seated;Call Light within Reach;Tray Table within Reach     Pt completed all fxl mobility during session at FWW level with SBA-sup and intermittent seated rest breaks. She endorsed that she prefers her 4WW because it has a basket for her to hold things; pt oriented to walker attachments: baskets, pouches, platform trays. Pt trialed  "use of platform tray to support kitchen mobility, but the depth of the tray impacted proximity of body with walker and step length. Pt declined use of pouches and baskets, \"I have pockets for those\". Pt provided with handout for [employer] purchase of walker tray with ample leg space. Pt verbalized understanding of use of walker attachments, including best options for safety, pros, and cons of each.     Pt completed small obstacle course in // bars with various items to step over to encourage increased step length and base of support; x 6 length of // bars.     Assessment  Pt tolerated session well with focus on activities, exercises, and education to support functional mobility at FWW level. She completed ADLs at FWW level without use of GB.   Strengths: Able to follow instructions, Effective communication skills, Good insight into deficits/needs, Independent prior level of function, Motivated for self care and independence, Pleasant and cooperative, Willingly participates in therapeutic activities  Barriers: Generalized weakness, Impaired balance, Impaired activity tolerance, Pain, Decreased endurance, Bladder incontinence    Plan  D/c IRF-Richard Sat 3/9  Blocked STS with FWW  ADLs with spinal precautions at FWW level   Standing tolerance/balance  Strengthening, endurance      DME       Passport items to be completed:  Perform bathroom transfers, complete dressing, complete feeding, get ready for the day, prepare a simple meal, participate in household tasks, adapt home for safety needs, demonstrate home exercise program, complete caregiver training     Occupational Therapy Goals (Active)       Problem: Dressing       Dates: Start:  03/02/24         Goal: STG-Within one week, patient will dress LB       Dates: Start:  03/02/24       Description: At a Min A level with AE/AD PRN and ongoing adherence to spinal precautions.            Problem: IADL's       Dates: Start:  03/02/24         Goal: STG-Within one week, patient " will access kitchen area       Dates: Start:  03/02/24       Description: At a SUP level with LRD and seated RB as needed.             Problem: OT Long Term Goals       Dates: Start:  03/02/24         Goal: LTG-By discharge, patient will complete basic self care tasks       Dates: Start:  03/02/24       Description: At a Min A to Mod I level with AE/AD/DME PRN.          Goal: LTG-By discharge, patient will perform bathroom transfers       Dates: Start:  03/02/24       Description: At a SUP to Mod I level with AE/AD/DME PRN.             Problem: Toileting       Dates: Start:  03/02/24         Goal: STG-Within one week, patient will complete toileting tasks       Dates: Start:  03/02/24       Description: a Min A level with AE/AD PRN and ongoing adherence to spinal precautions.

## 2024-03-07 NOTE — CARE PLAN
Problem: Bowel Elimination  Goal: Patient will participate in bowel management program  Outcome: Progressing   Had large results after am dose of Miralax

## 2024-03-07 NOTE — DISCHARGE PLANNING
Case Management/IDT follow up.   IDT continues to recommend IRF level of care as patient continue to make progress with all therapies.   Projected dc date set for 3/10      DC needs: recommendations made for home health for PT/OT/SLP  Will need to establish w/ pcp.       TC to pt's dtr Adriana Bingham  providing update from IDT:  she is in process of making arrangements at another facility; considering [facility].  Provided info on [facility] for PCP; pt has fww/4ww; dtr in agreement w/ [facility]; no preference;     Dtrs address is [address] [place] [id number]    Plan:  dc 3/10.

## 2024-03-08 ENCOUNTER — APPOINTMENT (OUTPATIENT)
Dept: PHYSICAL THERAPY | Facility: REHABILITATION | Age: 89
DRG: 560 | End: 2024-03-08
Attending: PHYSICAL MEDICINE & REHABILITATION
Payer: MEDICARE

## 2024-03-08 ENCOUNTER — APPOINTMENT (OUTPATIENT)
Dept: OCCUPATIONAL THERAPY | Facility: REHABILITATION | Age: 89
DRG: 560 | End: 2024-03-08
Attending: PHYSICAL MEDICINE & REHABILITATION
Payer: MEDICARE

## 2024-03-08 PROCEDURE — 700111 HCHG RX REV CODE 636 W/ 250 OVERRIDE (IP): Performed by: PHYSICAL MEDICINE & REHABILITATION

## 2024-03-08 PROCEDURE — 770010 HCHG ROOM/CARE - REHAB SEMI PRIVAT*

## 2024-03-08 PROCEDURE — 700101 HCHG RX REV CODE 250: Performed by: PHYSICAL MEDICINE & REHABILITATION

## 2024-03-08 PROCEDURE — 97110 THERAPEUTIC EXERCISES: CPT | Mod: CO

## 2024-03-08 PROCEDURE — 97116 GAIT TRAINING THERAPY: CPT

## 2024-03-08 PROCEDURE — 700102 HCHG RX REV CODE 250 W/ 637 OVERRIDE(OP): Performed by: HOSPITALIST

## 2024-03-08 PROCEDURE — A9270 NON-COVERED ITEM OR SERVICE: HCPCS | Performed by: PHYSICAL MEDICINE & REHABILITATION

## 2024-03-08 PROCEDURE — 700102 HCHG RX REV CODE 250 W/ 637 OVERRIDE(OP): Performed by: PHYSICAL MEDICINE & REHABILITATION

## 2024-03-08 PROCEDURE — 97530 THERAPEUTIC ACTIVITIES: CPT | Mod: CO

## 2024-03-08 PROCEDURE — 97112 NEUROMUSCULAR REEDUCATION: CPT

## 2024-03-08 PROCEDURE — 99232 SBSQ HOSP IP/OBS MODERATE 35: CPT | Performed by: PHYSICAL MEDICINE & REHABILITATION

## 2024-03-08 PROCEDURE — 97110 THERAPEUTIC EXERCISES: CPT

## 2024-03-08 PROCEDURE — 99231 SBSQ HOSP IP/OBS SF/LOW 25: CPT | Performed by: HOSPITALIST

## 2024-03-08 PROCEDURE — 97530 THERAPEUTIC ACTIVITIES: CPT

## 2024-03-08 PROCEDURE — A9270 NON-COVERED ITEM OR SERVICE: HCPCS | Performed by: HOSPITALIST

## 2024-03-08 RX ADMIN — IRBESARTAN 300 MG: 150 TABLET ORAL at 06:14

## 2024-03-08 RX ADMIN — METHOCARBAMOL 500 MG: 500 TABLET ORAL at 14:50

## 2024-03-08 RX ADMIN — ENOXAPARIN SODIUM 30 MG: 100 INJECTION SUBCUTANEOUS at 17:38

## 2024-03-08 RX ADMIN — LIDOCAINE 1 PATCH: 4 PATCH TOPICAL at 06:14

## 2024-03-08 RX ADMIN — Medication 1000 UNITS: at 08:31

## 2024-03-08 RX ADMIN — GABAPENTIN 100 MG: 100 CAPSULE ORAL at 14:51

## 2024-03-08 RX ADMIN — METHOCARBAMOL 500 MG: 500 TABLET ORAL at 08:31

## 2024-03-08 RX ADMIN — OMEPRAZOLE 20 MG: 20 CAPSULE, DELAYED RELEASE ORAL at 08:32

## 2024-03-08 RX ADMIN — METHOCARBAMOL 500 MG: 500 TABLET ORAL at 20:02

## 2024-03-08 RX ADMIN — ACETAMINOPHEN 650 MG: 325 TABLET ORAL at 08:32

## 2024-03-08 RX ADMIN — GABAPENTIN 100 MG: 100 CAPSULE ORAL at 08:31

## 2024-03-08 RX ADMIN — ROSUVASTATIN CALCIUM 10 MG: 10 TABLET, COATED ORAL at 08:31

## 2024-03-08 RX ADMIN — AMLODIPINE BESYLATE 10 MG: 5 TABLET ORAL at 06:15

## 2024-03-08 RX ADMIN — METFORMIN HYDROCHLORIDE 500 MG: 500 TABLET ORAL at 08:32

## 2024-03-08 RX ADMIN — GABAPENTIN 100 MG: 100 CAPSULE ORAL at 20:02

## 2024-03-08 ASSESSMENT — GAIT ASSESSMENTS
ASSISTIVE DEVICE: 4 WHEEL WALKER
DEVIATION: DECREASED BASE OF SUPPORT;BRADYKINETIC
GAIT LEVEL OF ASSIST: STANDBY ASSIST
GAIT LEVEL OF ASSIST: STANDBY ASSIST
DISTANCE (FEET): 200
ASSISTIVE DEVICE: FRONT WHEEL WALKER
DISTANCE (FEET): 60

## 2024-03-08 ASSESSMENT — ENCOUNTER SYMPTOMS
CHILLS: 0
NERVOUS/ANXIOUS: 0
SHORTNESS OF BREATH: 0
ABDOMINAL PAIN: 0
DIARRHEA: 0
VOMITING: 0
FEVER: 0
NAUSEA: 0

## 2024-03-08 ASSESSMENT — ACTIVITIES OF DAILY LIVING (ADL)
BED_CHAIR_WHEELCHAIR_TRANSFER_DESCRIPTION: SUPERVISION FOR SAFETY;SQUAT PIVOT TRANSFER TO WHEELCHAIR
BED_CHAIR_WHEELCHAIR_TRANSFER_DESCRIPTION: ADAPTIVE EQUIPMENT;INCREASED TIME
BED_CHAIR_WHEELCHAIR_TRANSFER_DESCRIPTION: ADAPTIVE EQUIPMENT;INCREASED TIME;SET-UP OF EQUIPMENT
BED_CHAIR_WHEELCHAIR_TRANSFER_DESCRIPTION: ADAPTIVE EQUIPMENT;INCREASED TIME

## 2024-03-08 ASSESSMENT — PAIN DESCRIPTION - PAIN TYPE
TYPE: ACUTE PAIN
TYPE: ACUTE PAIN

## 2024-03-08 NOTE — THERAPY
Physical Therapy   Daily Treatment     Patient Name: Mariana Jett  Age:  89 y.o., Sex:  female  Medical Record #: [id number]  Today's Date: 3/8/2024     Precautions  Precautions: Fall Risk, Spinal / Back Precautions     Subjective    Pt up in wc, willing to participate.     Objective       03/08/24 0831   PT Charge Group   PT Gait Training (Units) 1   PT Therapeutic Exercise (Units) 2   PT Therapeutic Activities (Units) 1   PT Total Time Spent   PT Individual Total Time Spent (Mins) 60   Gait Functional Level of Assist    Gait Level Of Assist Standby Assist   Assistive Device Front Wheel Walker   Distance (Feet) 200   # of Times Distance was Traveled 2   Deviation Decreased Base Of Support;Bradykinetic   Transfer Functional Level of Assist   Bed, Chair, Wheelchair Transfer Standby Assist   Bed Chair Wheelchair Transfer Description Adaptive equipment;Increased time;Set-up of equipment   Sitting Lower Body Exercises   Ankle Pumps 1 set of 15   Hip Flexion 1 set of 15   Hip Abduction 1 set of 15   Hip Adduction 1 set of 15   Long Arc Quad 1 set of 15   Hamstring Curl 1 set of 15   Comments 2# ankle wts/ green TB   Standing Lower Body Exercises   Hamstring Curl 1 set of 10   Hip Flexion 1 set of 10   Hip Extension 1 set of 10   Hip Abduction 1 set of 10   Marching 1 set of 10   Heel Rise 1 set of 10   Toe Rise 1 set of 10   Mini Squat 1 set of 10   Bed Mobility    Supine to Sit Supervised   Sit to Supine Supervised   Sit to Stand Supervised   Scooting Modified Independent   Rolling Modified Independent   Neuro-Muscular Treatments   Neuro-Muscular Treatments Anterior weight shift;Weight Shift Right;Weight Shift Left   Comments walking through small hurdles/ side stepping through hurdles with cues for increased step length/ foot clearance and abduction with side step. UE support at // bars     Pt completed car transfer with FWW and platform step with SBA and cues for sequencing/ safety.     Assessment    Pt demonstrated  improved base of support initially walking to therapy gym but reverts to narrow base of support with fatigue when ambulating back to room following therapy session. Pt able to complete seated and standing HEP following handouts and verbal instruction.     Strengths: Able to follow instructions, Alert and oriented, Manages pain appropriately, Motivated for self care and independence, Pleasant and cooperative, Supportive family, Willingly participates in therapeutic activities  Barriers: Decreased endurance, Fatigue, Impaired activity tolerance, Impaired balance, Pain, Generalized weakness    Plan    Gait training / endurance with FWW, trial practice with 4WW, balance training, stair training, overall activity tolerance/endurance/ strength training. Complete d/c IRF-MICHAEL for discharge Venkat 3/10    DME       Passport items to be completed:  Get in/out of bed safely, in/out of a vehicle, safely use mobility device, walk or wheel around home/community, navigate up and down stairs, show how to get up/down from the ground, ensure home is accessible, demonstrate HEP, complete caregiver training    Physical Therapy Problems (Active)       Problem: Mobility       Dates: Start:  03/02/24         Goal: STG-Within one week, patient will ascend and descend four to six stairs with rails and CGA.        Dates: Start:  03/02/24               Problem: PT-Long Term Goals       Dates: Start:  03/02/24         Goal: LTG-By discharge, patient will ambulate at least 150 feet with LRAD and supervision.        Dates: Start:  03/02/24            Goal: LTG-By discharge, patient will transfer one surface to another with LRAD and supervision.        Dates: Start:  03/02/24            Goal: LTG-By discharge, patient will ambulate up/down 4-6 stairs with B rails and SBA.        Dates: Start:  03/02/24            Goal: LTG-By discharge, patient will transfer in/out of a car with LRAD and SBA.        Dates: Start:  03/02/24

## 2024-03-08 NOTE — THERAPY
"Physical Therapy   Daily Treatment     Patient Name: Mariana Jett  Age:  89 y.o., Sex:  female  Medical Record #: [id number]  Today's Date: 3/8/2024     Precautions  Precautions: Fall Risk, Spinal / Back Precautions     Subjective    Pt in w/c at arrival, agreeable to PT tx. \"I've done just about everything.\"      Objective       03/08/24 1301   PT Charge Group   PT Neuromuscular Re-Education / Balance (Units) 2   PT Total Time Spent   PT Individual Total Time Spent (Mins) 30   Gait Functional Level of Assist    Gait Level Of Assist Standby Assist   Assistive Device 4 Wheel Walker   Distance (Feet) 60   # of Times Distance was Traveled 1   Transfer Functional Level of Assist   Bed, Chair, Wheelchair Transfer Standby Assist   Bed Chair Wheelchair Transfer Description Supervision for safety;Squat pivot transfer to wheelchair   Sitting Lower Body Exercises   Sitting Lower Body Exercises Yes   Long Arc Quad   (1 x 5 each)   Marching   (1 x 5 alt)   Sit to Stand   (1 x 5 from EOM, come to stand without UE support)   Bed Mobility    Sit to Supine Supervised   Sit to Stand Standby Assist   Scooting Modified Independent   Rolling Modified Independent   Neuro-Muscular Treatments   Neuro-Muscular Treatments Anterior weight shift;Weight Shift Right;Weight Shift Left;Postural Changes;Postural Facilitation;Verbal Cuing;Tactile Cuing;Sequencing;Co-Contraction   Comments Pregait: c/ 4WW for incidental UE support; standing c/ narrow LISETH, horizontal head turns x 5 each; STS + A-P stepping x 3-4 steps each direction x 6 reps; modified to step to targets to cue reciprocal step pattern; pt unable to complete reciprocal step without UE support; modified to march in place alt x 8, then unilateral A-P step to targets 1 x 5 each then alt 1 x 8 ; seated rests between activities.   Interdisciplinary Plan of Care Collaboration   IDT Collaboration with  Physical Therapist;Other (See Comments)  (SPT)   Patient Position at End of Therapy In Bed;Bed " Alarm On;Call Light within Reach;Tray Table within Reach;Phone within Reach   Collaboration Comments CLOF, POC   Physical Therapist Assigned   Assigned PT / Treatment Time / Comments brian 60+30     Session focused on anticipatory postural control and stepping response as above.   4WW trial on level surfaces + doorways in gym and back to pt room.   Education: postural control and role of weightshift/postural mm in taking an effective step, rationale for interventions to improve balance.     Assessment    Pt needing increased time and encouragement to attempt stepping without UE support, with poor weightshift and hip stability in L stance. Good potential for improvement of balance with high rep practice. Reports comfort with 4WW and good overall management during transfers and turning.     Strengths: Able to follow instructions, Alert and oriented, Manages pain appropriately, Motivated for self care and independence, Pleasant and cooperative, Supportive family, Willingly participates in therapeutic activities  Barriers: Decreased endurance, Fatigue, Impaired activity tolerance, Impaired balance, Pain, Generalized weakness    Plan    Gait training / endurance with FWW, continue trial practice with 4WW, balance training, stair training, overall activity tolerance/endurance/ strength training. Complete d/c IRF-MICHAEL for discharge Venkat 3/10       DME  Passport items to be completed:  Get in/out of bed safely, in/out of a vehicle, safely use mobility device, walk or wheel around home/community, navigate up and down stairs, show how to get up/down from the ground, ensure home is accessible, demonstrate HEP, complete caregiver training    Physical Therapy Problems (Active)       Problem: Mobility       Dates: Start:  03/02/24         Goal: STG-Within one week, patient will ascend and descend four to six stairs with rails and CGA.        Dates: Start:  03/02/24               Problem: PT-Long Term Goals       Dates: Start:   03/02/24         Goal: LTG-By discharge, patient will ambulate at least 150 feet with LRAD and supervision.        Dates: Start:  03/02/24            Goal: LTG-By discharge, patient will transfer one surface to another with LRAD and supervision.        Dates: Start:  03/02/24            Goal: LTG-By discharge, patient will ambulate up/down 4-6 stairs with B rails and SBA.        Dates: Start:  03/02/24            Goal: LTG-By discharge, patient will transfer in/out of a car with LRAD and SBA.        Dates: Start:  03/02/24

## 2024-03-08 NOTE — THERAPY
Occupational Therapy  Daily Treatment     Patient Name: Mariana Jett  Age:  89 y.o., Sex:  female  Medical Record #: [id number]  Today's Date: 3/8/2024     Precautions  Precautions: Fall Risk, Spinal / Back Precautions          Subjective    Pt was supine awake upon arrival and agreeable for OT session.      Objective       03/08/24 1101   OT Charge Group   OT Therapy Activity (Units) 2   OT Total Time Spent   OT Individual Total Time Spent (Mins) 30   Functional Level of Assist   Bed, Chair, Wheelchair Transfer Standby Assist   Bed Chair Wheelchair Transfer Description Adaptive equipment;Increased time   IADL Treatments   IADL Treatments Other   Comments Mock grocery shopping in back gym. See note   Bed Mobility    Supine to Sit Supervised   Scooting Modified Independent   Rolling Modified Independent   Interdisciplinary Plan of Care Collaboration   Patient Position at End of Therapy Seated;Chair Alarm On;Call Light within Reach;Tray Table within Reach;Phone within Reach     IADLs: Pt was able to complete STS at SBA/SPV w/ one hand on w/c and other on shopping cart in prep for mock grocery shopping. Instructed pt in visual scanning around back gym to gather 8 items placed on various heights and placing them into shopping cart. Instructed to place items back onto shelve after rest breaks, however needed VC to adhere to spinal precautions during activity.     Assessment    Pt tolerated session well w/ focus on IADls. Pt c/o slight discomfort in back after functional mob, however was tolerable after adequate rest break.   Strengths: Able to follow instructions, Effective communication skills, Good insight into deficits/needs, Independent prior level of function, Motivated for self care and independence, Pleasant and cooperative, Willingly participates in therapeutic activities  Barriers: Generalized weakness, Impaired balance, Impaired activity tolerance, Pain, Decreased endurance, Bladder incontinence    Plan    D/c  IRF-Richard Sat 3/9  Provide handout for walker tray with leg space for collab with daughter   ADLs with spinal precautions at FWW level   Standing tolerance/balance  Strengthening, endurance    DME       Passport items to be completed:  Perform bathroom transfers, complete dressing, complete feeding, get ready for the day, prepare a simple meal, participate in household tasks, adapt home for safety needs, demonstrate home exercise program, complete caregiver training     Occupational Therapy Goals (Active)       Problem: Dressing       Dates: Start:  03/02/24         Goal: STG-Within one week, patient will dress LB       Dates: Start:  03/02/24       Description: At a Min A level with AE/AD PRN and ongoing adherence to spinal precautions.            Problem: IADL's       Dates: Start:  03/02/24         Goal: STG-Within one week, patient will access kitchen area       Dates: Start:  03/02/24       Description: At a SUP level with LRD and seated RB as needed.             Problem: OT Long Term Goals       Dates: Start:  03/02/24         Goal: LTG-By discharge, patient will complete basic self care tasks       Dates: Start:  03/02/24       Description: At a Min A to Mod I level with AE/AD/DME PRN.          Goal: LTG-By discharge, patient will perform bathroom transfers       Dates: Start:  03/02/24       Description: At a SUP to Mod I level with AE/AD/DME PRN.             Problem: Toileting       Dates: Start:  03/02/24         Goal: STG-Within one week, patient will complete toileting tasks       Dates: Start:  03/02/24       Description: a Min A level with AE/AD PRN and ongoing adherence to spinal precautions.

## 2024-03-08 NOTE — CARE PLAN
"  Problem: Fall Risk - Rehab  Goal: Patient will remain free from falls  Outcome: Progressing  Note: Hanh Brennan Fall risk Assessment Score: 17    High fall risk Interventions   - Bed and strip alarm   - Yellow sign by the door   - Yellow wrist band \"Fall risk\"  - Room near to the nurse station  - Do not leave patient unattended in the bathroom  - Fall risk education provided      Problem: Pain - Standard  Goal: Alleviation of pain or a reduction in pain to the patient’s comfort goal  Outcome: Progressing  Note: Assessed for pain and discomfort , medicated with oxycodone 5 mg for back discomfort with relief [ see relief] .Needs anticipated and attended.   The patient is Stable - Low risk of patient condition declining or worsening    Shift Goals  Clinical Goals: safety  Patient Goals: safety    Progress made toward(s) clinical / shift goals:  Pt free from fall and injury.    Patient is not progressing towards the following goals:      "

## 2024-03-08 NOTE — THERAPY
Physical Therapy   Daily Treatment     Patient Name: Mariana Jett  Age:  89 y.o., Sex:  female  Medical Record #: [id number]  Today's Date: 3/7/2024     Precautions  Precautions: Fall Risk, Spinal / Back Precautions     Subjective    Pt up in wc, willing to participate.     Objective       03/07/24 1501   PT Charge Group   PT Therapeutic Exercise (Units) 2   PT Total Time Spent   PT Individual Total Time Spent (Mins) 30   Standing Lower Body Exercises   Hamstring Curl 2 sets of 10   Hip Flexion 2 sets of 10   Hip Extension 2 sets of 10   Hip Abduction 2 sets of 10   Marching 2 sets of 10   Heel Rise 2 sets of 10   Toe Rise 2 sets of 10   Mini Squat Partial   Bed Mobility    Sit to Supine Supervised   Sit to Stand Supervised   Scooting Modified Independent   Rolling Modified Independent   Neuro-Muscular Treatments   Neuro-Muscular Treatments Anterior weight shift;Sequencing;Verbal Cuing   Comments forward/ backward walking with UE support at // bars and 1.5# wts, cues for equal stride length / equal steps forward/backward x 3 lengths         Assessment    Pt remains motivated throughout therapy sessions, improving base of support with all standing activity this pm.    Strengths: Able to follow instructions, Alert and oriented, Manages pain appropriately, Motivated for self care and independence, Pleasant and cooperative, Supportive family, Willingly participates in therapeutic activities  Barriers: Decreased endurance, Fatigue, Impaired activity tolerance, Impaired balance, Pain, Generalized weakness    Plan    Gait training / endurance with FWW, trial practice with 4WW, balance training, stair training, overall activity tolerance/endurance/ strength training.     DME       Passport items to be completed:  Get in/out of bed safely, in/out of a vehicle, safely use mobility device, walk or wheel around home/community, navigate up and down stairs, show how to get up/down from the ground, ensure home is accessible, demonstrate  HEP, complete caregiver training    Physical Therapy Problems (Active)       Problem: Mobility       Dates: Start:  03/02/24         Goal: STG-Within one week, patient will ascend and descend four to six stairs with rails and CGA.        Dates: Start:  03/02/24               Problem: PT-Long Term Goals       Dates: Start:  03/02/24         Goal: LTG-By discharge, patient will ambulate at least 150 feet with LRAD and supervision.        Dates: Start:  03/02/24            Goal: LTG-By discharge, patient will transfer one surface to another with LRAD and supervision.        Dates: Start:  03/02/24            Goal: LTG-By discharge, patient will ambulate up/down 4-6 stairs with B rails and SBA.        Dates: Start:  03/02/24            Goal: LTG-By discharge, patient will transfer in/out of a car with LRAD and SBA.        Dates: Start:  03/02/24

## 2024-03-08 NOTE — PROGRESS NOTES
Physical Medicine & Rehabilitation Progress Note    Encounter Date: 3/8/2024    Chief Complaint: Back pain    Interval Events (Subjective):  Vital signs: Labile but within normal limits for the most part.  Bowel movement 3/7  Voiding volitionally    Patient seen and examined in her room.  States that she is doing okay but is still having some back pain.  Has not had any issues completing therapy.  She is wondering if she can get a PCP.  No other concerns at this time.    ROS: 14 point ROS negative unless otherwise specified in the HPI    Objective:  VITAL SIGNS: BP 99/63   Pulse 61   Temp 36.7 °C (98 °F) (Oral)   Resp 18   Wt 47.9 kg (105 lb 9.6 oz)   SpO2 93%   BMI 17.18 kg/m²     GEN: No apparent distress  HEENT: Head normocephalic, atraumatic.  Sclera nonicteric bilaterally, no ocular discharge appreciated bilaterally.  CV: Extremities warm and well-perfused, no peripheral edema appreciated bilaterally.  SKIN: No appreciable skin breakdown on exposed areas of skin.  PSYCH: Mood and affect within normal limits.  NEURO: Awake alert.  Conversational.  Logical thought content.      Laboratory Values:  Recent Results (from the past 72 hour(s))   CBC WITH DIFFERENTIAL    Collection Time: 03/07/24  5:54 AM   Result Value Ref Range    WBC 6.9 4.8 - 10.8 K/uL    RBC 4.28 4.20 - 5.40 M/uL    Hemoglobin 13.4 12.0 - 16.0 g/dL    Hematocrit 39.1 37.0 - 47.0 %    MCV 91.4 81.4 - 97.8 fL    MCH 31.3 27.0 - 33.0 pg    MCHC 34.3 32.2 - 35.5 g/dL    RDW 42.5 35.9 - 50.0 fL    Platelet Count 358 164 - 446 K/uL    MPV 8.0 (L) 9.0 - 12.9 fL    Neutrophils-Polys 60.70 44.00 - 72.00 %    Lymphocytes 19.70 (L) 22.00 - 41.00 %    Monocytes 15.30 (H) 0.00 - 13.40 %    Eosinophils 2.60 0.00 - 6.90 %    Basophils 0.70 0.00 - 1.80 %    Immature Granulocytes 1.00 (H) 0.00 - 0.90 %    Nucleated RBC 0.00 0.00 - 0.20 /100 WBC    Neutrophils (Absolute) 4.16 1.82 - 7.42 K/uL    Lymphs (Absolute) 1.35 1.00 - 4.80 K/uL    Monos (Absolute)  1.05 (H) 0.00 - 0.85 K/uL    Eos (Absolute) 0.18 0.00 - 0.51 K/uL    Baso (Absolute) 0.05 0.00 - 0.12 K/uL    Immature Granulocytes (abs) 0.07 0.00 - 0.11 K/uL    NRBC (Absolute) 0.00 K/uL   Comp Metabolic Panel    Collection Time: 03/07/24  5:54 AM   Result Value Ref Range    Sodium 133 (L) 135 - 145 mmol/L    Potassium 4.2 3.6 - 5.5 mmol/L    Chloride 98 96 - 112 mmol/L    Co2 22 20 - 33 mmol/L    Anion Gap 13.0 7.0 - 16.0    Glucose 136 (H) 65 - 99 mg/dL    Bun 9 8 - 22 mg/dL    Creatinine 0.32 (L) 0.50 - 1.40 mg/dL    Calcium 9.1 8.5 - 10.5 mg/dL    Correct Calcium 9.1 8.5 - 10.5 mg/dL    AST(SGOT) 19 12 - 45 U/L    ALT(SGPT) 14 2 - 50 U/L    Alkaline Phosphatase 133 (H) 30 - 99 U/L    Total Bilirubin 0.6 0.1 - 1.5 mg/dL    Albumin 4.0 3.2 - 4.9 g/dL    Total Protein 6.5 6.0 - 8.2 g/dL    Globulin 2.5 1.9 - 3.5 g/dL    A-G Ratio 1.6 g/dL   ESTIMATED GFR    Collection Time: 03/07/24  5:54 AM   Result Value Ref Range    GFR (CKD-EPI) 100 >60 mL/min/1.73 m 2       Medications:  Scheduled Medications   Medication Dose Frequency    methocarbamol  500 mg TID    senna-docusate  2 Tablet Q EVENING    amLODIPine  10 mg DAILY    Pharmacy Consult Request  1 Each PHARMACY TO DOSE    omeprazole  20 mg DAILY    enoxaparin (LOVENOX) injection  30 mg DAILY AT 1800    gabapentin  100 mg TID    irbesartan  300 mg DAILY    lidocaine  1 Patch Q24HR    metFORMIN  500 mg DAILY    rosuvastatin  10 mg DAILY    vitamin D3  1,000 Units DAILY     PRN medications: Respiratory Therapy Consult, hydrALAZINE, carboxymethylcellulose, benzocaine-menthol, mag hydrox-al hydrox-simeth, ondansetron **OR** ondansetron, traZODone, sodium chloride, acetaminophen, oxyCODONE immediate-release **OR** oxyCODONE immediate-release, polyethylene glycol/lytes    Diet:  Current Diet Order   Procedures    Diet Order Diet: Consistent CHO (Diabetic)       Medical Decision Making and Plan:  L2 compression fracture secondary to fall at skilled nursing 2/27/2024  S/p L2  Cementoplasty 2/29/2024 Dr. Sawyer  PT and OT for mobility and ADLs. Per guidelines, 15 hours per week between PT, OT and/or SLP.  Follow-up with PCP  Restrictions: No strenuous activity, heavy lifting, twisting for 1-2 weeks.  Do not lift anything greater than 7 pounds for 1-2 weeks.     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 Chew  Follow-up orthospine as an outpatient     Hyponatremia  Fluctuates greater than 130 3/4  3/7 stable at 133      Leukocytosis  Being treated for UTI  3/4, 3/7 resolved     Anemia  3/4 fluctuant from 10.6 up to 13.1 down to 11.9 today  3/7 resolved     Thrombocytosis  Resolved prior to admission  3/7 resolved     Hypertension  Continue amlodipine 5 mg daily --> 10 mg daily 3/4  Continue Avapro 300 mg daily  3/6 blood pressure elevated, amlodipine increased per hospitalist.  3/8 blood pressure slightly labile blood with blood pressure within normal limits.    Elevated alkaline phosphatase  Expected after bony trauma, improving 3/7    Hyperlipidemia  Continue rosuvastatin 10 mg nightly     Type 2 diabetes mellitus with hyperglycemia  Sliding scale insulin discontinued.  Continue metformin 500 mg daily     Pain  As needed Tylenol  As needed oxycodone  As needed ice  Scheduled Robaxin 500 mg 4 times daily--> decreased to 3 times daily  Scheduled lidocaine patch every 24 hours  Scheduled gabapentin 100 mg 3 times daily     Skin  Patient at risk for skin breakdown due to debility in areas including sacrum, achilles, elbows and head in addition to other sites. Nursing to assess skin daily.      GI Ppx  Patient on Prilosec for GERD prophylaxis.      Bowel   Constipation 3/7  Continue MiraLAX 1 packet twice daily PRN  3/7 additional dosages of MiraLAX ordered, however, patient going to use restroom after interview.  3/8 had large bowel movement 3/7.     Bladder  Pseudomonas UTI (onset prior to  admission)  TV/PVR/BS PRN  3/4 Ciprofloxacin 500 mg twice daily for 5-day course through 3/5.         DVT PROPHYLAXIS: Lovenox 30 mg subcutaneously nightly (adjusted for weight)     HOSPITALIST FOLLOWING: Discussed with hospitalist 3/8    CODE STATUS: DNR/DNI     DISPO: Home with spouse     JAKE: 3/10/24     MEDS SENT TO: [facility] [address]     DISCHARGE SPECIALIST FOLLOW UP: Orthospine     Patient to scheduled follow up with their PCP within 2 weeks from discharge from the [facility].      ____________________________________    Dr. Wendy Pride DO, MS  [facility] - Physical Medicine & Rehabilitation   ____________________________________

## 2024-03-08 NOTE — PROGRESS NOTES
Hospital Medicine Daily Progress Note        Chief Complaint  Hypertension  Diabetes    Interval Problem Update  No complaints.  Doing ok.    Review of Systems  Review of Systems   Constitutional:  Negative for chills and fever.   Respiratory:  Negative for shortness of breath.    Cardiovascular:  Negative for chest pain.   Gastrointestinal:  Negative for abdominal pain, diarrhea, nausea and vomiting.   Psychiatric/Behavioral:  The patient is not nervous/anxious.         Physical Exam  Temp:  [36.2 °C (97.2 °F)-36.7 °C (98 °F)] 36.7 °C (98 °F)  Pulse:  [61-92] 61  Resp:  [15-20] 18  BP: ()/(60-71) 99/63  SpO2:  [92 %-95 %] 93 %    Physical Exam  Vitals and nursing note reviewed.   Constitutional:       Appearance: Normal appearance.   HENT:      Head: Atraumatic.   Eyes:      Conjunctiva/sclera: Conjunctivae normal.      Pupils: Pupils are equal, round, and reactive to light.   Neck:      Vascular: No JVD.   Cardiovascular:      Rate and Rhythm: Normal rate and regular rhythm.      Heart sounds: Normal heart sounds.   Pulmonary:      Effort: Pulmonary effort is normal.      Breath sounds: Normal breath sounds.   Abdominal:      General: Bowel sounds are normal.      Palpations: Abdomen is soft.   Musculoskeletal:      Right lower leg: No edema.      Left lower leg: No edema.   Skin:     General: Skin is warm and dry.   Neurological:      Mental Status: She is alert and oriented to person, place, and time.   Psychiatric:         Mood and Affect: Mood normal.         Behavior: Behavior normal.         Fluids    Intake/Output Summary (Last 24 hours) at 3/8/2024 1033  Last data filed at 3/7/2024 1900  Gross per 24 hour   Intake 440 ml   Output --   Net 440 ml       Laboratory  Recent Labs     03/07/24  0554   WBC 6.9   RBC 4.28   HEMOGLOBIN 13.4   HEMATOCRIT 39.1   MCV 91.4   MCH 31.3   MCHC 34.3   RDW 42.5   PLATELETCT 358   MPV 8.0*     Recent Labs     03/07/24  0554   SODIUM 133*   POTASSIUM 4.2   CHLORIDE 98    CO2 22   GLUCOSE 136*   BUN 9   CREATININE 0.32*   CALCIUM 9.1                   Assessment/Plan  Lumbar compression fracture, closed, initial encounter ([facility])- (present on admission)  Assessment & Plan  2nd to GLF  S/P L2 kyphoplasty (2/29)    Dyslipidemia- (present on admission)  Assessment & Plan  Cont Crestor    Diabetes mellitus type II, controlled ([facility])- (present on admission)  Assessment & Plan  Hba1c: 6.4  Cont Metformin  Off accuchecks  Note: home meds include Metformin 500 mg daily usually, but pt reports she takes twice daily as needed if FSBS >150    Hypertension- (present on admission)  Assessment & Plan  BP better recently but a little labile  Cont Norvasc  Cont Avapro  Cont to monitor

## 2024-03-08 NOTE — THERAPY
Occupational Therapy  Daily Treatment     Patient Name: Mariana Jett  Age:  89 y.o., Sex:  female  Medical Record #: [id number]  Today's Date: 3/8/2024     Precautions  Precautions: Fall Risk, Spinal / Back Precautions          Subjective    Pt was sleeping upon arrival, however was easily aroused and agreeable for OT session      Objective       03/08/24 0700   OT Charge Group   OT Therapy Activity (Units) 2   OT Therapeutic Exercise (Units) 2   OT Total Time Spent   OT Individual Total Time Spent (Mins) 60   Functional Level of Assist   Upper Body Dressing Supervision   Lower Body Dressing Contact Guard Assist   Lower Body Dressing Description Reacher;Cues for spinal precautions;Increased time;Verbal cueing;Set-up of equipment   Bed, Chair, Wheelchair Transfer Standby Assist   Bed Chair Wheelchair Transfer Description Adaptive equipment;Increased time   Sitting Upper Body Exercises   Internal Shoulder Rotation 3 sets of 10;Bilateral;Weight (See Comments for lbs)  (2lbs)   External Shoulder Rotation 3 sets of 10;Bilateral;Weight (See Comments for lbs)  (2lbs)   Bicep Curls 3 sets of 10;Bilateral;Weight (See Comments for lbs)  (2lbs)   Other Exercise Nustep ~10min level 2   Balance   Comments Pt completed balance activity (ladder ball) at // bars. See note   Bed Mobility    Supine to Sit Supervised   Scooting Modified Independent   Rolling Modified Independent   Interdisciplinary Plan of Care Collaboration   Patient Position at End of Therapy Seated;Chair Alarm On;Call Light within Reach;Tray Table within Reach;Phone within Reach     Pt was able to complete STS at [facility] using FWW w/ stand pivot transfer from w/c<>nustep w/out any rest breaks.   Thera Act: Pt completed balance activity by playing ladder ball x4 w/ seated rest breaks in between to challenge standing balance for functional tasks while adhering to spinal precautions. Ladder ball was placed approx. 5feet away w/ instruction on one hand UB support 1x than  progress to no UB support 1x, on air max pad w/ UB support 2x. Attempted no support w/ air max, however pt stated that she was feeling a little unsafe and requested to hold on for the remainder 5.       Assessment    Pt tolerated session well w/ focus on ADLs, thera ex, and standing balance to increase Ind for all functional tasks.   Strengths: Able to follow instructions, Effective communication skills, Good insight into deficits/needs, Independent prior level of function, Motivated for self care and independence, Pleasant and cooperative, Willingly participates in therapeutic activities  Barriers: Generalized weakness, Impaired balance, Impaired activity tolerance, Pain, Decreased endurance, Bladder incontinence    Plan    D/c IRF-Richard Sat 3/9  Provide handout for walker tray with leg space for collab with daughter   ADLs with spinal precautions at FWW level   Standing tolerance/balance  Strengthening, endurance    DME       Passport items to be completed:  Perform bathroom transfers, complete dressing, complete feeding, get ready for the day, prepare a simple meal, participate in household tasks, adapt home for safety needs, demonstrate home exercise program, complete caregiver training     Occupational Therapy Goals (Active)       Problem: Dressing       Dates: Start:  03/02/24         Goal: STG-Within one week, patient will dress LB       Dates: Start:  03/02/24       Description: At a Min A level with AE/AD PRN and ongoing adherence to spinal precautions.            Problem: IADL's       Dates: Start:  03/02/24         Goal: STG-Within one week, patient will access kitchen area       Dates: Start:  03/02/24       Description: At a SUP level with LRD and seated RB as needed.             Problem: OT Long Term Goals       Dates: Start:  03/02/24         Goal: LTG-By discharge, patient will complete basic self care tasks       Dates: Start:  03/02/24       Description: At a Min A to Mod I level with AE/AD/DME PRN.           Goal: LTG-By discharge, patient will perform bathroom transfers       Dates: Start:  03/02/24       Description: At a SUP to Mod I level with AE/AD/DME PRN.             Problem: Toileting       Dates: Start:  03/02/24         Goal: STG-Within one week, patient will complete toileting tasks       Dates: Start:  03/02/24       Description: a Min A level with AE/AD PRN and ongoing adherence to spinal precautions.

## 2024-03-08 NOTE — PROGRESS NOTES
NURSING DAILY NOTE    Name: Mariana Jett   Date of Admission: 3/1/2024   Admitting Diagnosis: Closed compression fracture of L2 lumbar vertebra, initial encounter ([facility])  Attending Physician: Wendy Pride D.o.  Allergies: Patient has no known allergies.    Safety  Patient Assist  Min assist  Patient Precautions  Fall Risk, Spinal / Back Precautions   Precaution Comments     Bed Transfer Status  Standby Assist  Toilet Transfer Status   Supervised  Assistive Devices  Walker - front wheel  Oxygen  None - Room Air  Diet/Therapeutic Dining  Current Diet Order   Procedures    Diet Order Diet: Consistent CHO (Diabetic)     Pill Administration  whole  Agitated Behavioral Scale  16  ABS Level of Severity  No Agitation    Fall Risk  Has the patient had a fall this admission?   No  Hanh Brennan Fall Risk Scoring  17, HIGH RISK  Fall Risk Safety Measures  bed alarm, chair alarm, and poor balance    Vitals  Temperature: 36.2 °C (97.2 °F)  Temp src: Oral  Pulse: 63  Respiration: 20  Blood Pressure : (!) 148/71  Blood Pressure MAP (Calculated): 97 MM HG  BP Location: Right, Upper Arm  Patient BP Position: Supine     Oxygen  Pulse Oximetry: 94 %  O2 (LPM): 0  O2 Delivery Device: None - Room Air    Bowel and Bladder  Last Bowel Movement  03/07/24  Stool Type  Type 4: Like a sausage or snake, smooth and soft  Bowel Device  Bathroom, Other (Comment)  Continent  Bladder: Did not void   Bowel: No movement  Bladder Function  Urine Void (mL):  (bathroom)  Number of Times Voided: 1  Urinary Options: Yes  Urine Color: Yellow  Genitourinary Assessment   Bladder Assessment (WDL):  Within Defined Limits  Hartman Catheter: Not Applicable  Urine Color: Yellow  Bladder Device: Bathroom  Time Void: Yes  Bladder Scan: Post Void  $ Bladder Scan Results (mL): 1    Skin  Michael Score   18  Sensory Interventions   Bed Types: Standard/Trauma Mattress with Overlay  Skin Preventative Measures:  Pillows in Use to Float Heels, Pillows in Use for Support / Positioning  Moisture Interventions         Pain  Pain Rating Scale  0 - No Pain  Pain Location  Back  Pain Location Orientation  Lower  Pain Interventions   [place] (ASLEEP)    ADLs    Bathing   Shower  Linen Change   Partial  Personal Hygiene  Perineal Care, Moist Priyanka Wipes  Chlorhexidine Bath      Oral Care  Brushed Teeth  Teeth/Dentures     Shave     Nutrition Percentage Eaten  Lunch, Between % Consumed  Environmental Precautions  Treaded Slipper Socks on Patient, Bed in Low Position  Patient Turns/Positioning  Patient Turns Self from Side to Side  Patient Turns Assistance/Tolerance     Bed Positions  Bed Controls On, Bed Locked  Head of Bed Elevated  Self regulated      Psychosocial/Neurologic Assessment  Psychosocial Assessment  Psychosocial (WDL):  Within Defined Limits  Neurologic Assessment  Neuro (WDL): Exceptions to WDL  Level of Consciousness: Alert  Orientation Level: Oriented to place, Oriented to situation, Oriented to person  Cognition: Appropriate judgement, Appropriate attention/concentration  Speech: Clear  Pupil Assesment: No  Muscle Strength Right Arm: Good Strength Against Gravity and Moderate Resistance  Muscle Strength Left Arm: Good Strength Against Gravity and Moderate Resistance  Muscle Strength Right Leg: Good Strength Against Gravity and Moderate Resistance  Muscle Strength Left Leg: Good Strength Against Gravity and Moderate Resistance  EENT (WDL):  WDL Except    Cardio/Pulmonary Assessment  Edema      Respiratory Breath Sounds  RUL Breath Sounds: Clear  RML Breath Sounds: Clear  RLL Breath Sounds: Clear, Diminished  IRAJ Breath Sounds: Clear  LLL Breath Sounds: Clear, Diminished  Cardiac Assessment   Cardiac (WDL):  WDL Except (HTN)

## 2024-03-08 NOTE — DISCHARGE PLANNING
Case management  Reviewed signed copy of IMM and answered all questions.    Tc to pt's dtr Adriana reviewing below:   Paperwork for [facility] completed (left @ bedside w/ pt) [employer] has been ordered; referral sent to [facility] (they accepted); pt has fww/4ww @ home; pt will stay w / her dtr in [place]; information provided on pcp's as pt is new to the area.  Pharmacy of choice - [facility] on [address]/ closed on Sundays.     Dtr will provide transportation home.      Dc date /disposition: 3/10 home w/ home health

## 2024-03-09 ENCOUNTER — APPOINTMENT (OUTPATIENT)
Dept: PHYSICAL THERAPY | Facility: REHABILITATION | Age: 89
DRG: 560 | End: 2024-03-09
Attending: PHYSICAL MEDICINE & REHABILITATION
Payer: MEDICARE

## 2024-03-09 ENCOUNTER — APPOINTMENT (OUTPATIENT)
Dept: OCCUPATIONAL THERAPY | Facility: REHABILITATION | Age: 89
DRG: 560 | End: 2024-03-09
Attending: PHYSICAL MEDICINE & REHABILITATION
Payer: MEDICARE

## 2024-03-09 PROCEDURE — 97535 SELF CARE MNGMENT TRAINING: CPT | Mod: CO

## 2024-03-09 PROCEDURE — 99231 SBSQ HOSP IP/OBS SF/LOW 25: CPT | Performed by: HOSPITALIST

## 2024-03-09 PROCEDURE — 36415 COLL VENOUS BLD VENIPUNCTURE: CPT

## 2024-03-09 PROCEDURE — 97116 GAIT TRAINING THERAPY: CPT

## 2024-03-09 PROCEDURE — 700101 HCHG RX REV CODE 250: Performed by: PHYSICAL MEDICINE & REHABILITATION

## 2024-03-09 PROCEDURE — 97110 THERAPEUTIC EXERCISES: CPT

## 2024-03-09 PROCEDURE — 770010 HCHG ROOM/CARE - REHAB SEMI PRIVAT*

## 2024-03-09 PROCEDURE — 97110 THERAPEUTIC EXERCISES: CPT | Mod: CO

## 2024-03-09 PROCEDURE — 700102 HCHG RX REV CODE 250 W/ 637 OVERRIDE(OP): Performed by: HOSPITALIST

## 2024-03-09 PROCEDURE — 700111 HCHG RX REV CODE 636 W/ 250 OVERRIDE (IP): Performed by: PHYSICAL MEDICINE & REHABILITATION

## 2024-03-09 PROCEDURE — 86480 TB TEST CELL IMMUN MEASURE: CPT

## 2024-03-09 PROCEDURE — 97530 THERAPEUTIC ACTIVITIES: CPT

## 2024-03-09 PROCEDURE — A9270 NON-COVERED ITEM OR SERVICE: HCPCS | Performed by: PHYSICAL MEDICINE & REHABILITATION

## 2024-03-09 PROCEDURE — 700102 HCHG RX REV CODE 250 W/ 637 OVERRIDE(OP): Performed by: PHYSICAL MEDICINE & REHABILITATION

## 2024-03-09 PROCEDURE — A9270 NON-COVERED ITEM OR SERVICE: HCPCS | Performed by: HOSPITALIST

## 2024-03-09 RX ORDER — GABAPENTIN 100 MG/1
100 CAPSULE ORAL 3 TIMES DAILY
Qty: 90 CAPSULE | Refills: 2 | Status: SHIPPED | OUTPATIENT
Start: 2024-03-09

## 2024-03-09 RX ORDER — METHOCARBAMOL 500 MG/1
500 TABLET, FILM COATED ORAL 3 TIMES DAILY PRN
Qty: 90 TABLET | Refills: 0 | Status: SHIPPED | OUTPATIENT
Start: 2024-03-09

## 2024-03-09 RX ORDER — ROSUVASTATIN CALCIUM 10 MG/1
10 TABLET, COATED ORAL DAILY
Qty: 30 TABLET | Refills: 2 | Status: SHIPPED | OUTPATIENT
Start: 2024-03-09

## 2024-03-09 RX ORDER — OXYCODONE HYDROCHLORIDE 5 MG/1
2.5-5 TABLET ORAL
Qty: 7 TABLET | Refills: 0 | Status: SHIPPED | OUTPATIENT
Start: 2024-03-09 | End: 2024-03-16

## 2024-03-09 RX ORDER — AMLODIPINE BESYLATE 10 MG/1
10 TABLET ORAL DAILY
Qty: 30 TABLET | Refills: 2 | Status: SHIPPED | OUTPATIENT
Start: 2024-03-09

## 2024-03-09 RX ORDER — IRBESARTAN 300 MG/1
300 TABLET ORAL DAILY
Qty: 30 TABLET | Refills: 2 | Status: SHIPPED | OUTPATIENT
Start: 2024-03-09

## 2024-03-09 RX ADMIN — Medication 1000 UNITS: at 08:19

## 2024-03-09 RX ADMIN — OMEPRAZOLE 20 MG: 20 CAPSULE, DELAYED RELEASE ORAL at 08:18

## 2024-03-09 RX ADMIN — METHOCARBAMOL 500 MG: 500 TABLET ORAL at 08:19

## 2024-03-09 RX ADMIN — GABAPENTIN 100 MG: 100 CAPSULE ORAL at 08:18

## 2024-03-09 RX ADMIN — METFORMIN HYDROCHLORIDE 500 MG: 500 TABLET ORAL at 08:23

## 2024-03-09 RX ADMIN — DOCUSATE SODIUM 50MG AND SENNOSIDES 8.6MG 2 TABLET: 8.6; 5 TABLET, FILM COATED ORAL at 20:02

## 2024-03-09 RX ADMIN — GABAPENTIN 100 MG: 100 CAPSULE ORAL at 20:02

## 2024-03-09 RX ADMIN — METHOCARBAMOL 500 MG: 500 TABLET ORAL at 20:02

## 2024-03-09 RX ADMIN — AMLODIPINE BESYLATE 10 MG: 5 TABLET ORAL at 05:39

## 2024-03-09 RX ADMIN — GABAPENTIN 100 MG: 100 CAPSULE ORAL at 16:00

## 2024-03-09 RX ADMIN — ROSUVASTATIN CALCIUM 10 MG: 10 TABLET, COATED ORAL at 08:18

## 2024-03-09 RX ADMIN — ACETAMINOPHEN 650 MG: 325 TABLET ORAL at 08:18

## 2024-03-09 RX ADMIN — METHOCARBAMOL 500 MG: 500 TABLET ORAL at 16:00

## 2024-03-09 RX ADMIN — ENOXAPARIN SODIUM 30 MG: 100 INJECTION SUBCUTANEOUS at 17:23

## 2024-03-09 RX ADMIN — IRBESARTAN 300 MG: 150 TABLET ORAL at 05:39

## 2024-03-09 RX ADMIN — LIDOCAINE 1 PATCH: 4 PATCH TOPICAL at 05:40

## 2024-03-09 ASSESSMENT — BRIEF INTERVIEW FOR MENTAL STATUS (BIMS)
BIMS SUMMARY SCORE: 15
ASKED TO RECALL BLUE: YES, NO CUE REQUIRED
WHAT MONTH IS IT: ACCURATE WITHIN 5 DAYS
ASKED TO RECALL BED: YES, NO CUE REQUIRED
ASKED TO RECALL SOCK: YES, NO CUE REQUIRED
INITIAL REPETITION OF BED BLUE SOCK - FIRST ATTEMPT: 3
WHAT YEAR IS IT: CORRECT
WHAT DAY OF THE WEEK IS IT: CORRECT

## 2024-03-09 ASSESSMENT — PAIN DESCRIPTION - PAIN TYPE
TYPE: ACUTE PAIN;SURGICAL PAIN
TYPE: ACUTE PAIN;SURGICAL PAIN

## 2024-03-09 ASSESSMENT — ENCOUNTER SYMPTOMS
BLURRED VISION: 0
HALLUCINATIONS: 0
VOMITING: 0
PALPITATIONS: 0
FEVER: 0
NAUSEA: 0
HEADACHES: 0
SHORTNESS OF BREATH: 0
DIZZINESS: 0

## 2024-03-09 ASSESSMENT — GAIT ASSESSMENTS
GAIT LEVEL OF ASSIST: STANDBY ASSIST
DISTANCE (FEET): 110
ASSISTIVE DEVICE: 4 WHEEL WALKER
DEVIATION: DECREASED BASE OF SUPPORT
ASSISTIVE DEVICE: FRONT WHEEL WALKER;4 WHEEL WALKER
GAIT LEVEL OF ASSIST: STANDBY ASSIST

## 2024-03-09 ASSESSMENT — ACTIVITIES OF DAILY LIVING (ADL)
TOILET_TRANSFER_DESCRIPTION: ADAPTIVE EQUIPMENT;GRAB BAR;INCREASED TIME
TOILET_TRANSFER_LEVEL_OF_ASSIST: ABLE TO COMPLETE TOILET TRANSFER WITHOUT ASSIST
SHOWER_TRANSFER_LEVEL_OF_ASSIST: ABLE TO COMPLETE SHOWER TRANSFER WITHOUT ASSIST
TOILETING_LEVEL_OF_ASSIST: ABLE TO COMPLETE TOILETING WITHOUT ASSIST
TOILETING_LEVEL_OF_ASSIST_DESCRIPTION: INCREASED TIME

## 2024-03-09 NOTE — THERAPY
"Occupational Therapy  Daily Treatment     Patient Name: Mariana Jett  Age:  89 y.o., Sex:  female  Medical Record #: [id number]  Today's Date: 3/9/2024     Precautions  Precautions: Fall Risk, Spinal / Back Precautions          Subjective    \"I took a shower last night because they told me I wouldn't have time today\" referring to night shift CNAs. Pt seated in w/c upon arrival and agreeable for OT session.     Objective       03/09/24 0931   OT Charge Group   OT Self Care / ADL (Units) 3   OT Therapeutic Exercise (Units) 1   OT Total Time Spent   OT Individual Total Time Spent (Mins) 60   Precautions   Precautions Fall Risk;Spinal / Back Precautions    Functional Level of Assist   Grooming Modified Independent  (brush teeth, comb hair, washed hands)   Grooming Description Increased time;Standing at sink  (4WW)   Upper Body Dressing Modified Independent   Upper Body Dressing Description Increased time   Lower Body Dressing Supervision   Lower Body Dressing Description Increased time;Cues for spinal precautions   Toileting Modified Independent   Toileting Description Increased time   Toilet Transfers Supervised   Toilet Transfer Description Adaptive equipment;Grab bar;Increased time  (4WW)   Sitting Upper Body Exercises   Upper Extremity Bike Level 2 Resistance  (~15min w/ no rest breaks.)   Interdisciplinary Plan of Care Collaboration   Patient Position at End of Therapy Seated;Chair Alarm On   Eating   Assistance Needed Independent   Physical Assistance Level No physical assistance   CARE Score - Eating 6   Oral Hygiene   Assistance Needed Independent   Physical Assistance Level No physical assistance   CARE Score - Oral Hygiene 6   Toileting Hygiene   Assistance Needed Independent   Physical Assistance Level No physical assistance   CARE Score - Toileting Hygiene 6   Shower/Bathe Self   Assistance Needed Adaptive equipment   Physical Assistance Level No physical assistance   CARE Score - Shower/Bathe Self 6   Upper Body " "Dressing   Assistance Needed Independent   Physical Assistance Level No physical assistance   CARE Score - Upper Body Dressing 6   Lower Body Dressing   Assistance Needed Independent   Physical Assistance Level No physical assistance   CARE Score - Lower Body Dressing 6   Putting On/Taking Off Footwear   Assistance Needed Adaptive equipment   Physical Assistance Level 25% or less   CARE Score - Putting On/Taking Off Footwear 3   Toilet Transfer   Assistance Needed Independent   Physical Assistance Level No physical assistance   CARE Score - Toilet Transfer 6   Cognitive Pattern Assessment   Cognitive Pattern Assessment Used BIMS   Brief Interview for Mental Status (BIMS)   Repetition of Three Words (First Attempt) 3   Temporal Orientation: Year Correct   Temporal Orientation: Month Accurate within 5 days   Temporal Orientation: Day Correct   Recall: \"Sock\" Yes, no cue required   Recall: \"Blue\" Yes, no cue required   Recall: \"Bed\" Yes, no cue required   BIMS Summary Score 15   Confusion Assessment Method (CAM)   Is there evidence of an acute change in mental status from the patient's baseline? No   Inattention Behavior not present   Disorganized thinking Behavior not present   Altered level of consciousness Behavior not present   Discharge Summary    Discharge Location  Home   Patient Discharging with Assist of Family    Level of Supervision Required Intermittent Supervision   Recommended Equipment for Discharge Shower Chair;Grab Bars by Toilet;Sock Aid;Reacher;Dressing Stick   Recommended Services Upon Discharge Home Health Occupational Therapy   Long Term Goals Met 2   Long Term Goals Not Met 0   Reason(s) for Goals Not Met n/a   Criteria for Termination of Services Maximum Function Achieved for Inpatient Rehabilitation   Discharge Instructions to Patient   Level of Assist Required for Eating Able to Complete Eating without Assist   Level of Assist Required for Grooming Able to Complete Grooming without Assist "   Level of Assist Required for Dressing Requires Supervision with Dressing   Equipment for Dressing Sock Aid;Reacher   Level of Assist Required for Toileting Able to Complete Toileting without Assist   Level of Assist Required for Toilet Transfer Able to Complete Toilet Transfer without Assist   Level of Assist Required for Bathing Requires Supervision with Bathing   Equipment for Bathing Shower Chair;Long Handled Sponge;Hand Held Shower Head;Grab Bars in Tub / Shower   Level of Assist Required for Shower Transfer Able to Complete Shower Transfer without Assist   Equipment for Shower Transfer Shower Chair   Level of Assist Required for Home Mgmt Requires Supervision with Home Management   Level of Assist Required for Meal Prep Requires Supervision with Meal Preparation   Driving Please Contact Physician Prior to Driving   Home Exercise Program None Issued     Pt was able to complete STS w/ functional mob from bed>restroom>middle gym using 4WW at [facility]/SPV.     Assessment    Pt tolerated session well w/ focus on ADLs, thera ex to increase Ind for all functional tasks.    Strengths: Able to follow instructions, Effective communication skills, Good insight into deficits/needs, Independent prior level of function, Motivated for self care and independence, Pleasant and cooperative, Willingly participates in therapeutic activities  Barriers: Generalized weakness, Impaired balance, Impaired activity tolerance, Pain, Decreased endurance, Bladder incontinence    Plan    D/c IRF-Richard   ADLs with spinal precautions at FWW level   Standing tolerance/balance  Strengthening, endurance    DME       Passport items to be completed:  Perform bathroom transfers, complete dressing, complete feeding, get ready for the day, prepare a simple meal, participate in household tasks, adapt home for safety needs, demonstrate home exercise program, complete caregiver training     Occupational Therapy Goals (Active)       There are no active  problems.

## 2024-03-09 NOTE — PROGRESS NOTES
NURSING DAILY NOTE    Name: Mariana Jett   Date of Admission: 3/1/2024   Admitting Diagnosis: Closed compression fracture of L2 lumbar vertebra, initial encounter ([facility])  Attending Physician: Wendy Pride D.o.  Allergies: Patient has no known allergies.    Safety  Patient Assist  Min assist  Patient Precautions  Fall Risk, Spinal / Back Precautions   Precaution Comments     Bed Transfer Status  Standby Assist  Toilet Transfer Status   Supervised  Assistive Devices  Gait Belt, Rails, Wheelchair  Oxygen  None - Room Air  Diet/Therapeutic Dining  Current Diet Order   Procedures    Diet Order Diet: Consistent CHO (Diabetic)     Pill Administration  whole  Agitated Behavioral Scale  16  ABS Level of Severity  No Agitation    Fall Risk  Has the patient had a fall this admission?   No  Hanh Brennan Fall Risk Scoring  17, HIGH RISK  Fall Risk Safety Measures  bed alarm, chair alarm, poor balance, and low vision/ hearing    Vitals  Temperature: 36.4 °C (97.6 °F)  Temp src: Oral  Pulse: 78  Respiration: 16  Blood Pressure : (!) 152/77  Blood Pressure MAP (Calculated): 102 MM HG  BP Location: Right, Upper Arm  Patient BP Position: Supine     Oxygen  Pulse Oximetry: 95 %  O2 (LPM): 0  O2 Delivery Device: None - Room Air    Bowel and Bladder  Last Bowel Movement  03/07/24  Stool Type  Type 4: Like a sausage or snake, smooth and soft  Bowel Device  Bathroom  Continent  Bladder: Did not void   Bowel: No movement  Bladder Function  Urine Void (mL):  (bathroom)  Number of Times Voided: 1  Urinary Options: Yes  Urine Color: Yellow  Genitourinary Assessment   Bladder Assessment (WDL):  Within Defined Limits  Hartman Catheter: Not Applicable  Urine Color: Yellow  Bladder Device: Bathroom  Time Void: Yes  Bladder Scan: Post Void  $ Bladder Scan Results (mL): 1    Skin  Michael Score   18  Sensory Interventions   Bed Types: Standard/Trauma Mattress with Overlay  Skin Preventative  Measures: Pillows in Use for Support / Positioning  Moisture Interventions         Pain  Pain Rating Scale  2 - Notice Pain, does not interfere with activities  Pain Location  Back, Generalized  Pain Location Orientation  Lower  Pain Interventions   Distraction    ADLs    Bathing   Shower, Staff  Linen Change   Complete  Personal Hygiene  Perineal Care, Moist Priyanka Wipes  Chlorhexidine Bath      Oral Care  Brushed Teeth  Teeth/Dentures     Shave     Nutrition Percentage Eaten  Lunch, Between 25-50% Consumed (30%)  Environmental Precautions  Treaded Slipper Socks on Patient, Personal Belongings, Wastebasket, Call Bell etc. in Easy Reach, Bed in Low Position  Patient Turns/Positioning  Patient Turns Self from Side to Side  Patient Turns Assistance/Tolerance     Bed Positions  Bed Controls On, Bed Locked  Head of Bed Elevated  Self regulated      Psychosocial/Neurologic Assessment  Psychosocial Assessment  Psychosocial (WDL):  Within Defined Limits  Neurologic Assessment  Neuro (WDL): Exceptions to WDL  Level of Consciousness: Alert  Orientation Level: Oriented to place, Oriented to situation, Oriented to person  Cognition: Appropriate judgement, Appropriate attention/concentration  Speech: Clear  Pupil Assesment: No  Muscle Strength Right Arm: Good Strength Against Gravity and Moderate Resistance  Muscle Strength Left Arm: Good Strength Against Gravity and Moderate Resistance  Muscle Strength Right Leg: Good Strength Against Gravity and Moderate Resistance  Muscle Strength Left Leg: Good Strength Against Gravity and Moderate Resistance  EENT (WDL):  WDL Except    Cardio/Pulmonary Assessment  Edema      Respiratory Breath Sounds  RUL Breath Sounds: Clear  RML Breath Sounds: Clear  RLL Breath Sounds: Clear, Diminished  IRAJ Breath Sounds: Clear  LLL Breath Sounds: Clear, Diminished  Cardiac Assessment   Cardiac (WDL):  WDL Except (HTN)

## 2024-03-09 NOTE — CARE PLAN
"  Problem: Fall Risk - Rehab  Goal: Patient will remain free from falls  Note: Hanh Brennan Fall risk Assessment Score: 17    High fall risk Interventions   - Alarming seatbelt  - Wander guard  - Bed and strip alarm   - Yellow sign by the door   - Yellow wrist band \"Fall risk\"  - Room near to the nurse station  - Do not leave patient unattended in the bathroom  - Fall risk education provided          The patient is Watcher - Medium risk of patient condition declining or worsening    Shift Goals  Clinical Goals: Safety  Patient Goals: Safety        "

## 2024-03-09 NOTE — DISCHARGE SUMMARY
Physical Medicine & Rehabilitation Discharge Summary    Admission Date: 3/1/2024    Discharge Date: 3/10/2024    Attending Provider: Wendy Pride DO, MS    Admission Diagnosis:   Active Hospital Problems    Diagnosis     *Closed compression fracture of L2 lumbar vertebra, initial encounter (HCC)     Lumbar compression fracture, closed, initial encounter (HCC)     Elevated alkaline phosphatase level     Hypertension     Diabetes mellitus type II, controlled (HCC)     Dyslipidemia        Discharge Diagnosis:  Active Hospital Problems    Diagnosis     *Closed compression fracture of L2 lumbar vertebra, initial encounter (HCC)     Lumbar compression fracture, closed, initial encounter (HCC)     Elevated alkaline phosphatase level     Hypertension     Diabetes mellitus type II, controlled (HCC)     Dyslipidemia        HPI per Admission History & Physical:  Mariana Jett is an 89-year-old female who has a past medical history significant for falls s/p kyphoplasties, hypertension, hyperlipidemia, type 2 diabetes mellitus who was residing at her assisted living facility when she fell trying to hang her clothes 2/25/2024.  She developed back pain and subsequently became more unable to ambulate and then ultimately unable to get out of her bed.  Her daughter brought her into the emergency department.  She was found to have an L2 compression fracture.  She was set up for kyphoplasty.  She underwent kyphoplasty on 2/29/2024.  Her hospital course has also been complicated by urinary tract infection with elevated white count.  She continues on antibiotics.  Patient was functioning far below her baseline prior to kyphoplasty.  Afterwards she did make progress and went from a max assist x 2 people up to approximately min assist.  Of note, prior to kyphoplasty patient was having progressively worse pain and MRI of the lumbar spine was performed.  It showed bilateral moderate to severe foraminal stenosis at L4-5 and severe right  and moderate left foraminal stenosis at L5-S1.  Case was discussed with orthospine, Dr. Chew who did not recommend neurosurgical intervention.     Patient deemed an appropriate candidate by PT/OT and was admitted to the acute rehab unit 3/1/2024.  On admission patient reports she is doing ok. States that her daughter says she will not be going back to her [facility]. Patient pooped this morning. She doesn't want any bowel medications.    Patient was admitted to [facility] on 3/1/2024.     Hospital Course by Problem List:  L2 compression fracture secondary to fall at [facility] 2/27/2024  S/p L2 Cementoplasty 2/29/2024 Dr. Sawyer  PT and OT for mobility and ADLs. Per guidelines, 15 hours per week between PT, OT and/or SLP.  Follow-up with PCP  Restrictions: No strenuous activity, heavy lifting, twisting for 1-2 weeks.  Do not lift anything greater than 7 pounds for 1-2 weeks.     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 Chew  Follow-up orthospine as an outpatient     Hyponatremia  Fluctuates greater than 130 3/4  3/7 stable at 133      Leukocytosis  Being treated for UTI  3/4, 3/7 resolved     Anemia  3/4 fluctuant from 10.6 up to 13.1 down to 11.9 today  3/7 resolved     Thrombocytosis  Resolved prior to admission  3/7 resolved     Hypertension  Continue amlodipine 5 mg daily --> 10 mg daily 3/4  Continue Avapro 300 mg daily  3/6 blood pressure elevated, amlodipine increased per hospitalist.  3/8 blood pressure slightly labile blood with blood pressure within normal limits.     Elevated alkaline phosphatase  Expected after bony trauma, improving 3/7     Hyperlipidemia  Continue rosuvastatin 10 mg nightly     Type 2 diabetes mellitus with hyperglycemia  Sliding scale insulin discontinued.  Continue metformin 500 mg daily     Pain  As needed Tylenol  As needed oxycodone  As needed ice  Scheduled Robaxin 500 mg  4 times daily--> decreased to 3 times daily  Scheduled lidocaine patch every 24 hours  Scheduled gabapentin 100 mg 3 times daily    I discussed the risks and benefits of using opiate medications for pain control.  I discussed the risk of addiction, potential for overdose, and respiratory depression (and the potential need for opiate antagonist therapy if this occurs).  I encouraged the patient to take this medication sparingly with the expressed goal of weaning off the medication as soon as is clinically appropriate.  I informed the patient that we are only able to provide a 7 day supply of these medications at discharge and that they will be responsible for requesting any refills needed from their primary care provider or their surgeon.  We discussed the need to safely secure these medications to prevent theft, inadvertent ingestion, or misuse.  Any unused medication should be immediately disposed of through a sanctioned medication disposal program.  We discussed adjunctive pain medications and conservative therapies at length.      I answered the patient's questions regarding this treatment, and the patient indicated understanding and willingness to proceed.       Skin  Patient at risk for skin breakdown due to debility in areas including sacrum, achilles, elbows and head in addition to other sites. Nursing to assess skin daily.      GI Ppx  Patient on Prilosec for GERD prophylaxis.      Bowel   Constipation 3/7  Continue MiraLAX 1 packet twice daily PRN  3/7 additional dosages of MiraLAX ordered, however, patient going to use restroom after interview.  3/8 had large bowel movement 3/7.     Bladder  Pseudomonas UTI (onset prior to admission)  TV/PVR/BS PRN  3/4 Ciprofloxacin 500 mg twice daily for 5-day course through 3/5.         DVT PROPHYLAXIS: Lovenox 30 mg subcutaneously nightly (adjusted for weight)     HOSPITALIST FOLLOWING: Discussed with hospitalist 3/8     CODE STATUS: DNR/DNI     DISPO: Home with  spouse     JAKE: 3/10/24     MEDS SENT TO: [facility] [address]     DISCHARGE SPECIALIST FOLLOW UP: Orthospine     Patient to scheduled follow up with their PCP within 2 weeks from discharge from the [facility].     Functional Status at Discharge  Eating:  Supervision  Eating Description:  Increased time, Set-up of equipment or meal/tube feeding, Supervision for safety  Grooming:  Modified Independent (brush teeth, comb hair, washed hands)  Grooming Description:  Increased time, Standing at sink (4WW)  Bathing:  Contact Guard Assist (CGA for standing; sup for seated)  Bathing Description:  Grab bar, Increased time, Supervision for safety, Verbal cueing, Set-up of equipment, Initial preparation for task, Hand held shower, Long handled bath tool  Upper Body Dressing:  Modified Independent  Upper Body Dressing Description:  Increased time  Lower Body Dressing:  Supervision  Lower Body Dressing Description:  Increased time, Cues for spinal precautions  Discharge Location : Home  Patient Discharging with Assist of: Family   Level of Supervision Required: Intermittent Supervision  Recommended Equipment for Discharge: Shower Chair;Grab Bars by Toilet;Sock Aid;Reacher;Dressing Stick  Recommended Services Upon Discharge: Home Health Occupational Therapy  Long Term Goals Met: 2  Long Term Goals Not Met: 0  Reason(s) for Goals Not Met: n/a  Criteria for Termination of Services: Maximum Function Achieved for Inpatient Rehabilitation  Walk:  Standby Assist  Distance Walked:   (85ft x 2 4WW SBA, 65ft 4WW SBA)  Number of Times Distance Was Traveled:  3  Assistive Device:  4 Wheel Walker  Gait Deviation:  Decreased Base Of Support, Bradykinetic  Wheelchair:     Distance Propelled:      Wheelchair Description:     Stairs Minimal Assist (CGA to ascend/ min A to descend)  Stairs Description Extra time, Hand rails (step to)     Comprehension:     Comprehension Description:     Expression:     Expression Description:      Social Interaction:     Social Interaction Description:     Problem Solving:     Problem Solving Description:     Memory:     Memory Description:          IWendy D.O., personally performed a complete drug regimen review and no potential clinically significant medication issues were identified.   Discharge Medication:     Medication List        START taking these medications        Instructions   gabapentin 100 MG Caps  Commonly known as: Neurontin   Take 1 Capsule by mouth 3 times a day.  Dose: 100 mg     oxyCODONE immediate-release 5 MG Tabs  Commonly known as: Roxicodone   Take 0.5-1 Tablets by mouth 1 time a day as needed for Severe Pain for up to 7 days.  Dose: 2.5-5 mg            CHANGE how you take these medications        Instructions   amLODIPine 10 MG Tabs  What changed:   medication strength  how much to take  Commonly known as: Norvasc   Take 1 Tablet by mouth every day.  Dose: 10 mg     methocarbamol 500 MG Tabs  What changed:   when to take this  reasons to take this  Commonly known as: Robaxin   Take 1 Tablet by mouth 3 times a day as needed (muscle spasms).  Dose: 500 mg            CONTINUE taking these medications        Instructions   Acetaminophen 500 MG Caps   Take 500-1,000 mg by mouth 2 times a day as needed. Indications: Pain  Dose: 500-1,000 mg     D3 5000 125 MCG (5000 UT) Caps  Generic drug: cholecalciferol   Take 5,000 Units by mouth every day.  Dose: 5,000 Units     irbesartan 300 MG Tabs  Commonly known as: Avapro   Take 1 Tablet by mouth every day.  Dose: 300 mg     metFORMIN 500 MG Tabs  Commonly known as: Glucophage   Take 1 Tablet by mouth every day.  Dose: 500 mg     rosuvastatin 10 MG Tabs  Commonly known as: Crestor   Take 1 Tablet by mouth every day.  Dose: 10 mg            STOP taking these medications      Actonel 35 MG Tabs  Generic drug: risedronate     aspirin 81 MG EC tablet     ciprofloxacin 500 MG Tabs  Commonly known as: Cipro     Cranberry 1000 MG  Caps              Discharge Diet:  Current Diet Order   Procedures    Diet Order Diet: Consistent CHO (Diabetic)       Discharge Activity:  Per PT/OT    Disposition:  Patient to discharge home with family support and community resources.    Equipment:  Per PT/OT    Follow-up & Discharge Instructions:  Follow up with your primary care provider (PCP) within 7-10 days of discharge to review your medications and take over your care.     If you develop chest pain, fever, chills, change in neurologic function (weakness, sensation changes, vision changes), or other concerning sxs, seek immediate medical attention or call 911.      Future Appointments   Date Time Provider Department Center   3/9/2024  2:00 PM ANDREW [facility] None       Condition on Discharge:  Good    More than 35 minutes was spent on discharging this patient, including face-to-face time, prescription management, and the dictation of this note.    Dr. Wendy Pride DO, MS  Department of Physical Medicine & Rehabilitation    Date of Service: 3/10/2024

## 2024-03-09 NOTE — THERAPY
Physical Therapy   Daily Treatment     Patient Name: Mariana Jett  Age:  89 y.o., Sex:  female  Medical Record #: [id number]  Today's Date: 3/9/2024     Precautions  Precautions: (P) Fall Risk, Spinal / Back Precautions     Subjective    Patient feels prepared for discharge     Objective       03/09/24 0831 03/09/24 1401   PT Charge Group   PT Gait Training (Units) 1 1   PT Therapeutic Exercise (Units) 1 1   PT Therapeutic Activities (Units)  --  2   PT Total Time Spent   PT Individual Total Time Spent (Mins) 30 60   Precautions   Precautions Fall Risk;Spinal / Back Precautions  Fall Risk;Spinal / Back Precautions    Gait Functional Level of Assist    Gait Level Of Assist Standby Assist Standby Assist   Assistive Device 4 Wheel Walker Front Wheel Walker;4 Wheel Walker   Distance (Feet)   (85ft x 2 4WW SBA, 65ft 4WW SBA) 110   # of Times Distance was Traveled 3 2   Deviation  --  Decreased Base Of Support   Stairs Functional Level of Assist   Level of Assist with Stairs  --  Contact Guard Assist   # of Stairs Climbed  --  2  (2 platform stairs with 4WW min assist, 2 platform stairs with FWW CGA)   Stairs Description  --  Extra time;Walker;Requires incidental assist   Sitting Lower Body Exercises   Sitting Lower Body Exercises   (used as warm-up; transition to HEP)  --    Ankle Pumps Bilateral;1 set of 10  --    Hip Abduction Bilateral;1 set of 10  --    Hip Adduction 1 set of 10;Bilateral  --    Long Arc Quad 1 set of 10;Bilateral  --    Marching 1 set of 10;Reciprocal  --    Hamstring Curl 1 set of 10;Bilateral  --    Interdisciplinary Plan of Care Collaboration   IDT Collaboration with   --  Other (See Comments)  (schedule for 30min FT prior to discharge)       Assessment    Plans to use 4WW indoors with distant SPV, FWW on stairs with CGA (provided by dtr) and CGA FWW for outdoor ambulation    Strengths: Able to follow instructions, Alert and oriented, Manages pain appropriately, Motivated for self care and  independence, Pleasant and cooperative, Supportive family, Willingly participates in therapeutic activities  Barriers: Decreased endurance, Fatigue, Impaired activity tolerance, Impaired balance, Pain, Generalized weakness    Plan  FT with dtr, SPV on transfers, ambulation; CGA on stairs  Prepare for discharge- plans to transition to [facility] ASAP    DME       Passport items to be completed:  complete caregiver training    Physical Therapy Problems (Active)       Problem: Mobility       Dates: Start:  03/02/24         Goal: STG-Within one week, patient will ascend and descend four to six stairs with rails and CGA.        Dates: Start:  03/02/24               Problem: PT-Long Term Goals       Dates: Start:  03/02/24         Goal: LTG-By discharge, patient will ambulate at least 150 feet with LRAD and supervision.        Dates: Start:  03/02/24            Goal: LTG-By discharge, patient will transfer one surface to another with LRAD and supervision.        Dates: Start:  03/02/24            Goal: LTG-By discharge, patient will ambulate up/down 4-6 stairs with B rails and SBA.        Dates: Start:  03/02/24            Goal: LTG-By discharge, patient will transfer in/out of a car with LRAD and SBA.        Dates: Start:  03/02/24

## 2024-03-09 NOTE — CARE PLAN
Problem: Knowledge Deficit - Standard  Goal: Patient and family/care givers will demonstrate understanding of plan of care, disease process/condition, diagnostic tests and medications  Outcome: Progressing     Problem: Fall Risk - Rehab  Goal: Patient will remain free from falls  Note: Pt uses call light consistently and appropriately. Waits for assistance does not attempt self transfer this shift. Able to verbalize needs.   The patient is Stable - Low risk of patient condition declining or worsening    Shift Goals  Clinical Goals: Safety  Patient Goals: Safety

## 2024-03-09 NOTE — PROGRESS NOTES
Hospital Medicine Daily Progress Note        Chief Complaint  Hypertension  Diabetes    Interval Problem Update  No significant events overnight.    Review of Systems  Review of Systems   Constitutional:  Negative for fever.   Eyes:  Negative for blurred vision.   Respiratory:  Negative for shortness of breath.    Cardiovascular:  Negative for palpitations.   Gastrointestinal:  Negative for nausea and vomiting.   Neurological:  Negative for dizziness and headaches.   Psychiatric/Behavioral:  Negative for hallucinations.         Physical Exam  Temp:  [36.4 °C (97.6 °F)-36.6 °C (97.8 °F)] 36.6 °C (97.8 °F)  Pulse:  [72-78] 72  Resp:  [16-18] 18  BP: (139-164)/(64-81) 139/64  SpO2:  [95 %-96 %] 96 %    Physical Exam  Vitals and nursing note reviewed.   Constitutional:       General: She is not in acute distress.  HENT:      Mouth/Throat:      Mouth: Mucous membranes are moist.      Pharynx: Oropharynx is clear.   Eyes:      General: No scleral icterus.  Neck:      Vascular: No JVD.   Cardiovascular:      Rate and Rhythm: Normal rate and regular rhythm.      Heart sounds: Normal heart sounds.   Pulmonary:      Effort: Pulmonary effort is normal.      Breath sounds: No wheezing or rales.   Abdominal:      General: Bowel sounds are normal.      Palpations: Abdomen is soft.   Musculoskeletal:      Cervical back: No rigidity.      Right lower leg: No edema.      Left lower leg: No edema.   Skin:     General: Skin is warm and dry.   Neurological:      Mental Status: She is alert and oriented to person, place, and time.   Psychiatric:         Mood and Affect: Mood normal.         Behavior: Behavior normal.         Fluids    Intake/Output Summary (Last 24 hours) at 3/9/2024 1043  Last data filed at 3/9/2024 0900  Gross per 24 hour   Intake 240 ml   Output --   Net 240 ml       Laboratory  Recent Labs     03/07/24  0554   WBC 6.9   RBC 4.28   HEMOGLOBIN 13.4   HEMATOCRIT 39.1   MCV 91.4   MCH 31.3   MCHC 34.3   RDW 42.5    PLATELETCT 358   MPV 8.0*     Recent Labs     03/07/24  0554   SODIUM 133*   POTASSIUM 4.2   CHLORIDE 98   CO2 22   GLUCOSE 136*   BUN 9   CREATININE 0.32*   CALCIUM 9.1                   Assessment/Plan  Lumbar compression fracture, closed, initial encounter (HCC)- (present on admission)  Assessment & Plan  2nd to GLF  S/P L2 kyphoplasty (2/29)    Dyslipidemia- (present on admission)  Assessment & Plan  Cont Crestor    Diabetes mellitus type II, controlled ([facility])- (present on admission)  Assessment & Plan  Hba1c: 6.4  Cont Metformin  Off accuchecks  Note: home meds include Metformin 500 mg daily usually, but pt reports she takes twice daily as needed if FSBS >150    Hypertension- (present on admission)  Assessment & Plan  BP labile  Cont Norvasc  Cont Avapro  Cont to monitor

## 2024-03-09 NOTE — CARE PLAN
Problem: Dressing  Goal: STG-Within one week, patient will dress LB  Description: At a Min A level with AE/AD PRN and ongoing adherence to spinal precautions.  Outcome: Met     Problem: Toileting  Goal: STG-Within one week, patient will complete toileting tasks  Description: a Min A level with AE/AD PRN and ongoing adherence to spinal precautions.    Outcome: Met     Problem: IADL's  Goal: STG-Within one week, patient will access kitchen area  Description: At a SUP level with LRD and seated RB as needed.   Outcome: Met     Problem: OT Long Term Goals  Goal: LTG-By discharge, patient will complete basic self care tasks  Description: At a Min A to Mod I level with AE/AD/DME PRN.   Outcome: Met  Goal: LTG-By discharge, patient will perform bathroom transfers  Description: At a SUP to Mod I level with AE/AD/DME PRN.   Outcome: Met

## 2024-03-09 NOTE — DISCHARGE INSTRUCTIONS
[facility] NURSING DISCHARGE INSTRUCTIONS    Blood Pressure : 118/75  Weight: 47.9 kg (105 lb 9.6 oz)  Nursing recommendations for Mariana Jett at time of discharge are as follows:  Client verbalized understanding of all discharge instructions and prescriptions.     Review all your home medications and newly ordered medications with your doctor and/or pharmacist. Follow medication instructions as directed by your doctor and/or pharmacist.    Pain Management:   Discharge Pain Medication Instructions:  Comfort Goal: Comfort with Movement, Perform Activity, Sleep Comfortably  Notify your primary care provider if pain is unrelieved with these measures, if the pain is new, or increased in intensity.    Discharge Skin Characteristics:    Discharge Skin Exam:       Skin / Wound Care Instructions: Please contact your primary care physician for any change in skin integrity.   If You Have Surgical Incisions / Wounds:  Monitor surgical site(s) for signs of increased swelling, redness or symptoms of drainage from the site or fever as this could indicate signs and symptoms of infection. If these symptoms are noted, notifiy your primary care provider.      Discharge Safety Instructions: Should Not Be Left Alone In The House     Discharge Safety Concerns: Other (Comments) (L2 compression fracture)  The interdisciplinary team has made recommendation that you should not be left alone  in the house due to Hx of L2 compression fracture.  Anti-embolic stockings are not required to increase circulation to the lower extremities.    Discharge Diet: Diabetic     Discharge Liquids: Thin  Discharge Bowel Function: Continent  Please contact your primary care physician for any changes in bowel habits.  Discharge Bowel Program:    Discharge Bladder Function: Continent  Discharge Urinary Devices:        Nursing Discharge Plan:   Influenza Vaccine Indication: Not indicated: Previously immunized this influenza season and > 8  years of age    Case Management Discharge Instructions:   Discharge Location: Assisted Living  Agency Name/Address/Phone:    Home Health:    Outpatient Services:    DME Provider/Phone:    Medical Equipment Ordered:    Prescription Faxed to:        Discharge Medication Instructions:  Below are the medications your physician expects you to take upon discharge:    Diabetes Mellitus and Nutrition, Adult  When you have diabetes, or diabetes mellitus, it is very important to have healthy eating habits because your blood sugar (glucose) levels are greatly affected by what you eat and drink. Eating healthy foods in the right amounts, at about the same times every day, can help you:  Manage your blood glucose.  Lower your risk of heart disease.  Improve your blood pressure.  Reach or maintain a healthy weight.  What can affect my meal plan?  Every person with diabetes is different, and each person has different needs for a meal plan. Your health care provider may recommend that you work with a dietitian to make a meal plan that is best for you. Your meal plan may vary depending on factors such as:  The calories you need.  The medicines you take.  Your weight.  Your blood glucose, blood pressure, and cholesterol levels.  Your activity level.  Other health conditions you have, such as heart or kidney disease.  How do carbohydrates affect me?  Carbohydrates, also called carbs, affect your blood glucose level more than any other type of food. Eating carbs raises the amount of glucose in your blood.  It is important to know how many carbs you can safely have in each meal. This is different for every person. Your dietitian can help you calculate how many carbs you should have at each meal and for each snack.  How does alcohol affect me?  Alcohol can cause a decrease in blood glucose (hypoglycemia), especially if you use insulin or take certain diabetes medicines by mouth. Hypoglycemia can be a life-threatening condition. Symptoms  "of hypoglycemia, such as sleepiness, dizziness, and confusion, are similar to symptoms of having too much alcohol.  Do not drink alcohol if:  Your health care provider tells you not to drink.  You are pregnant, may be pregnant, or are planning to become pregnant.  If you drink alcohol:  Limit how much you have to:  0-1 drink a day for women.  0-2 drinks a day for men.  Know how much alcohol is in your drink. In the [place] one drink equals one 12 oz bottle of beer (355 mL), one 5 oz glass of wine (148 mL), or one 1½ oz glass of hard liquor (44 mL).  Keep yourself hydrated with water, diet soda, or unsweetened iced tea. Keep in mind that regular soda, juice, and other mixers may contain a lot of sugar and must be counted as carbs.  What are tips for following this plan?    Reading food labels  Start by checking the serving size on the Nutrition Facts label of packaged foods and drinks. The number of calories and the amount of carbs, fats, and other nutrients listed on the label are based on one serving of the item. Many items contain more than one serving per package.  Check the total grams (g) of carbs in one serving.  Check the number of grams of saturated fats and trans fats in one serving. Choose foods that have a low amount or none of these fats.  Check the number of milligrams (mg) of salt (sodium) in one serving. Most people should limit total sodium intake to less than 2,300 mg per day.  Always check the nutrition information of foods labeled as \"low-fat\" or \"nonfat.\" These foods may be higher in added sugar or refined carbs and should be avoided.  Talk to your dietitian to identify your daily goals for nutrients listed on the label.  Shopping  Avoid buying canned, pre-made, or processed foods. These foods tend to be high in fat, sodium, and added sugar.  Shop around the outside edge of the grocery store. This is where you will most often find fresh fruits and vegetables, bulk grains, fresh meats, and fresh " dairy products.  Cooking  Use low-heat cooking methods, such as baking, instead of high-heat cooking methods, such as deep frying.  Cook using healthy oils, such as olive, canola, or sunflower oil.  Avoid cooking with butter, cream, or high-fat meats.  Meal planning  Eat meals and snacks regularly, preferably at the same times every day. Avoid going long periods of time without eating.  Eat foods that are high in fiber, such as fresh fruits, vegetables, beans, and whole grains.  Eat 4-6 oz (112-168 g) of lean protein each day, such as lean meat, chicken, fish, eggs, or tofu. One ounce (oz) (28 g) of lean protein is equal to:  1 oz (28 g) of meat, chicken, or fish.  1 egg.  ¼ cup (62 g) of tofu.  Eat some foods each day that contain healthy fats, such as avocado, nuts, seeds, and fish.  What foods should I eat?  Fruits  Berries. Apples. Oranges. Peaches. Apricots. Plums. Grapes. Mangoes. Papayas. Pomegranates. Kiwi. Cherries.  Vegetables  Leafy greens, including lettuce, spinach, kale, chard, dannielle greens, mustard greens, and cabbage. Beets. Cauliflower. Broccoli. Carrots. Green beans. Tomatoes. Peppers. Onions. Cucumbers. [place] sprouts.  Grains  Whole grains, such as whole-wheat or whole-grain bread, crackers, tortillas, cereal, and pasta. Unsweetened oatmeal. Quinoa. Brown or wild rice.  Meats and other proteins  Seafood. Poultry without skin. Lean cuts of poultry and beef. Tofu. Nuts. Seeds.  Dairy  Low-fat or fat-free dairy products such as milk, yogurt, and cheese.  The items listed above may not be a complete list of foods and beverages you can eat and drink. Contact a dietitian for more information.  What foods should I avoid?  Fruits  Fruits canned with syrup.  Vegetables  Canned vegetables. Frozen vegetables with butter or cream sauce.  Grains  Refined white flour and flour products such as bread, pasta, snack foods, and cereals. Avoid all processed foods.  Meats and other proteins  Fatty cuts of meat.  Poultry with skin. Breaded or fried meats. Processed meat. Avoid saturated fats.  Dairy  Full-fat yogurt, cheese, or milk.  Beverages  Sweetened drinks, such as soda or iced tea.  The items listed above may not be a complete list of foods and beverages you should avoid. Contact a dietitian for more information.  Questions to ask a health care provider  Do I need to meet with a certified diabetes care and ?  Do I need to meet with a dietitian?  What number can I call if I have questions?  When are the best times to check my blood glucose?  Where to find more information:  American Diabetes Association: diabetes.org  Academy of Nutrition and Dietetics: eatright.org  National [place] of Diabetes and Digestive and Kidney Diseases: niddk.nih.gov  Association of Diabetes Care & Education Specialists: diabeteseducator.org  Summary  It is important to have healthy eating habits because your blood sugar (glucose) levels are greatly affected by what you eat and drink. It is important to use alcohol carefully.  A healthy meal plan will help you manage your blood glucose and lower your risk of heart disease.  Your health care provider may recommend that you work with a dietitian to make a meal plan that is best for you.  This information is not intended to replace advice given to you by your health care provider. Make sure you discuss any questions you have with your health care provider.  Document Revised: 07/21/2021 Document Reviewed: 07/21/2021  [employer] Patient Education © 2023 [employer]    Diabetes    Take your Diabetes medicine as directed, as well as all other prescribed mediation, even when you feel good. Tell your provider if you cannot afford your medications or if you are experiencing any side effects.  Keep simple sugars or glucose tabs with you at all times in the event of low blood sugar. Carry or wear a medical alert card or bracelet/necklace with your diabetic information.   Check your  blood sugar levels daily and according to your providers' recommendations. Keep a log of your blood sugar levels and bring with you to all your appointments. Follow the correctional scale prescribed by your provider, if you were instructed to use rapid acting insulin before meals and before bed. Be sure to account for the carbs in your meal as directed by your provider. Rapid acting insulin: Humulog (lispro), Novolog (aspart), Apidra (glulisine).  Schedule and keep follow up appointments as indicated by your provider for diabetes follow ups (bring your glucose meter and blood sugar log), annual dilated eye exam with a qualified ophthalmologist/optometrist, lab tests as requested by your provider (includes blood work and urine test) and semi-annual dental exam.  Check your feet using a hand held mirror every day, for cuts, cracks, sores, bumps, and or red spots. Avoid walking in bare feet. Only use clippers for toenails. See a podiatrist for callous care. Call or schedule an appointment with your provider if you notice sores that are not healing.  Eat a small meal or healthy snack every 3 - 4 hours. Don't skip meals. Include protein with carbohydrate at most meals. Drink water, plain or flavored with fresh fruit, instead of juice or soda. An ideal plate is made up of half non-starchy vegetables, one quarter protein and one quarter starch.  Stay or get to a healthy weight by using your meal plan and moving more. Set a goal to be more active most days of the week. Start slow by taking 10 minute walks 3 times a day. Take the stairs instead of an elevator or escalator. Twice a week, work to increase your muscle strength. Use stretch bands, do yoga, heavy gardening (digging and planting with tools), or use dumb bells to strengthen your arms.  When to call your Endocrinologist or Primary Care Provider  If your blood glucose stays over 300 mg/dL for 24 hours   If you are having frequent low blood sugars, decrease medication  dose (if previously given direction to do so by provider)  If you experience any one or all of the following symptoms: blurry vision, fast breathing, weakness, dizziness or shakiness, headache, rapid heartbeat, confusion, or irritability.    Fall Prevention in the Home, Adult  Falls can cause injuries and affect people of all ages. There are many simple things that you can do to make your home safe and to help prevent falls. Ask for help when making these changes, if needed.  What actions can I take to prevent falls?  General instructions  Use good lighting in all rooms. Replace any light bulbs that burn out, turn on lights if it is dark, and use night-lights.  Place frequently used items in easy-to-reach places. Lower the shelves around your home if necessary.  Set up furniture so that there are clear paths around it. Avoid moving your furniture around.  Remove throw rugs and other tripping hazards from the floor.  Avoid walking on wet floors.  Fix any uneven floor surfaces.  Add color or contrast paint or tape to grab bars and handrails in your home. Place contrasting color strips on the first and last steps of staircases.  When you use a stepladder, make sure that it is completely opened and that the sides and supports are firmly locked. Have someone hold the ladder while you are using it. Do not climb a closed stepladder.  Know where your pets are when moving through your home.  What can I do in the bathroom?         Keep the floor dry. Immediately clean up any water that is on the floor.  Remove soap buildup in the tub or shower regularly.  Use nonskid mats or decals on the floor of the tub or shower.  Attach bath mats securely with double-sided, nonslip rug tape.  If you need to sit down while you are in the shower, use a plastic, nonslip stool.  Install grab bars by the toilet and in the tub and shower. Do not use towel bars as grab bars.  What can I do in the bedroom?  Make sure that a bedside light is easy  to reach.  Do not use oversized bedding that reaches the floor.  Have a firm chair that has side arms to use for getting dressed.  What can I do in the kitchen?  Clean up any spills right away.  If you need to reach for something above you, use a sturdy step stool that has a grab bar.  Keep electrical cables out of the way.  Do not use floor polish or wax that makes floors slippery. If you must use wax, make sure that it is non-skid floor wax.  What can I do with my stairs?  Do not leave any items on the stairs.  Make sure that you have a light switch at the top and the bottom of the stairs. Have them installed if you do not have them.  Make sure that there are handrails on both sides of the stairs. Fix handrails that are broken or loose. Make sure that handrails are as long as the staircases.  Install non-slip stair treads on all stairs in your home.  Avoid having throw rugs at the top or bottom of stairs, or secure the rugs with carpet tape to prevent them from moving.  Choose a carpet design that does not hide the edge of steps on the stairs.  Check any carpeting to make sure that it is firmly attached to the stairs. Fix any carpet that is loose or worn.  What can I do on the outside of my home?  Use bright outdoor lighting.  Regularly repair the edges of walkways and driveways and fix any cracks.  Remove high doorway thresholds.  Trim any shrubbery on the main path into your home.  Regularly check that handrails are securely fastened and in good repair. Both sides of all steps should have handrails.  Install guardrails along the edges of any raised decks or porches.  Clear walkways of debris and clutter, including tools and rocks.  Have leaves, snow, and ice cleared regularly.  Use sand or salt on walkways during winter months.  In the garage, clean up any spills right away, including grease or oil spills.  What other actions can I take?  Wear closed-toe shoes that fit well and support your feet. Wear shoes that  "have rubber soles or low heels.  Use mobility aids as needed, such as canes, walkers, scooters, and crutches.  Review your medicines with your health care provider. Some medicines can cause dizziness or changes in blood pressure, which increase your risk of falling.  Talk with your health care provider about other ways that you can decrease your risk of falls. This may include working with a physical therapist or  to improve your strength, balance, and endurance.  Where to find more information  Centers for Disease Control and PreventionVIDHYA: www.cdc.gov  National [place] on Aging: www.valerie.nih.gov  Contact a health care provider if:  You are afraid of falling at home.  You feel weak, drowsy, or dizzy at home.  You fall at home.  Summary  There are many simple things that you can do to make your home safe and to help prevent falls.  Ways to make your home safe include removing tripping hazards and installing grab bars in the bathroom.  Ask for help when making these changes in your home.  This information is not intended to replace advice given to you by your health care provider. Make sure you discuss any questions you have with your health care provider.  Document Revised: 09/19/2022 Document Reviewed: 07/21/2021  [employer] Patient Education © 2023 [employer]    Prevent Falls in Your Home    \"Falling once doubles your chance of falling again\"        -Center for Disease Control and Prevention    Falls in the home can lead to serious injury (fractures, brain injuries), hospitalizations, increased medical costs, and could even be fatal.  The good news is, there are many precautions you can take to avoid falls in your home and help keep you safe:     If prescribed an assistive device (walker, crutches), use as instructed by the healthcare provider\"   Remove any tripping hazards from your home, including loose cords, throw rugs and clutter  Keep a nightlight on in dark (hallways, bathrooms, etc)   Get up " slowly, to make sure you feel okay before getting up  Be aware of any side effects of your medications: some medications may make you dizzy  Place a non-skid rubber mat in your shower or tub-consider a shower bench or chair if unsteady on your feet  Wear supportive shoes or non-skid socks when moving around  Start an exercise program once approved by your provider.  If you are feeling weak following a hospital stay, talk to your doctor about home health or outpatient therapy programs designed to help rebuild your strength and endurance    Depression / Suicide Risk    As you are discharged from this [facility] facility, it is important to learn how to keep safe from harming yourself.    Recognize the warning signs:  Abrupt changes in personality, positive or negative- including increase in energy   Giving away possessions  Change in eating patterns- significant weight changes-  positive or negative  Change in sleeping patterns- unable to sleep or sleeping all the time   Unwillingness or inability to communicate  Depression  Unusual sadness, discouragement and loneliness  Talk of wanting to die  Neglect of personal appearance   Rebelliousness- reckless behavior  Withdrawal from people/activities they love  Confusion- inability to concentrate     If you or a loved one observes any of these behaviors or has concerns about self-harm, here's what you can do:  Talk about it- your feelings and reasons for harming yourself  Remove any means that you might use to hurt yourself (examples: pills, rope, extension cords, firearm)  Get professional help from the community (Mental Health, Substance Abuse, psychological counseling)  Do not be alone:Call your Safe Contact- someone whom you trust who will be there for you.  Call your local CRISIS HOTLINE 633-2859 or 038-377-0087  Call your local Children's Mobile Crisis Response Team [place] (426) 548-2894 or www.Tianji  Call the toll free National Suicide  Prevention [facility]   National Suicide Prevention Lifeline 153-526-AJET (0244)  [facility] 800-SUICIDE (803-3582)              Occupational Therapy Discharge Instructions for Mariana Jett    3/9/2024    Level of Assist Required for Eating: Able to Complete Eating without Assist  Level of Assist Required for Grooming: Able to Complete Grooming without Assist  Level of Assist Required for Dressing: Requires Supervision with Dressing  Equipment for Dressing: Sock Aid, Reacher  Level of Assist Required for Toileting: Able to Complete Toileting without Assist  Level of Assist Required for Toilet Transfer: Able to Complete Toilet Transfer without Assist  Level of Assist Required for Bathing: Requires Supervision with Bathing  Equipment for Bathing: Shower Chair, Long Handled Sponge, Hand Held Shower Head, Grab Bars in Tub / Shower  Level of Assist Required for Shower Transfer: Able to Complete Shower Transfer without Assist  Equipment for Shower Transfer: Shower Chair  Level of Assist Required for Home Mgmt: Requires Supervision with Home Management  Level of Assist Required for Meal Prep: Requires Supervision with Meal Preparation  Driving: Please Contact Physician Prior to Driving  Home Exercise Program: None Issued      Physical Therapy Discharge Instructions for Mariana Jett    3/9/2024    Level of Assist Required for Ambulation: Supervision on Flat Surfaces, Assist for Balance on Curbs, Assist for Balance on Stairs  Distance Patient May Ambulate: 100  Device Recommended for Ambulation: 4-Wheeled Walker, Front-Wheeled Walker (front wheel walker inside/stairs, four wheel walker outside); provide supervision in home for mobility with FWW, provide contact guard assist for stairs and outdoor ambulation  Level of Assist Required for Transfers: Requires No Assist  Device Recommended for Transfers: 4-Wheeled Walker  Home Exercise Program: Refer to Home Exercise Program Handout for Details    Great job in physical  therapy, Mariana.  Good luck with your continued recovery.  Keep making smart mobility choices and you will go far!!! FE Figueroa

## 2024-03-09 NOTE — CARE PLAN
Problem: Knowledge Deficit - Standard  Goal: Patient and family/care givers will demonstrate understanding of plan of care, disease process/condition, diagnostic tests and medications  Outcome: Progressing     Problem: Skin Integrity  Goal: Patient's skin integrity will be maintained or improve  Note: Patient's skin is maintained and free from new or accidental injury this shift.  Will continue to monitor.   The patient is Stable - Low risk of patient condition declining or worsening    Shift Goals  Clinical Goals: Safety  Patient Goals: Safety

## 2024-03-09 NOTE — THERAPY
Occupational Therapy  Daily Treatment     Patient Name: Mariana Jett  Age:  89 y.o., Sex:  female  Medical Record #: [id number]  Today's Date: 3/9/2024     Precautions  Precautions: Fall Risk, Spinal / Back Precautions          Subjective    Pt had back to back therapy session for OT and was agreeable for a total of 90 min.      Objective       03/09/24 1030   OT Charge Group   OT Therapeutic Exercise (Units) 2   OT Total Time Spent   OT Individual Total Time Spent (Mins) 30   Sitting Upper Body Exercises   Front Arm Raise 3 sets of 10;Bilateral;Weight (See Comments for lbs)  (2lbs weighted bar)   Lat Pull 3 sets of 10;Right ;Light Resistance Theraband   Internal Shoulder Rotation 3 sets of 10;Right ;Weight (See Comments for lbs)   External Shoulder Rotation 3 sets of 10;Right ;Light Resistance Theraband   Bilateral Row 3 sets of 10;Right ;Light Resistance Theraband   Bicep Curls 3 sets of 10;Right ;Light Resistance Theraband   Bed Mobility    Sit to Supine Supervised   Interdisciplinary Plan of Care Collaboration   Patient Position at End of Therapy In Bed;Bed Alarm On;Call Light within Reach;Tray Table within Reach;Phone within Reach     Observed pt RUE decrease ROM and pt was agreeable to RUE strengthening d/t weakness. Pt needed AAROM through all UE exercises and was edu and encouraged to continue w/ BUE thera ex to increase and maintain strengthening and ROM.     Assessment    Pt tolerated session well w/ focus on UE ROM for functional tasks.   Strengths: Able to follow instructions, Effective communication skills, Good insight into deficits/needs, Independent prior level of function, Motivated for self care and independence, Pleasant and cooperative, Willingly participates in therapeutic activities  Barriers: Generalized weakness, Impaired balance, Impaired activity tolerance, Pain, Decreased endurance, Bladder incontinence    Plan    Pt is d/c-ing tomorrow and IRF-Richard were completed in earlier treatment.     DME        Passport items to be completed:  Perform bathroom transfers, complete dressing, complete feeding, get ready for the day, prepare a simple meal, participate in household tasks, adapt home for safety needs, demonstrate home exercise program, complete caregiver training     Occupational Therapy Goals (Active)       There are no active problems.

## 2024-03-09 NOTE — PROGRESS NOTES
NURSING DAILY NOTE    Name: Mariana Jett   Date of Admission: 3/1/2024   Admitting Diagnosis: Closed compression fracture of L2 lumbar vertebra, initial encounter ([facility])  Attending Physician: Wendy Pride D.o.  Allergies: Patient has no known allergies.    Safety  Patient Assist  Min assist  Patient Precautions  Fall Risk, Spinal / Back Precautions   Precaution Comments     Bed Transfer Status  Standby Assist  Toilet Transfer Status   Supervised  Assistive Devices  Wheelchair  Oxygen  None - Room Air  Diet/Therapeutic Dining  Current Diet Order   Procedures    Diet Order Diet: Consistent CHO (Diabetic)     Pill Administration  whole  Agitated Behavioral Scale  16  ABS Level of Severity  No Agitation    Fall Risk  Has the patient had a fall this admission?   No  Hanh Brennan Fall Risk Scoring  17, HIGH RISK  Fall Risk Safety Measures  bed alarm, chair alarm, poor balance, and low vision/ hearing    Vitals  Temperature: 36.4 °C (97.6 °F)  Temp src: Oral  Pulse: 78  Respiration: 16  Blood Pressure : (!) 152/77  Blood Pressure MAP (Calculated): 102 MM HG  BP Location: Right, Upper Arm  Patient BP Position: Supine     Oxygen  Pulse Oximetry: 95 %  O2 (LPM): 0  O2 Delivery Device: None - Room Air    Bowel and Bladder  Last Bowel Movement  03/08/24  Stool Type  Type 4: Like a sausage or snake, smooth and soft  Bowel Device  Bathroom  Continent  Bladder: Did not void   Bowel: No movement  Bladder Function  Urine Void (mL):  (large)  Number of Times Voided: 1  Urinary Options: Yes  Urine Color: Yellow  Genitourinary Assessment   Bladder Assessment (WDL):  Within Defined Limits  Hartman Catheter: Not Applicable  Urine Color: Yellow  Bladder Device: Bathroom  Time Void: Yes  Bladder Scan: Post Void  $ Bladder Scan Results (mL): 1    Skin  Michael Score   18  Sensory Interventions   Bed Types: Standard/Trauma Mattress with Overlay  Skin Preventative Measures: Pillows in  Use for Support / Positioning  Moisture Interventions         Pain  Pain Rating Scale  0 - No Pain  Pain Location  Back, Generalized  Pain Location Orientation  Lower  Pain Interventions   Declines    ADLs    Bathing   Shower, Staff  Linen Change   Complete  Personal Hygiene  Perineal Care, Moist Priyanka Wipes  Chlorhexidine Bath      Oral Care  Brushed Teeth  Teeth/Dentures     Shave     Nutrition Percentage Eaten  Dinner, Between 25-50% Consumed (30%)  Environmental Precautions  Treaded Slipper Socks on Patient, Personal Belongings, Wastebasket, Call Bell etc. in Easy Reach, Bed in Low Position  Patient Turns/Positioning  Patient Turns Self from Side to Side  Patient Turns Assistance/Tolerance     Bed Positions  Bed Controls On, Bed Locked  Head of Bed Elevated  Self regulated      Psychosocial/Neurologic Assessment  Psychosocial Assessment  Psychosocial (WDL):  Within Defined Limits  Neurologic Assessment  Neuro (WDL): Exceptions to WDL  Level of Consciousness: Alert  Orientation Level: Oriented to place, Oriented to situation, Oriented to person  Cognition: Appropriate judgement, Appropriate attention/concentration  Speech: Clear  Pupil Assesment: No  Muscle Strength Right Arm: Good Strength Against Gravity and Moderate Resistance  Muscle Strength Left Arm: Good Strength Against Gravity and Moderate Resistance  Muscle Strength Right Leg: Good Strength Against Gravity and Moderate Resistance  Muscle Strength Left Leg: Good Strength Against Gravity and Moderate Resistance  EENT (WDL):  WDL Except    Cardio/Pulmonary Assessment  Edema      Respiratory Breath Sounds  RUL Breath Sounds: Clear  RML Breath Sounds: Clear  RLL Breath Sounds: Clear, Diminished  IRAJ Breath Sounds: Clear  LLL Breath Sounds: Clear, Diminished  Cardiac Assessment   Cardiac (WDL):  WDL Except (HTN)

## 2024-03-10 ENCOUNTER — APPOINTMENT (OUTPATIENT)
Dept: PHYSICAL THERAPY | Facility: REHABILITATION | Age: 89
End: 2024-03-10
Attending: PHYSICAL MEDICINE & REHABILITATION
Payer: MEDICARE

## 2024-03-10 VITALS
HEART RATE: 79 BPM | WEIGHT: 105.6 LBS | SYSTOLIC BLOOD PRESSURE: 145 MMHG | TEMPERATURE: 98 F | OXYGEN SATURATION: 94 % | BODY MASS INDEX: 17.18 KG/M2 | RESPIRATION RATE: 18 BRPM | DIASTOLIC BLOOD PRESSURE: 73 MMHG

## 2024-03-10 PROCEDURE — 700101 HCHG RX REV CODE 250: Performed by: PHYSICAL MEDICINE & REHABILITATION

## 2024-03-10 PROCEDURE — A9270 NON-COVERED ITEM OR SERVICE: HCPCS | Performed by: PHYSICAL MEDICINE & REHABILITATION

## 2024-03-10 PROCEDURE — 99231 SBSQ HOSP IP/OBS SF/LOW 25: CPT | Performed by: HOSPITALIST

## 2024-03-10 PROCEDURE — A9270 NON-COVERED ITEM OR SERVICE: HCPCS | Performed by: HOSPITALIST

## 2024-03-10 PROCEDURE — 97530 THERAPEUTIC ACTIVITIES: CPT

## 2024-03-10 PROCEDURE — 99239 HOSP IP/OBS DSCHRG MGMT >30: CPT | Performed by: PHYSICAL MEDICINE & REHABILITATION

## 2024-03-10 PROCEDURE — 700102 HCHG RX REV CODE 250 W/ 637 OVERRIDE(OP): Performed by: HOSPITALIST

## 2024-03-10 PROCEDURE — 700102 HCHG RX REV CODE 250 W/ 637 OVERRIDE(OP): Performed by: PHYSICAL MEDICINE & REHABILITATION

## 2024-03-10 RX ADMIN — IRBESARTAN 300 MG: 150 TABLET ORAL at 06:05

## 2024-03-10 RX ADMIN — OMEPRAZOLE 20 MG: 20 CAPSULE, DELAYED RELEASE ORAL at 08:08

## 2024-03-10 RX ADMIN — AMLODIPINE BESYLATE 10 MG: 5 TABLET ORAL at 06:06

## 2024-03-10 RX ADMIN — METHOCARBAMOL 500 MG: 500 TABLET ORAL at 08:07

## 2024-03-10 RX ADMIN — Medication 1000 UNITS: at 08:08

## 2024-03-10 RX ADMIN — GABAPENTIN 100 MG: 100 CAPSULE ORAL at 08:08

## 2024-03-10 RX ADMIN — ROSUVASTATIN CALCIUM 10 MG: 10 TABLET, COATED ORAL at 08:08

## 2024-03-10 RX ADMIN — LIDOCAINE 1 PATCH: 4 PATCH TOPICAL at 06:06

## 2024-03-10 RX ADMIN — METFORMIN HYDROCHLORIDE 500 MG: 500 TABLET ORAL at 08:08

## 2024-03-10 RX ADMIN — ACETAMINOPHEN 650 MG: 325 TABLET ORAL at 08:08

## 2024-03-10 ASSESSMENT — ENCOUNTER SYMPTOMS
COUGH: 0
BLURRED VISION: 0
DIARRHEA: 0
NERVOUS/ANXIOUS: 0
DIZZINESS: 0
FEVER: 0

## 2024-03-10 ASSESSMENT — PATIENT HEALTH QUESTIONNAIRE - PHQ9
1. LITTLE INTEREST OR PLEASURE IN DOING THINGS: NOT AT ALL
1. LITTLE INTEREST OR PLEASURE IN DOING THINGS: NOT AT ALL
SUM OF ALL RESPONSES TO PHQ9 QUESTIONS 1 AND 2: 0
2. FEELING DOWN, DEPRESSED, IRRITABLE, OR HOPELESS: NOT AT ALL
2. FEELING DOWN, DEPRESSED, IRRITABLE, OR HOPELESS: NOT AT ALL
SUM OF ALL RESPONSES TO PHQ9 QUESTIONS 1 AND 2: 0

## 2024-03-10 ASSESSMENT — PAIN DESCRIPTION - PAIN TYPE: TYPE: SURGICAL PAIN;ACUTE PAIN

## 2024-03-10 NOTE — PROGRESS NOTES
NURSING DAILY NOTE    Name: Mariana Jett   Date of Admission: 3/1/2024   Admitting Diagnosis: Closed compression fracture of L2 lumbar vertebra, initial encounter ([facility])  Attending Physician: Wendy Pride D.o.  Allergies: Patient has no known allergies.    Safety  Patient Assist  Min assist  Patient Precautions  Fall Risk, Spinal / Back Precautions   Precaution Comments     Bed Transfer Status  Standby Assist  Toilet Transfer Status   Supervised  Assistive Devices  Wheelchair  Oxygen  None - Room Air  Diet/Therapeutic Dining  Current Diet Order   Procedures    Diet Order Diet: Consistent CHO (Diabetic)     Pill Administration  whole  Agitated Behavioral Scale  16  ABS Level of Severity  No Agitation    Fall Risk  Has the patient had a fall this admission?   No  Hanh Brennan Fall Risk Scoring  17, HIGH RISK  Fall Risk Safety Measures  bed alarm, chair alarm, poor balance, and low vision/ hearing    Vitals  Temperature: 36.7 °C (98 °F)  Temp src: Oral  Pulse: 79  Respiration: 18  Blood Pressure : (!) 145/73  Blood Pressure MAP (Calculated): 97 MM HG  BP Location: Right, Upper Arm  Patient BP Position: Supine     Oxygen  Pulse Oximetry: 94 %  O2 (LPM): 0  O2 Delivery Device: None - Room Air    Bowel and Bladder  Last Bowel Movement  03/08/24  Stool Type  Type 4: Like a sausage or snake, smooth and soft  Bowel Device  Bathroom  Continent  Bladder: Continent void   Bowel: Continent movement  Bladder Function  Urine Void (mL):  (large)  Number of Times Voided: 1  Urinary Options: Yes  Urine Color: Unable To Evaluate  Genitourinary Assessment   Bladder Assessment (WDL):  Within Defined Limits  Hartman Catheter: Not Applicable  Urine Color: Unable To Evaluate  Bladder Device: Bathroom  Time Void: Yes  Bladder Scan: Post Void  $ Bladder Scan Results (mL): 1    Skin  Michael Score   18  Sensory Interventions   Bed Types: Standard/Trauma Mattress with Overlay  Skin  Preventative Measures: Waffle Overlay, Pillows in Use for Support / Positioning  Moisture Interventions         Pain  Pain Rating Scale  3 - Sometimes distracts me  Pain Location  Back, Generalized  Pain Location Orientation  Lower  Pain Interventions   Medication (see MAR)    ADLs    Bathing   Shower, Staff  Linen Change   Complete  Personal Hygiene  Perineal Care, Moist Priyanka Wipes  Chlorhexidine Bath      Oral Care  Brushed Teeth  Teeth/Dentures     Shave     Nutrition Percentage Eaten  Breakfast, Between 50-75% Consumed  Environmental Precautions  Treaded Slipper Socks on Patient, Personal Belongings, Wastebasket, Call Bell etc. in Easy Reach, Bed in Low Position  Patient Turns/Positioning  Patient Turns Self from Side to Side  Patient Turns Assistance/Tolerance     Bed Positions  Bed Controls On, Bed Locked  Head of Bed Elevated  Self regulated      Psychosocial/Neurologic Assessment  Psychosocial Assessment  Psychosocial (WDL):  Within Defined Limits  Neurologic Assessment  Neuro (WDL): Exceptions to WDL  Level of Consciousness: Alert  Orientation Level: Oriented to place, Oriented to situation, Oriented to person  Cognition: Appropriate judgement, Appropriate attention/concentration  Speech: Clear  Pupil Assesment: No  Muscle Strength Right Arm: Good Strength Against Gravity and Moderate Resistance  Muscle Strength Left Arm: Good Strength Against Gravity and Moderate Resistance  Muscle Strength Right Leg: Good Strength Against Gravity and Moderate Resistance  Muscle Strength Left Leg: Good Strength Against Gravity and Moderate Resistance  EENT (WDL):  WDL Except    Cardio/Pulmonary Assessment  Edema      Respiratory Breath Sounds  RUL Breath Sounds: Clear  RML Breath Sounds: Clear  RLL Breath Sounds: Clear, Diminished  IRAJ Breath Sounds: Clear  LLL Breath Sounds: Clear, Diminished  Cardiac Assessment   Cardiac (WDL):  WDL Except (HTN)

## 2024-03-10 NOTE — PROGRESS NOTES
NURSING DAILY NOTE    Name: Mariana Jett   Date of Admission: 3/1/2024   Admitting Diagnosis: Closed compression fracture of L2 lumbar vertebra, initial encounter ([facility])  Attending Physician: Wendy Pride D.o.  Allergies: Patient has no known allergies.    Safety  Patient Assist  Min assist  Patient Precautions  Fall Risk, Spinal / Back Precautions   Precaution Comments     Bed Transfer Status  Standby Assist  Toilet Transfer Status   Supervised  Assistive Devices  Wheelchair  Oxygen  None - Room Air  Diet/Therapeutic Dining  Current Diet Order   Procedures    Diet Order Diet: Consistent CHO (Diabetic)     Pill Administration  whole  Agitated Behavioral Scale  16  ABS Level of Severity  No Agitation    Fall Risk  Has the patient had a fall this admission?   No  Hanh Brennan Fall Risk Scoring  17, HIGH RISK  Fall Risk Safety Measures  bed alarm, chair alarm, poor balance, and low vision/ hearing    Vitals  Temperature: 36.9 °C (98.4 °F)  Temp src: Oral  Pulse: 65  Respiration: 18  Blood Pressure : 115/70  Blood Pressure MAP (Calculated): 85 MM HG  BP Location: Right, Upper Arm  Patient BP Position: Supine     Oxygen  Pulse Oximetry: 95 %  O2 (LPM): 0  O2 Delivery Device: None - Room Air    Bowel and Bladder  Last Bowel Movement  03/08/24  Stool Type  Type 4: Like a sausage or snake, smooth and soft  Bowel Device  Bathroom  Continent  Bladder: Did not void   Bowel: No movement  Bladder Function  Urine Void (mL):  (large)  Number of Times Voided: 1  Urinary Options: Yes  Urine Color: Unable To Evaluate  Genitourinary Assessment   Bladder Assessment (WDL):  Within Defined Limits  Hartman Catheter: Not Applicable  Urine Color: Unable To Evaluate  Bladder Device: Bathroom  Time Void: Yes  Bladder Scan: Post Void  $ Bladder Scan Results (mL): 1    Skin  Michael Score   18  Sensory Interventions   Bed Types: Standard/Trauma Mattress with Overlay  Skin Preventative  Measures: Pillows in Use for Support / Positioning, Waffle Overlay  Moisture Interventions         Pain  Pain Rating Scale  3 - Sometimes distracts me  Pain Location  Back, Generalized  Pain Location Orientation  Lower  Pain Interventions   Medication (see MAR)    ADLs    Bathing   Shower, Staff  Linen Change   Complete  Personal Hygiene  Perineal Care, Moist Priyanka Wipes  Chlorhexidine Bath      Oral Care  Brushed Teeth  Teeth/Dentures     Shave     Nutrition Percentage Eaten  Breakfast, Between 50-75% Consumed  Environmental Precautions  Treaded Slipper Socks on Patient, Personal Belongings, Wastebasket, Call Bell etc. in Easy Reach, Bed in Low Position  Patient Turns/Positioning  Patient Turns Self from Side to Side  Patient Turns Assistance/Tolerance     Bed Positions  Bed Controls On, Bed Locked  Head of Bed Elevated  Self regulated      Psychosocial/Neurologic Assessment  Psychosocial Assessment  Psychosocial (WDL):  Within Defined Limits  Neurologic Assessment  Neuro (WDL): Exceptions to WDL  Level of Consciousness: Alert  Orientation Level: Oriented to place, Oriented to situation, Oriented to person  Cognition: Appropriate judgement, Appropriate attention/concentration  Speech: Clear  Pupil Assesment: No  Muscle Strength Right Arm: Good Strength Against Gravity and Moderate Resistance  Muscle Strength Left Arm: Good Strength Against Gravity and Moderate Resistance  Muscle Strength Right Leg: Good Strength Against Gravity and Moderate Resistance  Muscle Strength Left Leg: Good Strength Against Gravity and Moderate Resistance  EENT (WDL):  WDL Except    Cardio/Pulmonary Assessment  Edema      Respiratory Breath Sounds  RUL Breath Sounds: Clear  RML Breath Sounds: Clear  RLL Breath Sounds: Clear, Diminished  IRAJ Breath Sounds: Clear  LLL Breath Sounds: Clear, Diminished  Cardiac Assessment   Cardiac (WDL):  WDL Except (HTN)

## 2024-03-10 NOTE — THERAPY
Missed Therapy    Patient Name: Mariana Jett  Age:  89 y.o., Sex:  female  Medical Record #: [id number]  Today's Date: 3/10/2024    Discussed 15min missed therapy with patient, dtr, ; patient prepared for discharge        Postpartum Progress Note       Felicia Last Blocker: Not Applicable       ASSESSMENT/PLAN:  Felicia Last 31 Oumou Sanon, DO

## 2024-03-10 NOTE — PROGRESS NOTES
NURSING DAILY NOTE    Name: Mariana Jett   Date of Admission: 3/1/2024   Admitting Diagnosis: Closed compression fracture of L2 lumbar vertebra, initial encounter ([facility])  Attending Physician: Wendy Pride D.o.  Allergies: Patient has no known allergies.    Safety  Patient Assist  Min assist  Patient Precautions  Fall Risk, Spinal / Back Precautions   Precaution Comments     Bed Transfer Status  Standby Assist  Toilet Transfer Status   Supervised  Assistive Devices  Rails, Wheelchair  Oxygen  None - Room Air  Diet/Therapeutic Dining  Current Diet Order   Procedures    Diet Order Diet: Consistent CHO (Diabetic)     Pill Administration  whole  Agitated Behavioral Scale  16  ABS Level of Severity  No Agitation    Fall Risk  Has the patient had a fall this admission?   No  Hanh Brennan Fall Risk Scoring  17, HIGH RISK  Fall Risk Safety Measures  bed alarm, chair alarm, poor balance, and low vision/ hearing    Vitals  Temperature: 36.4 °C (97.5 °F)  Temp src: Oral  Pulse: 70  Respiration: 18  Blood Pressure : 118/75  Blood Pressure MAP (Calculated): 89 MM HG  BP Location: Right, Upper Arm  Patient BP Position: Supine     Oxygen  Pulse Oximetry: 95 %  O2 (LPM): 0  O2 Delivery Device: None - Room Air    Bowel and Bladder  Last Bowel Movement  03/08/24  Stool Type  Type 4: Like a sausage or snake, smooth and soft  Bowel Device  Bathroom  Continent  Bladder: Continent void   Bowel: Continent movement  Bladder Function  Urine Void (mL):  (large)  Number of Times Voided: 1  Urinary Options: Yes  Urine Color: Yellow  Genitourinary Assessment   Bladder Assessment (WDL):  Within Defined Limits  Hartman Catheter: Not Applicable  Urine Color: Yellow  Bladder Device: Bathroom  Time Void: Yes  Bladder Scan: Post Void  $ Bladder Scan Results (mL): 1    Skin  Michael Score   18  Sensory Interventions   Bed Types: Standard/Trauma Mattress  Skin Preventative Measures: Pillows in Use  for Support / Positioning, Waffle Overlay  Moisture Interventions         Pain  Pain Rating Scale  0 - No Pain  Pain Location  Back, Generalized  Pain Location Orientation  Lower  Pain Interventions   Declines    ADLs    Bathing   Shower, Staff  Linen Change   Complete  Personal Hygiene  Perineal Care, Moist Priyanka Wipes  Chlorhexidine Bath      Oral Care  Brushed Teeth  Teeth/Dentures     Shave     Nutrition Percentage Eaten  Breakfast, Between 50-75% Consumed  Environmental Precautions  Treaded Slipper Socks on Patient, Personal Belongings, Wastebasket, Call Bell etc. in Easy Reach, Bed in Low Position  Patient Turns/Positioning  Patient Turns Self from Side to Side  Patient Turns Assistance/Tolerance     Bed Positions  Bed Controls On, Bed Locked  Head of Bed Elevated  Self regulated      Psychosocial/Neurologic Assessment  Psychosocial Assessment  Psychosocial (WDL):  Within Defined Limits  Neurologic Assessment  Neuro (WDL): Exceptions to WDL  Level of Consciousness: Alert  Orientation Level: Oriented to place, Oriented to situation, Oriented to person  Cognition: Appropriate judgement, Appropriate attention/concentration  Speech: Clear  Pupil Assesment: No  Muscle Strength Right Arm: Good Strength Against Gravity and Moderate Resistance  Muscle Strength Left Arm: Good Strength Against Gravity and Moderate Resistance  Muscle Strength Right Leg: Good Strength Against Gravity and Moderate Resistance  Muscle Strength Left Leg: Good Strength Against Gravity and Moderate Resistance  EENT (WDL):  WDL Except    Cardio/Pulmonary Assessment  Edema      Respiratory Breath Sounds  RUL Breath Sounds: Clear  RML Breath Sounds: Clear  RLL Breath Sounds: Clear, Diminished  IRAJ Breath Sounds: Clear  LLL Breath Sounds: Clear, Diminished  Cardiac Assessment   Cardiac (WDL):  WDL Except (HTN)

## 2024-03-10 NOTE — CARE PLAN
"  Problem: Fall Risk - Rehab  Goal: Patient will remain free from falls  Note: Hanh Brennan Fall risk Assessment Score: 17    High fall risk Interventions   - Alarming seatbelt  - Wander guard  - Bed and strip alarm   - Yellow sign by the door   - Yellow wrist band \"Fall risk\"  - Room near to the nurse station  - Do not leave patient unattended in the bathroom  - Fall risk education provided    Problem: Discharge Barriers/Planning  Goal: Patient's continuum of care needs are met  Note: For discharge to Assisted Living 3/10/24. Close compression fracture of L2 Lumbar vertebra. Elevated alkaline phosphatase level. HTN, DM II, Dylipidemia. Diabetic, thin liquids diet. Continent of bladder and bowel.            The patient is Watcher - Medium risk of patient condition declining or worsening    Shift Goals  Clinical Goals: Safety  Patient Goals: Safety      "

## 2024-03-10 NOTE — CARE PLAN
Problem: Knowledge Deficit - Standard  Goal: Patient and family/care givers will demonstrate understanding of plan of care, disease process/condition, diagnostic tests and medications  Outcome: Met     Problem: Fall Risk - Rehab  Goal: Patient will remain free from falls  Outcome: Met     Problem: Discharge Barriers/Planning  Goal: Patient's continuum of care needs are met  Outcome: Met     Problem: Psychosocial  Goal: Patient's level of anxiety will decrease  Outcome: Met  Goal: Patient's ability to verbalize feelings about condition will improve  Outcome: Met  Goal: Patient's ability to re-evaluate and adapt role responsibilities will improve  Outcome: Met  Goal: Patient and family will demonstrate ability to cope with life altering diagnosis and/or procedure  Outcome: Met  Goal: Spiritual and cultural needs incorporated into hospitalization  Outcome: Met     Problem: Respiratory  Goal: Patient will understand use and administration of respiratory medications to improve respiratory function  Outcome: Met     Problem: Risk for Aspiration  Goal: Patient's risk for aspiration will be absent or decrease  Outcome: Met     Problem: Bladder / Voiding  Goal: Patient will establish and maintain regular urinary output  Outcome: Met  Goal: Patient will establish and maintain bladder regimen  Outcome: Met     Problem: Neurogenic Bladder  Goal: Patient will demonstrate ability to take care of indwelling catheter  Outcome: Met  Goal: Patient will demonstrate self-cath technique using clean technique and care of the catheter  Outcome: Met     Problem: Bowel Elimination  Goal: Patient will participate in bowel management program  Outcome: Met     Problem: Neurogenic Bowel  Goal: Patient will perform adequate hygiene with incontinent episodes  Outcome: Met  Goal: Patient will verbalize signs and symptoms of constipation and how to prevent/alleviate  Outcome: Met  Goal: Patient will demonstrates ability to complete digital  stimulation technique  Outcome: Met     Problem: Skin Integrity  Goal: Patient's skin integrity will be maintained or improve  Outcome: Met     Problem: Nutrition  Goal: Patient's nutritional and fluid intake will be adequate or improve  Outcome: Met  Goal: Patient will display progressive weight gain toward goal have adequate food and fluid intake  Outcome: Met  Goal: Patient will demonstrate ability to administer respiratory medications  Outcome: Met     Problem: Self Care  Goal: Patient will have the ability to perform ADLs independently or with assistance  Outcome: Met     Problem: Mobility  Goal: Patient's capacity to carry out activities will improve  Outcome: Met     Problem: Infection  Goal: Patient will remain free from infection  Outcome: Met     Problem: Diabetes Management  Goal: Patient's ability to maintain appropriate glucose levels will be maintained or improve  Outcome: Met     Problem: Pain - Standard  Goal: Alleviation of pain or a reduction in pain to the patient’s comfort goal  Outcome: Met   The patient is Stable - Low risk of patient condition declining or worsening    Shift Goals  Clinical Goals: Safety  Patient Goals: Safety

## 2024-03-10 NOTE — PROGRESS NOTES
Hospital Medicine Daily Progress Note        Chief Complaint  Hypertension  Diabetes    Interval Problem Update  No significant events overnight.    Review of Systems  Review of Systems   Constitutional:  Negative for fever.   Eyes:  Negative for blurred vision.   Respiratory:  Negative for cough.    Cardiovascular:  Negative for chest pain.   Gastrointestinal:  Negative for diarrhea.   Musculoskeletal:  Negative for joint pain.   Neurological:  Negative for dizziness.   Psychiatric/Behavioral:  The patient is not nervous/anxious.         Physical Exam  Temp:  [36.4 °C (97.5 °F)-36.9 °C (98.4 °F)] 36.7 °C (98 °F)  Pulse:  [65-79] 79  Resp:  [18] 18  BP: (115-145)/(70-75) 145/73  SpO2:  [94 %-95 %] 94 %    Physical Exam  Vitals and nursing note reviewed.   Constitutional:       Appearance: She is not diaphoretic.   HENT:      Mouth/Throat:      Pharynx: No oropharyngeal exudate or posterior oropharyngeal erythema.   Eyes:      Extraocular Movements: Extraocular movements intact.   Neck:      Vascular: No carotid bruit or JVD.   Cardiovascular:      Rate and Rhythm: Normal rate and regular rhythm.      Heart sounds: Normal heart sounds.   Pulmonary:      Effort: Pulmonary effort is normal.      Breath sounds: No wheezing or rales.   Abdominal:      General: There is no distension.      Palpations: Abdomen is soft.      Tenderness: There is no abdominal tenderness.   Musculoskeletal:      Right lower leg: No edema.      Left lower leg: No edema.   Skin:     General: Skin is warm and dry.   Neurological:      Mental Status: She is alert and oriented to person, place, and time.   Psychiatric:         Mood and Affect: Mood normal.         Behavior: Behavior normal.         Fluids    Intake/Output Summary (Last 24 hours) at 3/10/2024 1036  Last data filed at 3/9/2024 2002  Gross per 24 hour   Intake 120 ml   Output --   Net 120 ml       Laboratory                            Assessment/Plan  Lumbar compression fracture,  closed, initial encounter (HCC)- (present on admission)  Assessment & Plan  2nd to GLF  S/P L2 kyphoplasty (2/29)    Dyslipidemia- (present on admission)  Assessment & Plan  Cont Crestor    Diabetes mellitus type II, controlled ([facility])- (present on admission)  Assessment & Plan  Hba1c: 6.4  Cont Metformin  Off accuchecks  Note: home meds include Metformin 500 mg daily usually, but pt reports she takes twice daily as needed if FSBS >150    Hypertension- (present on admission)  Assessment & Plan  BP labile  Cont Norvasc  Cont Avapro  Cont to monitor

## 2024-03-10 NOTE — THERAPY
Physical Therapy   Daily Treatment     Patient Name: Mariana Jett  Age:  89 y.o., Sex:  female  Medical Record #: [id number]  Today's Date: 3/10/2024     Precautions  Precautions: Fall Risk, Spinal / Back Precautions     Subjective    Patient and dtr feel confident and prepared for discharge     Objective       03/10/24 1101   PT Charge Group   PT Therapeutic Activities (Units) 1   PT Total Time Spent   PT Individual Total Time Spent (Mins) 15   Precautions   Precautions Fall Risk;Spinal / Back Precautions    Interdisciplinary Plan of Care Collaboration   IDT Collaboration with  Family / Caregiver   Collaboration Comments discussed discharge plan- recommend CGA on stairs with FWW, dtr declined demonstration, SPV for indoor mobility, dtr declined demonstration         Assessment    Declined demonstration of functional mobility     Strengths: Able to follow instructions, Alert and oriented, Manages pain appropriately, Motivated for self care and independence, Pleasant and cooperative, Supportive family, Willingly participates in therapeutic activities  Barriers: Decreased endurance, Fatigue, Impaired activity tolerance, Impaired balance, Pain, Generalized weakness    Plan    Discharge patient     DME       Passport items to be completed:  All complete    Physical Therapy Problems (Active)       Problem: Mobility       Dates: Start:  03/02/24         Goal: STG-Within one week, patient will ascend and descend four to six stairs with rails and CGA.        Dates: Start:  03/02/24               Problem: PT-Long Term Goals       Dates: Start:  03/02/24         Goal: LTG-By discharge, patient will ambulate at least 150 feet with LRAD and supervision.        Dates: Start:  03/02/24            Goal: LTG-By discharge, patient will transfer one surface to another with LRAD and supervision.        Dates: Start:  03/02/24            Goal: LTG-By discharge, patient will ambulate up/down 4-6 stairs with B rails and SBA.        Dates:  Start:  03/02/24            Goal: LTG-By discharge, patient will transfer in/out of a car with LRAD and SBA.        Dates: Start:  03/02/24

## 2024-03-10 NOTE — PROGRESS NOTES
Patient discharged to home per order.  Discharge instructions reviewed with patient and daughter; they verbalize understanding and signed copies placed in chart.  Patient has all belongings; signed copy of form in chart.  Patient left facility at 1200 via car accompanied by rehab staff and daughter.  Have enjoyed working with this pleasant patient.  Patient discharged to home per order.

## 2024-03-11 LAB
GAMMA INTERFERON BACKGROUND BLD IA-ACNC: 0.05 IU/ML
M TB IFN-G BLD-IMP: NEGATIVE
M TB IFN-G CD4+ BCKGRND COR BLD-ACNC: 0 IU/ML
MITOGEN IGNF BCKGRD COR BLD-ACNC: >10 IU/ML
QFT TB2 - NIL TBQ2: 0 IU/ML

## 2024-04-03 PROBLEM — M81.0 AGE-RELATED OSTEOPOROSIS WITHOUT CURRENT PATHOLOGICAL FRACTURE: Status: ACTIVE | Noted: 2024-04-03

## 2024-04-05 PROBLEM — E78.5 TYPE 2 DIABETES MELLITUS WITH HYPERLIPIDEMIA (HCC): Status: ACTIVE | Noted: 2024-02-25

## 2024-04-05 PROBLEM — J43.8 OTHER EMPHYSEMA (HCC): Status: ACTIVE | Noted: 2024-04-05

## 2024-04-05 PROBLEM — E55.9 VITAMIN D DEFICIENCY: Status: ACTIVE | Noted: 2024-04-05

## 2024-04-05 PROBLEM — M80.08XS AGE-RELATED OSTEOPOROSIS WITH CURRENT PATHOLOGICAL FRACTURE, VERTEBRA(E), SEQUELA: Status: ACTIVE | Noted: 2024-04-05

## 2024-04-05 PROBLEM — S32.020S COMPRESSION FRACTURE OF L2 LUMBAR VERTEBRA, SEQUELA: Status: ACTIVE | Noted: 2024-02-25

## 2024-04-05 PROBLEM — Z91.81 AT RISK FOR FALLS: Status: ACTIVE | Noted: 2024-04-05

## 2024-04-05 PROBLEM — E11.40 TYPE 2 DIABETES MELLITUS WITH DIABETIC NEUROPATHY (HCC): Status: ACTIVE | Noted: 2024-04-05

## 2024-04-05 PROBLEM — E11.69 TYPE 2 DIABETES MELLITUS WITH HYPERLIPIDEMIA (HCC): Status: ACTIVE | Noted: 2024-02-25

## 2024-05-14 ENCOUNTER — HOSPITAL ENCOUNTER (OUTPATIENT)
Facility: MEDICAL CENTER | Age: 89
End: 2024-05-14
Attending: PHYSICIAN ASSISTANT
Payer: MEDICARE

## 2024-05-14 DIAGNOSIS — R39.9 UTI SYMPTOMS: ICD-10-CM

## 2024-05-14 LAB
APPEARANCE UR: ABNORMAL
BACTERIA #/AREA URNS HPF: ABNORMAL /HPF
BILIRUB UR QL STRIP.AUTO: NEGATIVE
CAOX CRY #/AREA URNS HPF: ABNORMAL /HPF
COLOR UR: YELLOW
EPI CELLS #/AREA URNS HPF: ABNORMAL /HPF
GLUCOSE UR STRIP.AUTO-MCNC: NEGATIVE MG/DL
HYALINE CASTS #/AREA URNS LPF: ABNORMAL /LPF
KETONES UR STRIP.AUTO-MCNC: ABNORMAL MG/DL
LEUKOCYTE ESTERASE UR QL STRIP.AUTO: ABNORMAL
MICRO URNS: ABNORMAL
MUCOUS THREADS #/AREA URNS HPF: ABNORMAL /HPF
NITRITE UR QL STRIP.AUTO: POSITIVE
PH UR STRIP.AUTO: 6 [PH] (ref 5–8)
PROT UR QL STRIP: NEGATIVE MG/DL
RBC # URNS HPF: ABNORMAL /HPF
RBC UR QL AUTO: NEGATIVE
SP GR UR STRIP.AUTO: 1.02
UROBILINOGEN UR STRIP.AUTO-MCNC: 1 MG/DL
WBC #/AREA URNS HPF: ABNORMAL /HPF

## 2024-05-17 LAB
BACTERIA UR CULT: ABNORMAL
BACTERIA UR CULT: ABNORMAL
SIGNIFICANT IND 70042: ABNORMAL
SITE SITE: ABNORMAL
SOURCE SOURCE: ABNORMAL

## 2024-05-22 PROBLEM — R15.9 FECAL INCONTINENCE: Status: ACTIVE | Noted: 2024-05-22

## 2024-05-24 PROBLEM — M85.832 OSTEOPENIA OF LEFT FOREARM: Status: ACTIVE | Noted: 2024-05-24

## 2024-07-10 PROBLEM — M81.0 AGE-RELATED OSTEOPOROSIS WITHOUT CURRENT PATHOLOGICAL FRACTURE: Status: RESOLVED | Noted: 2024-04-03 | Resolved: 2024-07-10

## 2024-07-10 PROBLEM — R32 URINARY INCONTINENCE: Status: ACTIVE | Noted: 2024-07-10

## 2024-07-10 PROBLEM — D75.839 THROMBOCYTOSIS: Status: RESOLVED | Noted: 2024-02-25 | Resolved: 2024-07-10

## 2024-08-21 PROBLEM — R80.9 MICROALBUMINURIA: Status: ACTIVE | Noted: 2024-08-21

## 2024-08-21 PROBLEM — D72.829 LEUKOCYTOSIS: Status: RESOLVED | Noted: 2024-02-25 | Resolved: 2024-08-21

## 2024-08-21 PROBLEM — E87.1 HYPONATREMIA: Status: RESOLVED | Noted: 2024-02-25 | Resolved: 2024-08-21

## 2024-09-21 ENCOUNTER — OFFICE VISIT (OUTPATIENT)
Dept: URGENT CARE | Facility: CLINIC | Age: 89
End: 2024-09-21
Payer: MEDICARE

## 2024-09-21 VITALS
SYSTOLIC BLOOD PRESSURE: 148 MMHG | DIASTOLIC BLOOD PRESSURE: 60 MMHG | BODY MASS INDEX: 21.95 KG/M2 | HEART RATE: 83 BPM | HEIGHT: 60 IN | TEMPERATURE: 98.1 F | RESPIRATION RATE: 15 BRPM | OXYGEN SATURATION: 95 % | WEIGHT: 111.8 LBS

## 2024-09-21 DIAGNOSIS — S51.011A SKIN TEAR OF RIGHT ELBOW WITHOUT COMPLICATION, INITIAL ENCOUNTER: ICD-10-CM

## 2024-09-21 PROCEDURE — 3078F DIAST BP <80 MM HG: CPT

## 2024-09-21 PROCEDURE — 3077F SYST BP >= 140 MM HG: CPT

## 2024-09-21 PROCEDURE — 99204 OFFICE O/P NEW MOD 45 MIN: CPT

## 2024-09-21 ASSESSMENT — FIBROSIS 4 INDEX: FIB4 SCORE: 1.26

## 2024-09-21 ASSESSMENT — ENCOUNTER SYMPTOMS
LOSS OF CONSCIOUSNESS: 0
DIZZINESS: 0

## 2024-09-21 NOTE — PROGRESS NOTES
CHIEF COMPLAINT  Chief Complaint   Patient presents with    Fall     Fell an hour ago and has a skin tear on the right elbow      Subjective:   Mariana Jett is a 89 y.o. female who presents to urgent care with concerns for a skin tear to her right elbow.  Patient reports that she was attempting to close a door when she lost her balance and fell landing on her right elbow.  She denies any numbness or tingling.  No loss of sensation or strength.  No pain or deformity to joint.  Denies any head injury or LOC.  She does report today with concern for skin tear to right elbow.       Review of Systems   Neurological:  Negative for dizziness and loss of consciousness.       PAST MEDICAL HISTORY  Patient Active Problem List    Diagnosis Date Noted    Microalbuminuria 08/21/2024    Urinary incontinence 07/10/2024    Osteopenia of left forearm 05/24/2024    Fecal incontinence 05/22/2024    Type 2 diabetes mellitus with diabetic neuropathy ([facility]) 04/05/2024    Vitamin D deficiency 04/05/2024    Age-related osteoporosis with current pathological fracture, vertebra(e), sequela 04/05/2024    At risk for falls 04/05/2024    Other emphysema ([facility]) 04/05/2024    Fall at home, initial encounter 02/29/2024    UTI (urinary tract infection) 02/27/2024    Advance care planning 02/27/2024    Lumbar compression fracture, closed, initial encounter ([facility]) 02/27/2024    Compression fracture of L2 lumbar vertebra, sequela 02/25/2024    Elevated alkaline phosphatase level 02/25/2024    Hypertension 02/25/2024    Type 2 diabetes mellitus with hyperlipidemia ([facility]) 02/25/2024    Dyslipidemia 02/25/2024    DNR (do not resuscitate) 02/25/2024       SURGICAL HISTORY   has a past surgical history that includes tonsillectomy; cholecystectomy (N/A); cataract extraction with iol; and kyphoplasty.    ALLERGIES  No Known Allergies    CURRENT MEDICATIONS  Home Medications       Reviewed by Kailyn Parkinson on 09/21/24 at 1334  Med List Status: <None>      Medication Last Dose Status   Acetaminophen 500 MG Cap PRN Active   amLODIPine (NORVASC) 5 MG Tab Taking Active   Cholecalciferol (VITAMIN D3) 125 MCG (5000 UT) Cap Taking Active   Cranberry 600 MG Tab Taking Active   diclofenac sodium (VOLTAREN) 1 % Gel PRN Active   hydrocortisone 1 % Ointment PRN Active   irbesartan (AVAPRO) 300 MG Tab Taking Active   loperamide (IMODIUM) 2 MG Cap PRN Active   metFORMIN ER (GLUCOPHAGE XR) 500 MG TABLET SR 24 HR Taking Active   Multiple Vitamins-Minerals (PRESERVISION AREDS 2 PO) Not Taking Active   rosuvastatin (CRESTOR) 10 MG Tab  Active                    SOCIAL HISTORY  Social History     Tobacco Use    Smoking status: Former     Types: Cigarettes     Start date: 1952    Smokeless tobacco: Never   Vaping Use    Vaping status: Never Used   Substance and Sexual Activity    Alcohol use: Yes     Comment: 1 per day    Drug use: Never    Sexual activity: Not on file       FAMILY HISTORY  History reviewed. No pertinent family history.      Medications, Allergies, and current problem list reviewed today in Epic.     Objective:     BP (!) 148/60 (BP Location: Left arm, Patient Position: Sitting, BP Cuff Size: Adult)   Pulse 83   Temp 36.7 °C (98.1 °F) (Temporal)   Resp 15   Ht 1.524 m (5')   Wt 50.7 kg (111 lb 12.8 oz)   SpO2 95%     Physical Exam  Vitals reviewed.   Constitutional:       General: She is not in acute distress.     Appearance: Normal appearance. She is not ill-appearing or toxic-appearing.   HENT:      Head: Normocephalic.   Pulmonary:      Effort: Pulmonary effort is normal.   Musculoskeletal:      Right upper arm: No swelling, edema, deformity, tenderness or bony tenderness.      Right elbow: No swelling or deformity. Normal range of motion. No tenderness.      Right forearm: No swelling, edema, deformity, tenderness or bony tenderness.   Skin:     General: Skin is warm.      Capillary Refill: Capillary refill takes less than 2 seconds.      Findings: Signs  of injury present.             Comments: 5 cm skin tear to right elbow    Neurological:      General: No focal deficit present.      Mental Status: She is alert.   Psychiatric:         Mood and Affect: Mood normal.         Assessment/Plan:     Diagnosis and associated orders:     1. Skin tear of right elbow without complication, initial encounter  Referral to Wound Clinic         Comments/MDM:     Patient reports urgent care after sustaining a fall to her right elbow.  She reports a skin tear today.  Physical exam reveals a 5 cm skin tear to right elbow.  No deformity appreciated to right upper arm.  No bony tenderness.  Normal ROM.  Skin tear thoroughly cleansed with Hibiclens.  Xeroform dressing applied with nonadherent gauze.  Paper tape used to secure gauze.  Patient counseled on appropriate management and care of wound.  Referral placed to wound clinic for further evaluation and management.  Return with any concerning symptoms.          Differential diagnosis, natural history, supportive care, and indications for immediate follow-up discussed.    Advised the patient to follow-up with the primary care physician for recheck, reevaluation, and consideration of further management.    Please note that this dictation was created using voice recognition software. I have made a reasonable attempt to correct obvious errors, but I expect that there are errors of grammar and possibly content that I did not discover before finalizing the note.    This note was electronically signed by DIPAK Chambers

## 2024-10-02 PROBLEM — S51.011A SKIN TEAR OF RIGHT ELBOW WITHOUT COMPLICATION: Status: ACTIVE | Noted: 2024-10-02

## 2024-10-06 ENCOUNTER — HOSPITAL ENCOUNTER (EMERGENCY)
Facility: MEDICAL CENTER | Age: 89
End: 2024-10-06
Attending: EMERGENCY MEDICINE
Payer: MEDICARE

## 2024-10-06 ENCOUNTER — APPOINTMENT (OUTPATIENT)
Dept: RADIOLOGY | Facility: MEDICAL CENTER | Age: 89
End: 2024-10-06
Attending: EMERGENCY MEDICINE
Payer: MEDICARE

## 2024-10-06 VITALS
SYSTOLIC BLOOD PRESSURE: 198 MMHG | TEMPERATURE: 97.8 F | RESPIRATION RATE: 18 BRPM | DIASTOLIC BLOOD PRESSURE: 83 MMHG | BODY MASS INDEX: 21.01 KG/M2 | OXYGEN SATURATION: 95 % | HEIGHT: 60 IN | HEART RATE: 81 BPM | WEIGHT: 107 LBS
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
## 2024-10-06 DIAGNOSIS — S82.891A CLOSED FRACTURE OF RIGHT ANKLE, INITIAL ENCOUNTER: ICD-10-CM

## 2024-10-06 PROCEDURE — 99284 EMERGENCY DEPT VISIT MOD MDM: CPT

## 2024-10-06 PROCEDURE — 73610 X-RAY EXAM OF ANKLE: CPT | Mod: RT

## 2024-10-06 ASSESSMENT — FIBROSIS 4 INDEX: FIB4 SCORE: 1.26

## 2024-10-06 ASSESSMENT — PAIN DESCRIPTION - PAIN TYPE: TYPE: ACUTE PAIN

## 2024-10-08 PROBLEM — S82.891D CLOSED FRACTURE OF RIGHT ANKLE WITH ROUTINE HEALING: Status: ACTIVE | Noted: 2024-10-08

## 2024-10-08 PROBLEM — R29.6 MULTIPLE FALLS: Status: ACTIVE | Noted: 2024-02-29

## 2024-10-16 PROBLEM — K62.5 RECTAL BLEEDING: Status: ACTIVE | Noted: 2024-10-16

## 2024-10-16 PROBLEM — K59.01 SLOW TRANSIT CONSTIPATION: Status: ACTIVE | Noted: 2024-10-16

## 2024-10-16 PROBLEM — K64.4 EXTERNAL HEMORRHOID, BLEEDING: Status: ACTIVE | Noted: 2024-10-16

## 2024-10-16 PROBLEM — K64.9 BLEEDING HEMORRHOIDS: Status: ACTIVE | Noted: 2024-10-16

## 2024-12-18 PROBLEM — J06.9 URI (UPPER RESPIRATORY INFECTION): Status: ACTIVE | Noted: 2024-12-18

## 2024-12-18 PROBLEM — J20.9 ACUTE BRONCHITIS: Status: ACTIVE | Noted: 2024-12-18

## 2025-01-09 PROBLEM — R29.898 LOWER EXTREMITY WEAKNESS: Status: ACTIVE | Noted: 2025-01-09

## 2025-01-09 PROBLEM — Z99.3 DEPENDENCE ON WHEELCHAIR: Status: ACTIVE | Noted: 2025-01-09

## 2025-01-28 PROBLEM — Z74.09 IMPAIRED MOBILITY: Status: ACTIVE | Noted: 2025-01-28

## 2025-02-18 PROBLEM — R39.9 URINARY TRACT INFECTION SYMPTOMS: Status: ACTIVE | Noted: 2025-02-18

## 2025-02-21 ENCOUNTER — HOSPITAL ENCOUNTER (OUTPATIENT)
Facility: MEDICAL CENTER | Age: OVER 89
End: 2025-02-21
Payer: MEDICARE

## 2025-02-21 DIAGNOSIS — R39.9 UTI SYMPTOMS: ICD-10-CM

## 2025-02-21 PROCEDURE — 81001 URINALYSIS AUTO W/SCOPE: CPT

## 2025-02-21 PROCEDURE — 87086 URINE CULTURE/COLONY COUNT: CPT

## 2025-02-21 PROCEDURE — 87077 CULTURE AEROBIC IDENTIFY: CPT

## 2025-02-21 PROCEDURE — 87186 SC STD MICRODIL/AGAR DIL: CPT

## 2025-02-22 LAB
APPEARANCE UR: ABNORMAL
BACTERIA #/AREA URNS HPF: ABNORMAL /HPF
BILIRUB UR QL STRIP.AUTO: NEGATIVE
CA OXALATE CRYSTAL  1765: PRESENT /HPF
CASTS URNS QL MICRO: ABNORMAL /LPF (ref 0–2)
COLOR UR: YELLOW
EPITHELIAL CELLS 1715: ABNORMAL /HPF (ref 0–5)
GLUCOSE UR STRIP.AUTO-MCNC: 100 MG/DL
KETONES UR STRIP.AUTO-MCNC: NEGATIVE MG/DL
LEUKOCYTE ESTERASE UR QL STRIP.AUTO: ABNORMAL
MICRO URNS: ABNORMAL
NITRITE UR QL STRIP.AUTO: POSITIVE
PH UR STRIP.AUTO: 7 [PH] (ref 5–8)
PROT UR QL STRIP: NEGATIVE MG/DL
RBC # URNS HPF: ABNORMAL /HPF (ref 0–2)
RBC UR QL AUTO: NEGATIVE
SP GR UR STRIP.AUTO: 1.02
UROBILINOGEN UR STRIP.AUTO-MCNC: 1 EU/DL
WBC #/AREA URNS HPF: ABNORMAL /HPF

## 2025-04-15 PROBLEM — Z91.81 HISTORY OF RECENT FALL: Status: ACTIVE | Noted: 2025-04-15

## 2025-04-15 PROBLEM — R07.81 RIB PAIN ON RIGHT SIDE: Status: ACTIVE | Noted: 2025-04-15
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
## 2025-05-20 PROBLEM — R05.1 ACUTE COUGH: Status: ACTIVE | Noted: 2025-05-20

## 2025-05-20 PROBLEM — F10.90 ALCOHOL USE: Status: ACTIVE | Noted: 2025-05-20

## 2025-08-01 ENCOUNTER — HOSPITAL ENCOUNTER (OUTPATIENT)
Dept: LAB | Facility: MEDICAL CENTER | Age: OVER 89
End: 2025-08-01
Payer: MEDICARE

## 2025-08-01 DIAGNOSIS — E78.5 TYPE 2 DIABETES MELLITUS WITH HYPERLIPIDEMIA (HCC): ICD-10-CM

## 2025-08-01 DIAGNOSIS — M80.08XS AGE-RELATED OSTEOPOROSIS WITH CURRENT PATHOLOGICAL FRACTURE, VERTEBRA(E), SEQUELA: ICD-10-CM

## 2025-08-01 DIAGNOSIS — I10 PRIMARY HYPERTENSION: ICD-10-CM

## 2025-08-01 DIAGNOSIS — E55.9 VITAMIN D DEFICIENCY: ICD-10-CM

## 2025-08-01 DIAGNOSIS — E78.5 DYSLIPIDEMIA: ICD-10-CM

## 2025-08-01 DIAGNOSIS — E11.69 TYPE 2 DIABETES MELLITUS WITH HYPERLIPIDEMIA (HCC): ICD-10-CM

## 2025-08-01 LAB
25(OH)D3 SERPL-MCNC: 80 NG/ML (ref 30–100)
ALBUMIN SERPL BCP-MCNC: 4.8 G/DL (ref 3.2–4.9)
ALBUMIN/GLOB SERPL: 1.8 G/DL
ALP SERPL-CCNC: 110 U/L (ref 30–99)
ALT SERPL-CCNC: 15 U/L (ref 2–50)
ANION GAP SERPL CALC-SCNC: 15 MMOL/L (ref 7–16)
AST SERPL-CCNC: 20 U/L (ref 12–45)
BASOPHILS # BLD AUTO: 0.4 % (ref 0–1.8)
BASOPHILS # BLD: 0.03 K/UL (ref 0–0.12)
BILIRUB SERPL-MCNC: 0.6 MG/DL (ref 0.1–1.5)
BUN SERPL-MCNC: 19 MG/DL (ref 8–22)
CALCIUM ALBUM COR SERPL-MCNC: 9 MG/DL (ref 8.5–10.5)
CALCIUM SERPL-MCNC: 9.6 MG/DL (ref 8.5–10.5)
CHLORIDE SERPL-SCNC: 100 MMOL/L (ref 96–112)
CHOLEST SERPL-MCNC: 206 MG/DL (ref 100–199)
CO2 SERPL-SCNC: 24 MMOL/L (ref 20–33)
CREAT SERPL-MCNC: 0.58 MG/DL (ref 0.5–1.4)
EOSINOPHIL # BLD AUTO: 0.09 K/UL (ref 0–0.51)
EOSINOPHIL NFR BLD: 1.1 % (ref 0–6.9)
ERYTHROCYTE [DISTWIDTH] IN BLOOD BY AUTOMATED COUNT: 43.4 FL (ref 35.9–50)
EST. AVERAGE GLUCOSE BLD GHB EST-MCNC: 128 MG/DL
FASTING STATUS PATIENT QL REPORTED: NORMAL
GFR SERPLBLD CREATININE-BSD FMLA CKD-EPI: 86 ML/MIN/1.73 M 2
GLOBULIN SER CALC-MCNC: 2.7 G/DL (ref 1.9–3.5)
GLUCOSE SERPL-MCNC: 116 MG/DL (ref 65–99)
HBA1C MFR BLD: 6.1 % (ref 4–5.6)
HCT VFR BLD AUTO: 46.4 % (ref 37–47)
HDLC SERPL-MCNC: 80 MG/DL
HGB BLD-MCNC: 15.4 G/DL (ref 12–16)
IMM GRANULOCYTES # BLD AUTO: 0.04 K/UL (ref 0–0.11)
IMM GRANULOCYTES NFR BLD AUTO: 0.5 % (ref 0–0.9)
LDLC SERPL CALC-MCNC: 110 MG/DL
LYMPHOCYTES # BLD AUTO: 2.13 K/UL (ref 1–4.8)
LYMPHOCYTES NFR BLD: 27.1 % (ref 22–41)
MCH RBC QN AUTO: 30.4 PG (ref 27–33)
MCHC RBC AUTO-ENTMCNC: 33.2 G/DL (ref 32.2–35.5)
MCV RBC AUTO: 91.5 FL (ref 81.4–97.8)
MONOCYTES # BLD AUTO: 0.83 K/UL (ref 0–0.85)
MONOCYTES NFR BLD AUTO: 10.6 % (ref 0–13.4)
NEUTROPHILS # BLD AUTO: 4.74 K/UL (ref 1.82–7.42)
NEUTROPHILS NFR BLD: 60.3 % (ref 44–72)
NRBC # BLD AUTO: 0 K/UL
NRBC BLD-RTO: 0 /100 WBC (ref 0–0.2)
PLATELET # BLD AUTO: 323 K/UL (ref 164–446)
PMV BLD AUTO: 8.9 FL (ref 9–12.9)
POTASSIUM SERPL-SCNC: 3.9 MMOL/L (ref 3.6–5.5)
PROT SERPL-MCNC: 7.5 G/DL (ref 6–8.2)
RBC # BLD AUTO: 5.07 M/UL (ref 4.2–5.4)
SODIUM SERPL-SCNC: 139 MMOL/L (ref 135–145)
TRIGL SERPL-MCNC: 79 MG/DL (ref 0–149)
TSH SERPL DL<=0.005 MIU/L-ACNC: 2 UIU/ML (ref 0.38–5.33)
VIT B12 SERPL-MCNC: 507 PG/ML (ref 211–911)
WBC # BLD AUTO: 7.9 K/UL (ref 4.8–10.8)

## 2025-08-01 PROCEDURE — 80061 LIPID PANEL: CPT

## 2025-08-01 PROCEDURE — 84443 ASSAY THYROID STIM HORMONE: CPT

## 2025-08-01 PROCEDURE — 85025 COMPLETE CBC W/AUTO DIFF WBC: CPT

## 2025-08-01 PROCEDURE — 36415 COLL VENOUS BLD VENIPUNCTURE: CPT

## 2025-08-01 PROCEDURE — 82306 VITAMIN D 25 HYDROXY: CPT

## 2025-08-01 PROCEDURE — 82607 VITAMIN B-12: CPT

## 2025-08-01 PROCEDURE — 80053 COMPREHEN METABOLIC PANEL: CPT

## 2025-08-01 PROCEDURE — 83036 HEMOGLOBIN GLYCOSYLATED A1C: CPT | Mod: GA

## 2025-08-05 ENCOUNTER — HOSPITAL ENCOUNTER (OUTPATIENT)
Facility: MEDICAL CENTER | Age: OVER 89
End: 2025-08-05
Payer: MEDICARE

## 2025-08-05 DIAGNOSIS — R39.9 UTI SYMPTOMS: ICD-10-CM

## 2025-08-05 LAB
APPEARANCE UR: CLEAR
BACTERIA #/AREA URNS HPF: ABNORMAL /HPF
BILIRUB UR QL STRIP.AUTO: NEGATIVE
CASTS URNS QL MICRO: ABNORMAL /LPF (ref 0–2)
COLOR UR: YELLOW
EPITHELIAL CELLS 1715: ABNORMAL /HPF (ref 0–5)
GLUCOSE UR STRIP.AUTO-MCNC: NEGATIVE MG/DL
KETONES UR STRIP.AUTO-MCNC: NEGATIVE MG/DL
LEUKOCYTE ESTERASE UR QL STRIP.AUTO: ABNORMAL
MICRO URNS: ABNORMAL
NITRITE UR QL STRIP.AUTO: NEGATIVE
PH UR STRIP.AUTO: 7.5 [PH] (ref 5–8)
PROT UR QL STRIP: NEGATIVE MG/DL
RBC # URNS HPF: ABNORMAL /HPF (ref 0–2)
RBC UR QL AUTO: NEGATIVE
SP GR UR STRIP.AUTO: 1.01
UROBILINOGEN UR STRIP.AUTO-MCNC: 1 EU/DL
WBC #/AREA URNS HPF: ABNORMAL /HPF

## 2025-08-05 PROCEDURE — 81001 URINALYSIS AUTO W/SCOPE: CPT

## 2025-08-26 PROBLEM — G89.29 CHRONIC BACK PAIN: Status: ACTIVE | Noted: 2025-08-26

## 2025-08-26 PROBLEM — M54.9 CHRONIC BACK PAIN: Status: ACTIVE | Noted: 2025-08-26

## 2025-08-26 PROBLEM — N89.8 VAGINAL ITCHING: Status: ACTIVE | Noted: 2025-08-26
